# Patient Record
Sex: MALE | Race: OTHER | HISPANIC OR LATINO
[De-identification: names, ages, dates, MRNs, and addresses within clinical notes are randomized per-mention and may not be internally consistent; named-entity substitution may affect disease eponyms.]

---

## 2018-03-27 ENCOUNTER — TRANSCRIPTION ENCOUNTER (OUTPATIENT)
Age: 43
End: 2018-03-27

## 2018-03-28 ENCOUNTER — INPATIENT (INPATIENT)
Facility: HOSPITAL | Age: 43
LOS: 5 days | Discharge: HOME | DRG: 493 | End: 2018-04-03
Attending: ORTHOPAEDIC SURGERY | Admitting: ORTHOPAEDIC SURGERY
Payer: MEDICAID

## 2018-03-28 VITALS
RESPIRATION RATE: 16 BRPM | HEART RATE: 118 BPM | OXYGEN SATURATION: 96 % | DIASTOLIC BLOOD PRESSURE: 79 MMHG | TEMPERATURE: 98 F | SYSTOLIC BLOOD PRESSURE: 113 MMHG

## 2018-03-28 DIAGNOSIS — S82.201B UNSPECIFIED FRACTURE OF SHAFT OF RIGHT TIBIA, INITIAL ENCOUNTER FOR OPEN FRACTURE TYPE I OR II: ICD-10-CM

## 2018-03-28 LAB
ALBUMIN SERPL ELPH-MCNC: 3.8 G/DL — SIGNIFICANT CHANGE UP (ref 3.3–5)
ALP SERPL-CCNC: 83 U/L — SIGNIFICANT CHANGE UP (ref 40–120)
ALT FLD-CCNC: 35 U/L RC — SIGNIFICANT CHANGE UP (ref 10–45)
ANION GAP SERPL CALC-SCNC: 11 MMOL/L — SIGNIFICANT CHANGE UP (ref 5–17)
ANION GAP SERPL CALC-SCNC: 14 MMOL/L — SIGNIFICANT CHANGE UP (ref 5–17)
APPEARANCE UR: CLEAR — SIGNIFICANT CHANGE UP
APTT BLD: 28.1 SEC — SIGNIFICANT CHANGE UP (ref 27.5–37.4)
AST SERPL-CCNC: 29 U/L — SIGNIFICANT CHANGE UP (ref 10–40)
BASOPHILS # BLD AUTO: 0 K/UL — SIGNIFICANT CHANGE UP (ref 0–0.2)
BASOPHILS # BLD AUTO: 0.1 K/UL — SIGNIFICANT CHANGE UP (ref 0–0.2)
BASOPHILS NFR BLD AUTO: 0.1 % — SIGNIFICANT CHANGE UP (ref 0–2)
BASOPHILS NFR BLD AUTO: 0.6 % — SIGNIFICANT CHANGE UP (ref 0–2)
BILIRUB SERPL-MCNC: 0.4 MG/DL — SIGNIFICANT CHANGE UP (ref 0.2–1.2)
BILIRUB UR-MCNC: NEGATIVE — SIGNIFICANT CHANGE UP
BLD GP AB SCN SERPL QL: NEGATIVE — SIGNIFICANT CHANGE UP
BUN SERPL-MCNC: 17 MG/DL — SIGNIFICANT CHANGE UP (ref 7–23)
BUN SERPL-MCNC: 20 MG/DL — SIGNIFICANT CHANGE UP (ref 7–23)
CALCIUM SERPL-MCNC: 7.9 MG/DL — LOW (ref 8.4–10.5)
CALCIUM SERPL-MCNC: 9 MG/DL — SIGNIFICANT CHANGE UP (ref 8.4–10.5)
CHLORIDE SERPL-SCNC: 101 MMOL/L — SIGNIFICANT CHANGE UP (ref 96–108)
CHLORIDE SERPL-SCNC: 102 MMOL/L — SIGNIFICANT CHANGE UP (ref 96–108)
CK MB BLD-MCNC: 1.9 % — SIGNIFICANT CHANGE UP (ref 0–3.5)
CK MB CFR SERPL CALC: 17.2 NG/ML — HIGH (ref 0–6.7)
CK SERPL-CCNC: 907 U/L — HIGH (ref 30–200)
CO2 SERPL-SCNC: 26 MMOL/L — SIGNIFICANT CHANGE UP (ref 22–31)
CO2 SERPL-SCNC: 27 MMOL/L — SIGNIFICANT CHANGE UP (ref 22–31)
COLOR SPEC: YELLOW — SIGNIFICANT CHANGE UP
CREAT SERPL-MCNC: 0.64 MG/DL — SIGNIFICANT CHANGE UP (ref 0.5–1.3)
CREAT SERPL-MCNC: 0.69 MG/DL — SIGNIFICANT CHANGE UP (ref 0.5–1.3)
DIFF PNL FLD: ABNORMAL
EOSINOPHIL # BLD AUTO: 0 K/UL — SIGNIFICANT CHANGE UP (ref 0–0.5)
EOSINOPHIL # BLD AUTO: 0.2 K/UL — SIGNIFICANT CHANGE UP (ref 0–0.5)
EOSINOPHIL NFR BLD AUTO: 0.2 % — SIGNIFICANT CHANGE UP (ref 0–6)
EOSINOPHIL NFR BLD AUTO: 1.9 % — SIGNIFICANT CHANGE UP (ref 0–6)
GAS PNL BLDV: SIGNIFICANT CHANGE UP
GLUCOSE BLDC GLUCOMTR-MCNC: 180 MG/DL — HIGH (ref 70–99)
GLUCOSE BLDC GLUCOMTR-MCNC: 239 MG/DL — HIGH (ref 70–99)
GLUCOSE SERPL-MCNC: 199 MG/DL — HIGH (ref 70–99)
GLUCOSE SERPL-MCNC: 71 MG/DL — SIGNIFICANT CHANGE UP (ref 70–99)
GLUCOSE UR QL: NEGATIVE — SIGNIFICANT CHANGE UP
HCT VFR BLD CALC: 35.8 % — LOW (ref 39–50)
HCT VFR BLD CALC: 41.4 % — SIGNIFICANT CHANGE UP (ref 39–50)
HGB BLD-MCNC: 11.9 G/DL — LOW (ref 13–17)
HGB BLD-MCNC: 13.7 G/DL — SIGNIFICANT CHANGE UP (ref 13–17)
INR BLD: 1.03 RATIO — SIGNIFICANT CHANGE UP (ref 0.88–1.16)
KETONES UR-MCNC: NEGATIVE — SIGNIFICANT CHANGE UP
LEUKOCYTE ESTERASE UR-ACNC: NEGATIVE — SIGNIFICANT CHANGE UP
LYMPHOCYTES # BLD AUTO: 1 K/UL — SIGNIFICANT CHANGE UP (ref 1–3.3)
LYMPHOCYTES # BLD AUTO: 3.5 K/UL — HIGH (ref 1–3.3)
LYMPHOCYTES # BLD AUTO: 34.3 % — SIGNIFICANT CHANGE UP (ref 13–44)
LYMPHOCYTES # BLD AUTO: 7.5 % — LOW (ref 13–44)
MAGNESIUM SERPL-MCNC: 1.9 MG/DL — SIGNIFICANT CHANGE UP (ref 1.6–2.6)
MCHC RBC-ENTMCNC: 30.7 PG — SIGNIFICANT CHANGE UP (ref 27–34)
MCHC RBC-ENTMCNC: 31 PG — SIGNIFICANT CHANGE UP (ref 27–34)
MCHC RBC-ENTMCNC: 33.1 GM/DL — SIGNIFICANT CHANGE UP (ref 32–36)
MCHC RBC-ENTMCNC: 33.1 GM/DL — SIGNIFICANT CHANGE UP (ref 32–36)
MCV RBC AUTO: 92.7 FL — SIGNIFICANT CHANGE UP (ref 80–100)
MCV RBC AUTO: 93.5 FL — SIGNIFICANT CHANGE UP (ref 80–100)
MONOCYTES # BLD AUTO: 0.4 K/UL — SIGNIFICANT CHANGE UP (ref 0–0.9)
MONOCYTES # BLD AUTO: 0.8 K/UL — SIGNIFICANT CHANGE UP (ref 0–0.9)
MONOCYTES NFR BLD AUTO: 3 % — SIGNIFICANT CHANGE UP (ref 2–14)
MONOCYTES NFR BLD AUTO: 7.8 % — SIGNIFICANT CHANGE UP (ref 2–14)
NEUTROPHILS # BLD AUTO: 12.3 K/UL — HIGH (ref 1.8–7.4)
NEUTROPHILS # BLD AUTO: 5.6 K/UL — SIGNIFICANT CHANGE UP (ref 1.8–7.4)
NEUTROPHILS NFR BLD AUTO: 55.4 % — SIGNIFICANT CHANGE UP (ref 43–77)
NEUTROPHILS NFR BLD AUTO: 89.3 % — HIGH (ref 43–77)
NITRITE UR-MCNC: NEGATIVE — SIGNIFICANT CHANGE UP
PH UR: 7 — SIGNIFICANT CHANGE UP (ref 5–8)
PHOSPHATE SERPL-MCNC: 1.8 MG/DL — LOW (ref 2.5–4.5)
PLATELET # BLD AUTO: 262 K/UL — SIGNIFICANT CHANGE UP (ref 150–400)
PLATELET # BLD AUTO: 315 K/UL — SIGNIFICANT CHANGE UP (ref 150–400)
POTASSIUM SERPL-MCNC: 3.7 MMOL/L — SIGNIFICANT CHANGE UP (ref 3.5–5.3)
POTASSIUM SERPL-MCNC: 4.2 MMOL/L — SIGNIFICANT CHANGE UP (ref 3.5–5.3)
POTASSIUM SERPL-SCNC: 3.7 MMOL/L — SIGNIFICANT CHANGE UP (ref 3.5–5.3)
POTASSIUM SERPL-SCNC: 4.2 MMOL/L — SIGNIFICANT CHANGE UP (ref 3.5–5.3)
PROT SERPL-MCNC: 7.4 G/DL — SIGNIFICANT CHANGE UP (ref 6–8.3)
PROT UR-MCNC: 30 MG/DL
PROTHROM AB SERPL-ACNC: 11.2 SEC — SIGNIFICANT CHANGE UP (ref 9.8–12.7)
RBC # BLD: 3.83 M/UL — LOW (ref 4.2–5.8)
RBC # BLD: 4.47 M/UL — SIGNIFICANT CHANGE UP (ref 4.2–5.8)
RBC # FLD: 12.2 % — SIGNIFICANT CHANGE UP (ref 10.3–14.5)
RBC # FLD: 12.3 % — SIGNIFICANT CHANGE UP (ref 10.3–14.5)
RH IG SCN BLD-IMP: POSITIVE — SIGNIFICANT CHANGE UP
SODIUM SERPL-SCNC: 139 MMOL/L — SIGNIFICANT CHANGE UP (ref 135–145)
SODIUM SERPL-SCNC: 142 MMOL/L — SIGNIFICANT CHANGE UP (ref 135–145)
SP GR SPEC: 1.02 — SIGNIFICANT CHANGE UP (ref 1.01–1.02)
TROPONIN T SERPL-MCNC: 0.03 NG/ML — SIGNIFICANT CHANGE UP (ref 0–0.06)
UROBILINOGEN FLD QL: 1 MG/DL
WBC # BLD: 10.2 K/UL — SIGNIFICANT CHANGE UP (ref 3.8–10.5)
WBC # BLD: 13.7 K/UL — HIGH (ref 3.8–10.5)
WBC # FLD AUTO: 10.2 K/UL — SIGNIFICANT CHANGE UP (ref 3.8–10.5)
WBC # FLD AUTO: 13.7 K/UL — HIGH (ref 3.8–10.5)

## 2018-03-28 PROCEDURE — 99285 EMERGENCY DEPT VISIT HI MDM: CPT | Mod: 25

## 2018-03-28 PROCEDURE — 73590 X-RAY EXAM OF LOWER LEG: CPT | Mod: 26,77,59,RT

## 2018-03-28 PROCEDURE — 71045 X-RAY EXAM CHEST 1 VIEW: CPT | Mod: 26

## 2018-03-28 PROCEDURE — 73610 X-RAY EXAM OF ANKLE: CPT | Mod: 26,RT

## 2018-03-28 PROCEDURE — 73590 X-RAY EXAM OF LOWER LEG: CPT | Mod: 26,RT

## 2018-03-28 PROCEDURE — 76377 3D RENDER W/INTRP POSTPROCES: CPT | Mod: 26

## 2018-03-28 PROCEDURE — 93010 ELECTROCARDIOGRAM REPORT: CPT

## 2018-03-28 PROCEDURE — 73700 CT LOWER EXTREMITY W/O DYE: CPT | Mod: 26,RT

## 2018-03-28 PROCEDURE — 73560 X-RAY EXAM OF KNEE 1 OR 2: CPT | Mod: 26,RT

## 2018-03-28 PROCEDURE — 73590 X-RAY EXAM OF LOWER LEG: CPT | Mod: 26,77,RT

## 2018-03-28 RX ORDER — MORPHINE SULFATE 50 MG/1
4 CAPSULE, EXTENDED RELEASE ORAL ONCE
Qty: 0 | Refills: 0 | Status: DISCONTINUED | OUTPATIENT
Start: 2018-03-28 | End: 2018-03-28

## 2018-03-28 RX ORDER — DEXTROSE 50 % IN WATER 50 %
25 SYRINGE (ML) INTRAVENOUS ONCE
Qty: 0 | Refills: 0 | Status: DISCONTINUED | OUTPATIENT
Start: 2018-03-28 | End: 2018-03-28

## 2018-03-28 RX ORDER — HYDROMORPHONE HYDROCHLORIDE 2 MG/ML
0.5 INJECTION INTRAMUSCULAR; INTRAVENOUS; SUBCUTANEOUS
Qty: 0 | Refills: 0 | Status: DISCONTINUED | OUTPATIENT
Start: 2018-03-28 | End: 2018-03-28

## 2018-03-28 RX ORDER — SODIUM CHLORIDE 9 MG/ML
1000 INJECTION, SOLUTION INTRAVENOUS
Qty: 0 | Refills: 0 | Status: DISCONTINUED | OUTPATIENT
Start: 2018-03-28 | End: 2018-04-03

## 2018-03-28 RX ORDER — SODIUM CHLORIDE 9 MG/ML
1000 INJECTION, SOLUTION INTRAVENOUS
Qty: 0 | Refills: 0 | Status: DISCONTINUED | OUTPATIENT
Start: 2018-03-28 | End: 2018-03-28

## 2018-03-28 RX ORDER — SIMVASTATIN 20 MG/1
20 TABLET, FILM COATED ORAL AT BEDTIME
Qty: 0 | Refills: 0 | Status: DISCONTINUED | OUTPATIENT
Start: 2018-03-28 | End: 2018-04-03

## 2018-03-28 RX ORDER — TETANUS TOXOID, REDUCED DIPHTHERIA TOXOID AND ACELLULAR PERTUSSIS VACCINE, ADSORBED 5; 2.5; 8; 8; 2.5 [IU]/.5ML; [IU]/.5ML; UG/.5ML; UG/.5ML; UG/.5ML
0.5 SUSPENSION INTRAMUSCULAR ONCE
Qty: 0 | Refills: 0 | Status: COMPLETED | OUTPATIENT
Start: 2018-03-28 | End: 2018-03-28

## 2018-03-28 RX ORDER — INSULIN LISPRO 100/ML
VIAL (ML) SUBCUTANEOUS AT BEDTIME
Qty: 0 | Refills: 0 | Status: DISCONTINUED | OUTPATIENT
Start: 2018-03-28 | End: 2018-03-28

## 2018-03-28 RX ORDER — CEFAZOLIN SODIUM 1 G
1000 VIAL (EA) INJECTION ONCE
Qty: 0 | Refills: 0 | Status: DISCONTINUED | OUTPATIENT
Start: 2018-03-28 | End: 2018-03-28

## 2018-03-28 RX ORDER — ATENOLOL 25 MG/1
50 TABLET ORAL AT BEDTIME
Qty: 0 | Refills: 0 | Status: DISCONTINUED | OUTPATIENT
Start: 2018-03-28 | End: 2018-04-03

## 2018-03-28 RX ORDER — INSULIN LISPRO 100/ML
VIAL (ML) SUBCUTANEOUS AT BEDTIME
Qty: 0 | Refills: 0 | Status: DISCONTINUED | OUTPATIENT
Start: 2018-03-28 | End: 2018-03-30

## 2018-03-28 RX ORDER — SODIUM CHLORIDE 9 MG/ML
1000 INJECTION INTRAMUSCULAR; INTRAVENOUS; SUBCUTANEOUS
Qty: 0 | Refills: 0 | Status: DISCONTINUED | OUTPATIENT
Start: 2018-03-28 | End: 2018-03-28

## 2018-03-28 RX ORDER — OXYCODONE HYDROCHLORIDE 5 MG/1
10 TABLET ORAL EVERY 4 HOURS
Qty: 0 | Refills: 0 | Status: DISCONTINUED | OUTPATIENT
Start: 2018-03-28 | End: 2018-04-03

## 2018-03-28 RX ORDER — INSULIN LISPRO 100/ML
VIAL (ML) SUBCUTANEOUS
Qty: 0 | Refills: 0 | Status: DISCONTINUED | OUTPATIENT
Start: 2018-03-28 | End: 2018-03-30

## 2018-03-28 RX ORDER — MORPHINE SULFATE 50 MG/1
4 CAPSULE, EXTENDED RELEASE ORAL EVERY 4 HOURS
Qty: 0 | Refills: 0 | Status: DISCONTINUED | OUTPATIENT
Start: 2018-03-28 | End: 2018-04-03

## 2018-03-28 RX ORDER — GLUCAGON INJECTION, SOLUTION 0.5 MG/.1ML
1 INJECTION, SOLUTION SUBCUTANEOUS ONCE
Qty: 0 | Refills: 0 | Status: DISCONTINUED | OUTPATIENT
Start: 2018-03-28 | End: 2018-04-03

## 2018-03-28 RX ORDER — INSULIN LISPRO 100/ML
VIAL (ML) SUBCUTANEOUS
Qty: 0 | Refills: 0 | Status: DISCONTINUED | OUTPATIENT
Start: 2018-03-28 | End: 2018-03-28

## 2018-03-28 RX ORDER — DEXTROSE 50 % IN WATER 50 %
50 SYRINGE (ML) INTRAVENOUS ONCE
Qty: 0 | Refills: 0 | Status: COMPLETED | OUTPATIENT
Start: 2018-03-28 | End: 2018-03-28

## 2018-03-28 RX ORDER — LEVOTHYROXINE SODIUM 125 MCG
150 TABLET ORAL DAILY
Qty: 0 | Refills: 0 | Status: DISCONTINUED | OUTPATIENT
Start: 2018-03-28 | End: 2018-04-03

## 2018-03-28 RX ORDER — ONDANSETRON 8 MG/1
4 TABLET, FILM COATED ORAL EVERY 6 HOURS
Qty: 0 | Refills: 0 | Status: DISCONTINUED | OUTPATIENT
Start: 2018-03-28 | End: 2018-04-03

## 2018-03-28 RX ORDER — SODIUM,POTASSIUM PHOSPHATES 278-250MG
1 POWDER IN PACKET (EA) ORAL ONCE
Qty: 0 | Refills: 0 | Status: DISCONTINUED | OUTPATIENT
Start: 2018-03-28 | End: 2018-03-28

## 2018-03-28 RX ORDER — SODIUM CHLORIDE 9 MG/ML
1000 INJECTION, SOLUTION INTRAVENOUS
Qty: 0 | Refills: 0 | Status: DISCONTINUED | OUTPATIENT
Start: 2018-03-28 | End: 2018-03-29

## 2018-03-28 RX ORDER — DEXTROSE 50 % IN WATER 50 %
1 SYRINGE (ML) INTRAVENOUS ONCE
Qty: 0 | Refills: 0 | Status: DISCONTINUED | OUTPATIENT
Start: 2018-03-28 | End: 2018-03-28

## 2018-03-28 RX ORDER — OXYCODONE AND ACETAMINOPHEN 5; 325 MG/1; MG/1
1 TABLET ORAL EVERY 4 HOURS
Qty: 0 | Refills: 0 | Status: DISCONTINUED | OUTPATIENT
Start: 2018-03-28 | End: 2018-03-28

## 2018-03-28 RX ORDER — DOCUSATE SODIUM 100 MG
100 CAPSULE ORAL THREE TIMES A DAY
Qty: 0 | Refills: 0 | Status: DISCONTINUED | OUTPATIENT
Start: 2018-03-28 | End: 2018-04-03

## 2018-03-28 RX ORDER — CEFAZOLIN SODIUM 1 G
2000 VIAL (EA) INJECTION EVERY 8 HOURS
Qty: 0 | Refills: 0 | Status: COMPLETED | OUTPATIENT
Start: 2018-03-28 | End: 2018-03-29

## 2018-03-28 RX ORDER — DEXTROSE 50 % IN WATER 50 %
12.5 SYRINGE (ML) INTRAVENOUS ONCE
Qty: 0 | Refills: 0 | Status: DISCONTINUED | OUTPATIENT
Start: 2018-03-28 | End: 2018-04-03

## 2018-03-28 RX ORDER — OXYCODONE HYDROCHLORIDE 5 MG/1
5 TABLET ORAL EVERY 4 HOURS
Qty: 0 | Refills: 0 | Status: DISCONTINUED | OUTPATIENT
Start: 2018-03-28 | End: 2018-04-03

## 2018-03-28 RX ORDER — CEFAZOLIN SODIUM 1 G
1000 VIAL (EA) INJECTION ONCE
Qty: 0 | Refills: 0 | Status: COMPLETED | OUTPATIENT
Start: 2018-03-28 | End: 2018-03-28

## 2018-03-28 RX ORDER — DEXTROSE 50 % IN WATER 50 %
25 SYRINGE (ML) INTRAVENOUS ONCE
Qty: 0 | Refills: 0 | Status: DISCONTINUED | OUTPATIENT
Start: 2018-03-28 | End: 2018-04-03

## 2018-03-28 RX ORDER — ONDANSETRON 8 MG/1
4 TABLET, FILM COATED ORAL ONCE
Qty: 0 | Refills: 0 | Status: DISCONTINUED | OUTPATIENT
Start: 2018-03-28 | End: 2018-04-03

## 2018-03-28 RX ORDER — OXYCODONE AND ACETAMINOPHEN 5; 325 MG/1; MG/1
2 TABLET ORAL EVERY 6 HOURS
Qty: 0 | Refills: 0 | Status: DISCONTINUED | OUTPATIENT
Start: 2018-03-28 | End: 2018-03-28

## 2018-03-28 RX ORDER — DEXTROSE 50 % IN WATER 50 %
1 SYRINGE (ML) INTRAVENOUS ONCE
Qty: 0 | Refills: 0 | Status: DISCONTINUED | OUTPATIENT
Start: 2018-03-28 | End: 2018-04-03

## 2018-03-28 RX ORDER — FERROUS SULFATE 325(65) MG
325 TABLET ORAL
Qty: 0 | Refills: 0 | Status: DISCONTINUED | OUTPATIENT
Start: 2018-03-28 | End: 2018-04-03

## 2018-03-28 RX ORDER — GLUCAGON INJECTION, SOLUTION 0.5 MG/.1ML
1 INJECTION, SOLUTION SUBCUTANEOUS ONCE
Qty: 0 | Refills: 0 | Status: DISCONTINUED | OUTPATIENT
Start: 2018-03-28 | End: 2018-03-28

## 2018-03-28 RX ORDER — ASPIRIN/CALCIUM CARB/MAGNESIUM 324 MG
325 TABLET ORAL
Qty: 0 | Refills: 0 | Status: DISCONTINUED | OUTPATIENT
Start: 2018-03-29 | End: 2018-04-03

## 2018-03-28 RX ORDER — ACETAMINOPHEN 500 MG
650 TABLET ORAL EVERY 6 HOURS
Qty: 0 | Refills: 0 | Status: DISCONTINUED | OUTPATIENT
Start: 2018-03-28 | End: 2018-04-03

## 2018-03-28 RX ORDER — DEXTROSE 50 % IN WATER 50 %
12.5 SYRINGE (ML) INTRAVENOUS ONCE
Qty: 0 | Refills: 0 | Status: DISCONTINUED | OUTPATIENT
Start: 2018-03-28 | End: 2018-03-28

## 2018-03-28 RX ADMIN — TETANUS TOXOID, REDUCED DIPHTHERIA TOXOID AND ACELLULAR PERTUSSIS VACCINE, ADSORBED 0.5 MILLILITER(S): 5; 2.5; 8; 8; 2.5 SUSPENSION INTRAMUSCULAR at 11:41

## 2018-03-28 RX ADMIN — MORPHINE SULFATE 4 MILLIGRAM(S): 50 CAPSULE, EXTENDED RELEASE ORAL at 11:09

## 2018-03-28 RX ADMIN — SODIUM CHLORIDE 100 MILLILITER(S): 9 INJECTION, SOLUTION INTRAVENOUS at 11:40

## 2018-03-28 RX ADMIN — SIMVASTATIN 20 MILLIGRAM(S): 20 TABLET, FILM COATED ORAL at 22:55

## 2018-03-28 RX ADMIN — Medication 100 MILLIGRAM(S): at 22:55

## 2018-03-28 RX ADMIN — ATENOLOL 50 MILLIGRAM(S): 25 TABLET ORAL at 22:56

## 2018-03-28 RX ADMIN — Medication 50 MILLILITER(S): at 11:05

## 2018-03-28 RX ADMIN — Medication 100 MILLIGRAM(S): at 11:12

## 2018-03-28 NOTE — CONSULT NOTE ADULT - SUBJECTIVE AND OBJECTIVE BOX
Level __ Trauma Activation    CC: Patient is a 42y old  Male who presents with a chief complaint of       Patient is a 42y year old male with PMHx of   HPI: Fall from standing, atraumatic while walking towards store. No head strike, no LOC. Patient did not experience signs of light headedness prior to fall.  Patient not complaining of CP, SOB, abdominal discomfort. Only pain is in RLE, tissue is protruding through small poke hole in distal posterior leg.       Primary Survey  A - airway intact  B - bilateral breath sounds and good chest rise  C - initially BP: 125/82 palpable pulses in all extremities except PT pulse in RLE. Dopller confirmed PT  D - GCS 15 on arrival  Exposure obtained    Secondary survey  Gen: NAD  HEENT: NC/AT  CV: s1, s2, RRR  Pulm: CTA B/L  Chest: No TTP  Abd: Soft, ND, NT, no rebound, no guarding  Groin: Normal appearing  Ext: Palp radial b/l, palp DP b/l, PT confirmed via doppler RLE  Back: no TTP, no palpable runoff, stepoff, or deformity    PMH: Diabetes Mellitus    PSH: None    MEDS: Insulin    Allergies    No Known Allergies    Intolerances      Social: No alcohol use. No smoking history    A/P:                   Imaging Level 2 Trauma Activation    CC: Patient is a 42y old  Male who presents with a chief complaint of RLE lower leg pain secondary to mechanical fall      Patient is a 42y year old male with PMHx of   HPI: Fall from standing, atraumatic while walking towards store. No head strike, no LOC. Patient did not experience signs of light headedness prior to fall.  Patient not complaining of CP, SOB, abdominal discomfort. Only pain is in RLE, tissue is protruding through small poke hole in distal posterior leg. Last ate at 8 am.       Primary Survey  A - airway intact  B - bilateral breath sounds and good chest rise  C - initially BP: 125/82 palpable pulses in all extremities except PT pulse in RLE. Dopller confirmed PT  D - GCS 15 on arrival  Exposure obtained    Secondary survey  Gen: NAD  HEENT: NC/AT  CV: s1, s2, RRR  Pulm: CTA B/L  Chest: No TTP  Abd: Soft, ND, NT, no rebound, no guarding  Groin: Normal appearing  Ext: Palp radial b/l, palp DP b/l, PT confirmed via doppler RLE; open fracture with protruding tissue in posterior tibia (1 cm poke hole)  Back: no TTP, no palpable runoff, stepoff, or deformity    PMH: Diabetes Mellitus    PSH: None    MEDS: Insulin    Allergies    No Known Allergies    Intolerances      Social: No alcohol use. No smoking history    Imaging:  CXR: normal  RLE: Distal tibia and fibula fracture and proximal fibula fracture    A/P: 42M with no significant pmhx presents with open fracture of distal tibia and fibula fracture s/p mechanical fall    - ancef   - tetanus  - NPO/IVF  - pre-op labs for Orthopedics - ORIF today  - no acute general surgical interventions  - Tertiary exam in AM  - discussed with attending Level 2 Trauma Activation    CC: Patient is a 42y old  Male who presents with a chief complaint of RLE lower leg pain secondary to mechanical fall      Patient is a 42y year old male with PMHx of   HPI: Fall from standing, atraumatic while walking towards store. No head strike, no LOC. Patient did not experience signs of light headedness prior to fall.  Patient not complaining of CP, SOB, abdominal discomfort. Only pain is in RLE, tissue is protruding through small poke hole in distal posterior leg. Last ate at 8 am.       Primary Survey  A - airway intact  B - bilateral breath sounds and good chest rise  C - initially BP: 125/82 palpable pulses in all extremities except PT pulse in RLE. Dopller confirmed PT  D - GCS 15 on arrival  Exposure obtained    Secondary survey  Gen: NAD  HEENT: NC/AT  CV: s1, s2, RRR  Pulm: CTA B/L  Chest: No TTP  Abd: Soft, ND, NT, no rebound, no guarding  Groin: Normal appearing  Ext: Palp radial b/l, palp DP b/l, PT confirmed via doppler RLE; open fracture with protruding tissue in posterior tibia (1 cm poke hole)  Back: no TTP, no palpable runoff, stepoff, or deformity    PMH: Diabetes Mellitus    PSH: None    MEDS: Insulin    Allergies    No Known Allergies    Intolerances      Social: No alcohol use. No smoking history    Imaging:  CXR: normal  RLE: Distal tibia and fibula fracture and proximal fibula fracture    A/P: 42M with no significant pmhx presents with open fracture of distal tibia and fibula fracture s/p mechanical fall    - ancef   - tetanus  - NPO/IVF  - pre-op labs for Orthopedics - ORIF today  - no acute general surgical interventions  - Tertiary exam in AM  - No contraindication to surgery from trauma surgery perspective  - discussed with attending

## 2018-03-28 NOTE — BRIEF OPERATIVE NOTE - PROCEDURE
<<-----Click on this checkbox to enter Procedure Intramedullary nailing for fracture of right tibia  03/28/2018  I&D and IMN of Right Tibia Fracture  Active  [id number]

## 2018-03-28 NOTE — BRIEF OPERATIVE NOTE - POST-OP DX
Type I or II open displaced oblique fracture of shaft of right tibia, initial encounter  03/28/2018    Active  Tyler Bill

## 2018-03-28 NOTE — H&P ADULT - ASSESSMENT
A/P: 42y Male with R tibia and fibula fracture  - tetanus if not up to date, ancef abx for open fx protocol in ED  - Admit to ORtho  -pain control  -keep splint dry intact  -rest ice elevate affected leg  -NWB on affected leg  -Post reduction XR reveal adequate reduction  - Patient for OR today with Dr Mascorro, ORIF  - NPO, IF fluids while NPO  - FU Trauma clearance for OR  - Hold all chemical DVT ppx.

## 2018-03-28 NOTE — ED ADULT NURSE REASSESSMENT NOTE - NS ED NURSE REASSESS COMMENT FT1
Patient had his fracture reduced and splint was put on by MD. Patient tolerated procedure well. He was taken to CT scan.
pharmacy was called for patients potassium rx
OR transport came to take patient. ortho MD did not notify ED attending or resident that the patient was to go to the OR. OR was called and report was given. Valuables were given by patient to his family. Patient was made aware to removed all clothing underneath gown. Receiving RN herlinda aware that patient has not yet received potassium, and also made aware that patient was hypoglycemic earlier. Patient taken to OR.

## 2018-03-28 NOTE — PATIENT PROFILE ADULT. - VISION (WITH CORRECTIVE LENSES IF THE PATIENT USUALLY WEARS THEM):
reading/driving/Partially impaired: cannot see medication labels or newsprint, but can see obstacles in path, and the surrounding layout; can count fingers at arm's length

## 2018-03-28 NOTE — ED PROVIDER NOTE - ATTENDING CONTRIBUTION TO CARE
42 yom pmhx insulin dependent DM presents after becoming weak and lightheaded while walking - twisted his right leg/ ankle. ems found him to be hypoglycemic to 50s in field, gave amp of d50. states no change to his usual insulin regimen, did eat breakfast. pt did not hit head, no LOC. has mild pain to right ankle, though also has an obvious open fx to right distal tib/fib. no cp or sob. no recent illnessess. 42 yom pmhx insulin dependent DM presents after becoming weak and lightheaded while walking - twisted his right leg/ ankle. ems found him to be hypoglycemic to 50s in field, gave amp of d50. states no change to his usual insulin regimen, did eat breakfast. pt did not hit head, no LOC. has mild pain to right ankle, though also has an obvious open fx to right distal tib/fib. no cp or sob. no recent illnessess.    ROS:   constitutional - no fever, no chills  eyes - no visual changes, no redness  eent - no sore throat, no nasal congestion  cvs - no chest pain, no leg swelling  resp - no shortness of breath, no cough  gi - no abdominal pain, no vomiting, no diarrhea  gu - no dysuria, no hematuria  msk - no acute back pain, no joint swelling, + right ankle pain  skin - no rashes, no jaundice  neuro - no headache, no focal weakness  psych - no acute mental health issue     Physical Exam:   constitutional - well appearing, awake and alert, oriented x3, gcs 15  head - no external evidence of trauma  cvs - rrr, no murmurs, no peripheral edema  resp - breath sounds clear and equal bilat  gi - abdomen soft and nontender, no rigidity, guarding or rebound, bowel sounds present  msk - moving all extremities spontaneously w exception of open fx to tib/fib distal rle, distal dp pulses are intact.   neuro - alert and oriented x3, no focal deficits, CNs 2-12 grossly intact  skin- no jaundice, warm and dry  psych - mood and affect wnl, no apparent risk to self or others     likely twisted ankle due to hypoglycemia - open tib/fib fx - will admit to ortho for further eval likely OR. JADYN Velazquez MD

## 2018-03-28 NOTE — H&P ADULT - NSHPPHYSICALEXAM_GEN_ALL_CORE
Vital Signs Last 24 Hrs  T(C): 36.6 (28 Mar 2018 11:42), Max: 36.6 (28 Mar 2018 11:42)  T(F): 97.9 (28 Mar 2018 11:42), Max: 97.9 (28 Mar 2018 11:42)  HR: 118 (28 Mar 2018 11:42) (118 - 118)  BP: 113/79 (28 Mar 2018 11:42) (113/79 - 113/79)  BP(mean): --  RR: 16 (28 Mar 2018 11:42) (16 - 16)  SpO2: 96% (28 Mar 2018 11:42) (96% - 96%)    Physical Exam  Gen: NAD, AAOx3  RLE: 1cm laceration in skin along posterior distal tibia, no gross bone or debridement visualized, +swelling at fracture site, +TTP over fracture site, no bony TTP at Knee/Foot/Toes, able to SLR, neg logroll, +EHL/FHL/TA/GS, SILT L3-S1, +DP/PT Pulses, compartments soft.  Knee exam: non tender to palpation along joint line, no gross edema or ecchymosis, skin intact  ankle exam: ankle ROM limited 2/2 pain,  edema or erythema extending from distal tibial injury, skin intact, non tender to palpation of medial and lateral malleoulus    Secondary Survey: Full ROM of unaffected extremities, SILT globally, compartments soft, no bony TTP over bony prominences, no calf TTP, able to SLR with contralateral leg, no TTP along axial spine    Procedure:  Reduction performed. Pt placed in long leg splint at 30degrees of knee flexion. NVI post splint.

## 2018-03-28 NOTE — H&P ADULT - HISTORY OF PRESENT ILLNESS
42y Male community ambulator presents c/o R lower extremity pain s/p fall. Pt is a community ambulatory at baseline. Pt is unable to bear weight on extremity. Pt denies numbness tingling paresthesias in affected limb. Pt denies headstrike or LOC and denies any other orthopedic injuries at this time.

## 2018-03-29 DIAGNOSIS — E03.9 HYPOTHYROIDISM, UNSPECIFIED: ICD-10-CM

## 2018-03-29 DIAGNOSIS — S82.201B UNSPECIFIED FRACTURE OF SHAFT OF RIGHT TIBIA, INITIAL ENCOUNTER FOR OPEN FRACTURE TYPE I OR II: ICD-10-CM

## 2018-03-29 DIAGNOSIS — E10.65 TYPE 1 DIABETES MELLITUS WITH HYPERGLYCEMIA: ICD-10-CM

## 2018-03-29 LAB
ANION GAP SERPL CALC-SCNC: 12 MMOL/L — SIGNIFICANT CHANGE UP (ref 5–17)
ANION GAP SERPL CALC-SCNC: 13 MMOL/L — SIGNIFICANT CHANGE UP (ref 5–17)
B-OH-BUTYR SERPL-SCNC: 0 MMOL/L — SIGNIFICANT CHANGE UP
BUN SERPL-MCNC: 18 MG/DL — SIGNIFICANT CHANGE UP (ref 7–23)
BUN SERPL-MCNC: 23 MG/DL — SIGNIFICANT CHANGE UP (ref 7–23)
CALCIUM SERPL-MCNC: 8.3 MG/DL — LOW (ref 8.4–10.5)
CALCIUM SERPL-MCNC: 8.5 MG/DL — SIGNIFICANT CHANGE UP (ref 8.4–10.5)
CHLORIDE SERPL-SCNC: 97 MMOL/L — SIGNIFICANT CHANGE UP (ref 96–108)
CHLORIDE SERPL-SCNC: 98 MMOL/L — SIGNIFICANT CHANGE UP (ref 96–108)
CO2 SERPL-SCNC: 26 MMOL/L — SIGNIFICANT CHANGE UP (ref 22–31)
CO2 SERPL-SCNC: 27 MMOL/L — SIGNIFICANT CHANGE UP (ref 22–31)
CREAT SERPL-MCNC: 0.81 MG/DL — SIGNIFICANT CHANGE UP (ref 0.5–1.3)
CREAT SERPL-MCNC: 1.05 MG/DL — SIGNIFICANT CHANGE UP (ref 0.5–1.3)
GLUCOSE BLDC GLUCOMTR-MCNC: 154 MG/DL — HIGH (ref 70–99)
GLUCOSE BLDC GLUCOMTR-MCNC: 318 MG/DL — HIGH (ref 70–99)
GLUCOSE BLDC GLUCOMTR-MCNC: 368 MG/DL — HIGH (ref 70–99)
GLUCOSE BLDC GLUCOMTR-MCNC: 388 MG/DL — HIGH (ref 70–99)
GLUCOSE BLDC GLUCOMTR-MCNC: 440 MG/DL — HIGH (ref 70–99)
GLUCOSE BLDC GLUCOMTR-MCNC: 459 MG/DL — CRITICAL HIGH (ref 70–99)
GLUCOSE BLDC GLUCOMTR-MCNC: 484 MG/DL — CRITICAL HIGH (ref 70–99)
GLUCOSE BLDC GLUCOMTR-MCNC: 515 MG/DL — CRITICAL HIGH (ref 70–99)
GLUCOSE BLDC GLUCOMTR-MCNC: >600 MG/DL — CRITICAL HIGH (ref 70–99)
GLUCOSE SERPL-MCNC: 371 MG/DL — HIGH (ref 70–99)
GLUCOSE SERPL-MCNC: 383 MG/DL — HIGH (ref 70–99)
HBA1C BLD-MCNC: 8 % — HIGH (ref 4–5.6)
HCT VFR BLD CALC: 32.7 % — LOW (ref 39–50)
HGB BLD-MCNC: 10.8 G/DL — LOW (ref 13–17)
MCHC RBC-ENTMCNC: 30.1 PG — SIGNIFICANT CHANGE UP (ref 27–34)
MCHC RBC-ENTMCNC: 33 GM/DL — SIGNIFICANT CHANGE UP (ref 32–36)
MCV RBC AUTO: 91.1 FL — SIGNIFICANT CHANGE UP (ref 80–100)
NRBC # BLD: 0 /100 WBCS — SIGNIFICANT CHANGE UP (ref 0–0)
PLATELET # BLD AUTO: 255 K/UL — SIGNIFICANT CHANGE UP (ref 150–400)
POTASSIUM SERPL-MCNC: 4.5 MMOL/L — SIGNIFICANT CHANGE UP (ref 3.5–5.3)
POTASSIUM SERPL-MCNC: 4.7 MMOL/L — SIGNIFICANT CHANGE UP (ref 3.5–5.3)
POTASSIUM SERPL-SCNC: 4.5 MMOL/L — SIGNIFICANT CHANGE UP (ref 3.5–5.3)
POTASSIUM SERPL-SCNC: 4.7 MMOL/L — SIGNIFICANT CHANGE UP (ref 3.5–5.3)
RBC # BLD: 3.59 M/UL — LOW (ref 4.2–5.8)
RBC # FLD: 13.9 % — SIGNIFICANT CHANGE UP (ref 10.3–14.5)
SODIUM SERPL-SCNC: 136 MMOL/L — SIGNIFICANT CHANGE UP (ref 135–145)
SODIUM SERPL-SCNC: 137 MMOL/L — SIGNIFICANT CHANGE UP (ref 135–145)
WBC # BLD: 9.02 K/UL — SIGNIFICANT CHANGE UP (ref 3.8–10.5)
WBC # FLD AUTO: 9.02 K/UL — SIGNIFICANT CHANGE UP (ref 3.8–10.5)

## 2018-03-29 PROCEDURE — 99223 1ST HOSP IP/OBS HIGH 75: CPT | Mod: GC

## 2018-03-29 RX ORDER — INSULIN LISPRO 100/ML
4 VIAL (ML) SUBCUTANEOUS ONCE
Qty: 0 | Refills: 0 | Status: COMPLETED | OUTPATIENT
Start: 2018-03-29 | End: 2018-03-29

## 2018-03-29 RX ORDER — INSULIN LISPRO 100/ML
6 VIAL (ML) SUBCUTANEOUS
Qty: 0 | Refills: 0 | Status: DISCONTINUED | OUTPATIENT
Start: 2018-03-29 | End: 2018-03-30

## 2018-03-29 RX ORDER — INSULIN GLARGINE 100 [IU]/ML
15 INJECTION, SOLUTION SUBCUTANEOUS ONCE
Qty: 0 | Refills: 0 | Status: COMPLETED | OUTPATIENT
Start: 2018-03-29 | End: 2018-03-29

## 2018-03-29 RX ORDER — INSULIN GLARGINE 100 [IU]/ML
15 INJECTION, SOLUTION SUBCUTANEOUS
Qty: 0 | Refills: 0 | Status: DISCONTINUED | OUTPATIENT
Start: 2018-03-30 | End: 2018-03-30

## 2018-03-29 RX ADMIN — MORPHINE SULFATE 4 MILLIGRAM(S): 50 CAPSULE, EXTENDED RELEASE ORAL at 13:04

## 2018-03-29 RX ADMIN — ATENOLOL 50 MILLIGRAM(S): 25 TABLET ORAL at 21:41

## 2018-03-29 RX ADMIN — OXYCODONE HYDROCHLORIDE 10 MILLIGRAM(S): 5 TABLET ORAL at 06:15

## 2018-03-29 RX ADMIN — Medication 100 MILLIGRAM(S): at 06:15

## 2018-03-29 RX ADMIN — Medication 325 MILLIGRAM(S): at 12:05

## 2018-03-29 RX ADMIN — Medication 5: at 17:52

## 2018-03-29 RX ADMIN — OXYCODONE HYDROCHLORIDE 10 MILLIGRAM(S): 5 TABLET ORAL at 22:21

## 2018-03-29 RX ADMIN — MORPHINE SULFATE 4 MILLIGRAM(S): 50 CAPSULE, EXTENDED RELEASE ORAL at 13:37

## 2018-03-29 RX ADMIN — MORPHINE SULFATE 4 MILLIGRAM(S): 50 CAPSULE, EXTENDED RELEASE ORAL at 20:34

## 2018-03-29 RX ADMIN — SIMVASTATIN 20 MILLIGRAM(S): 20 TABLET, FILM COATED ORAL at 21:41

## 2018-03-29 RX ADMIN — MORPHINE SULFATE 4 MILLIGRAM(S): 50 CAPSULE, EXTENDED RELEASE ORAL at 20:04

## 2018-03-29 RX ADMIN — OXYCODONE HYDROCHLORIDE 10 MILLIGRAM(S): 5 TABLET ORAL at 01:00

## 2018-03-29 RX ADMIN — Medication 325 MILLIGRAM(S): at 17:53

## 2018-03-29 RX ADMIN — OXYCODONE HYDROCHLORIDE 5 MILLIGRAM(S): 5 TABLET ORAL at 10:07

## 2018-03-29 RX ADMIN — Medication 100 MILLIGRAM(S): at 21:41

## 2018-03-29 RX ADMIN — INSULIN GLARGINE 15 UNIT(S): 100 INJECTION, SOLUTION SUBCUTANEOUS at 14:11

## 2018-03-29 RX ADMIN — Medication 5: at 09:21

## 2018-03-29 RX ADMIN — OXYCODONE HYDROCHLORIDE 10 MILLIGRAM(S): 5 TABLET ORAL at 01:30

## 2018-03-29 RX ADMIN — Medication 325 MILLIGRAM(S): at 06:15

## 2018-03-29 RX ADMIN — Medication 325 MILLIGRAM(S): at 08:42

## 2018-03-29 RX ADMIN — OXYCODONE HYDROCHLORIDE 10 MILLIGRAM(S): 5 TABLET ORAL at 23:00

## 2018-03-29 RX ADMIN — Medication 1 TABLET(S): at 12:08

## 2018-03-29 RX ADMIN — OXYCODONE HYDROCHLORIDE 5 MILLIGRAM(S): 5 TABLET ORAL at 09:37

## 2018-03-29 RX ADMIN — Medication 150 MICROGRAM(S): at 06:14

## 2018-03-29 RX ADMIN — Medication 6 UNIT(S): at 17:52

## 2018-03-29 RX ADMIN — Medication 6: at 12:05

## 2018-03-29 RX ADMIN — Medication 2: at 21:41

## 2018-03-29 RX ADMIN — Medication 4 UNIT(S): at 12:05

## 2018-03-29 NOTE — PROGRESS NOTE ADULT - SUBJECTIVE AND OBJECTIVE BOX
s/p IM nailing tibia/I&D    PLAN: Nonweightbearing, discharge on BID aspirin, move knee/ankle. F/U 10-14 days

## 2018-03-29 NOTE — CONSULT NOTE ADULT - SUBJECTIVE AND OBJECTIVE BOX
HPI:  41 y/o M PMH DM1 with no known complications presents with right tibial fracture after non-traumatic fall, now s/p ORIF.    Endocrine History:  Patient states that he was diagnosed with DM1 at age 8. Has been on insulin since then. He lives in [place] with his parents and sees an endocrinologist at [facility]. He takes anywhere from 3-10 units of Humalog before meals. Checks FS 4 times a day - AM fasting usually in 80's. Pre-lunch FS range from 100-115, and pre-dinner FS are as high as the 200's. Bedtime FS range from 90's to 200's. HbA1c here is 8.0. He states that if his bedtime Fs is in the 200's he will take more lantus, as much as Lantus 35 units (but most of the time only takes 15 units). Saw optho in October 2017 and does not have retinopathy. Denies history of retinopathy and neuropathy. Denies prior history of DKA.     He also has a history of hypothyroidism and has been on levothyroxine for about 2 years. On LT4 150 mcg qd. Denies neck pain, dysphagia, dysphonia. No chest pain or palpitations. No abdominal pain, nausea, vomiting, diarrhea or constipation. No skin or hair changes. Weight has been stable. No heat or cold intolerance.    Regarding fracture history, patient states he was walking when his legs became weak and he fell. Has never had a DEXA in the past.     Patient has not received basal insulin while hospitalized with resulting hyperglycemia to the 500's. Tolerating po with no nausea, vomiting, abdominal pain. Does not have blurry vision, polyuria, polydipsia.       PAST MEDICAL & SURGICAL HISTORY:  Diabetes Mellitus 1  s/p ORIF of right tibia      FAMILY HISTORY:  DM2 in mother    Social History:  No cigarette or alcohol use  Works part time a   Lives with parents    Outpatient Medications:  Levothyroxine 150 mcg qd  Lantus 15 units qhs  Humalog 3-10 units before meals    MEDICATIONS  (STANDING):  aspirin enteric coated 325 milliGRAM(s) Oral two times a day  ATENolol  Tablet 50 milliGRAM(s) Oral at bedtime  dextrose 5%. 1000 milliLiter(s) (50 mL/Hr) IV Continuous <Continuous>  dextrose 50% Injectable 12.5 Gram(s) IV Push once  dextrose 50% Injectable 25 Gram(s) IV Push once  dextrose 50% Injectable 25 Gram(s) IV Push once  ferrous    sulfate 325 milliGRAM(s) Oral three times a day with meals  insulin lispro (HumaLOG) corrective regimen sliding scale   SubCutaneous three times a day before meals  insulin lispro (HumaLOG) corrective regimen sliding scale   SubCutaneous at bedtime  levothyroxine 150 MICROGram(s) Oral daily  multivitamin 1 Tablet(s) Oral daily  simvastatin 20 milliGRAM(s) Oral at bedtime    MEDICATIONS  (PRN):  acetaminophen   Tablet 650 milliGRAM(s) Oral every 6 hours PRN For Temp greater than 38 C (100.4 F)  aluminum hydroxide/magnesium hydroxide/simethicone Suspension 30 milliLiter(s) Oral four times a day PRN Indigestion  dextrose Gel 1 Dose(s) Oral once PRN Blood Glucose LESS THAN 70 milliGRAM(s)/deciliter  docusate sodium 100 milliGRAM(s) Oral three times a day PRN Constipation  glucagon  Injectable 1 milliGRAM(s) IntraMuscular once PRN Glucose LESS THAN 70 milligrams/deciliter  morphine  - Injectable 4 milliGRAM(s) IV Push every 4 hours PRN Severe Pain  ondansetron Injectable 4 milliGRAM(s) IV Push once PRN Nausea and/or Vomiting  ondansetron Injectable 4 milliGRAM(s) IV Push every 6 hours PRN Nausea and/or Vomiting  oxyCODONE    IR 10 milliGRAM(s) Oral every 4 hours PRN Moderate Pain  oxyCODONE    IR 5 milliGRAM(s) Oral every 4 hours PRN Mild Pain      Allergies  No Known Allergies      Review of Systems:  Constitutional: No fever  Eyes: No blurry vision  HEENT: No pain  Cardiovascular: No chest pain, palpitations  Respiratory: No SOB, no cough  GI: No nausea, vomiting, abdominal pain  : No dysuria  Skin: no rash  Endocrine: no polyuria, polydipsia  Osteoporosis: + fracture    ALL OTHER SYSTEMS REVIEWED AND NEGATIVE    PHYSICAL EXAM:  VITALS: T(C): 37.4 (03-29-18 @ 13:52)  T(F): 99.3 (03-29-18 @ 13:52), Max: 99.3 (03-29-18 @ 13:52)  HR: 99 (03-29-18 @ 13:52) (83 - 110)  BP: 103/64 (03-29-18 @ 13:52) (98/61 - 140/65)  RR:  (14 - 18)  SpO2:  (94% - 98%)  Wt(kg): --  GENERAL: NAD, well-developed  EYES: No proptosis, anicteric  HEENT:  Atraumatic, Normocephalic  THYROID: Normal size, no palpable nodules  RESPIRATORY: Clear to auscultation bilaterally; No rales, rhonchi, wheezing  CARDIOVASCULAR: Regular rate and rhythm; No murmurs; no peripheral edema noted on LLE  GI: Soft, nontender, non distended, normal bowel sounds  SKIN: Dry, intact, No rashes or lesions  MUSCULOSKELETAL: right leg wrapped, no lesions noted on left foot  PSYCH: Alert and oriented x 3, reactive affect      POCT Blood Glucose.: 440 mg/dL (03-29-18 @ 13:46) L 15  POCT Blood Glucose.: 515 mg/dL (03-29-18 @ 13:43)  POCT Blood Glucose.: 484 mg/dL (03-29-18 @ 11:57)  H 6, 4  POCT Blood Glucose.: 459 mg/dL (03-29-18 @ 11:55)  POCT Blood Glucose.: 368 mg/dL (03-29-18 @ 08:36) H 5  POCT Blood Glucose.: 239 mg/dL (03-28-18 @ 21:58)  POCT Blood Glucose.: 180 mg/dL (03-28-18 @ 18:01)  POCT Blood Glucose.: 154 mg/dL (03-28-18 @ 15:28)  POCT Blood Glucose.: 153 mg/dL (03-28-18 @ 13:12)  POCT Blood Glucose.: 75 mg/dL (03-28-18 @ 10:59)                          10.8   9.02  )-----------( 255      ( 29 Mar 2018 07:41 )             32.7       03-29    137  |  97  |  23  ----------------------------<  383<H>  4.5   |  27  |  1.05    EGFR if : 102  EGFR if non : 88    Ca    8.5      03-29  Mg     1.9     03-28  Phos  1.8     03-28    TPro  7.4  /  Alb  3.8  /  TBili  0.4  /  DBili  x   /  AST  29  /  ALT  35  /  AlkPhos  83  03-28      Hemoglobin A1C, Whole Blood: 8.0 % <H> [4.0 - 5.6] (03-29-18 @ 07:41)

## 2018-03-29 NOTE — PHYSICAL THERAPY INITIAL EVALUATION ADULT - PERTINENT HX OF CURRENT PROBLEM, REHAB EVAL
41 y/o male presents with c/o R lower extremity pain s/p fall. Pt with resultant R tibia and fibula fracture. Pt now presents s/p above procedure.

## 2018-03-29 NOTE — PROGRESS NOTE ADULT - SUBJECTIVE AND OBJECTIVE BOX
Orthopedics Post-op Check    POD 1 R Tibia Fracture ORIF  Pt seen and examined. Pain is controlled. Feeling well overall.  No issues per nursing.     Vital Signs Last 24 Hrs  T(C): 36.8 (03-29-18 @ 03:44), Max: 36.9 (03-28-18 @ 23:30)  T(F): 98.3 (03-29-18 @ 03:44), Max: 98.5 (03-28-18 @ 23:30)  HR: 88 (03-29-18 @ 03:44) (74 - 118)  BP: 98/61 (03-29-18 @ 03:44) (98/61 - 140/65)  BP(mean): 90 (03-28-18 @ 19:45) (90 - 100)  RR: 18 (03-29-18 @ 03:44) (14 - 18)  SpO2: 94% (03-29-18 @ 03:44) (94% - 98%)                        11.9   13.7  )-----------( 262      ( 28 Mar 2018 18:19 )             35.8     28 Mar 2018 18:19    139    |  101    |  17     ----------------------------<  199    4.2     |  27     |  0.64     Ca    7.9        28 Mar 2018 18:19  Phos  1.8       28 Mar 2018 11:28  Mg     1.9       28 Mar 2018 11:28    TPro  7.4    /  Alb  3.8    /  TBili  0.4    /  DBili  x      /  AST  29     /  ALT  35     /  AlkPhos  83     28 Mar 2018 11:28    PT/INR - ( 28 Mar 2018 11:28 )   PT: 11.2 sec;   INR: 1.03 ratio         PTT - ( 28 Mar 2018 11:28 )  PTT:28.1 sec    Exam:  NAD AAOx3    R/L LE: Proximal Dressing clean and dry; Distal Dressing Saturated, draining from closed 'open' wound in posterior calf.   Compartments Soft  No Calf TTP B/L  +DP Pulse 2+/4  SILT L2-S1  +EHL FHL TA GS      A/P:  42y M/F s/p R Tibia IMN  POD #1  -Pain control  - DVT ppx  - NWB RLE/PT/OOB  - Dressing changed  - FU labs  - Med mgmt  - D/C Planning

## 2018-03-29 NOTE — PHYSICAL THERAPY INITIAL EVALUATION ADULT - ADDITIONAL COMMENTS
Pt states he lives with parents in apartment with 3 steps to enter. Pt states he lives with parents in apartment with 3 steps to enter (+handrail) and a flight of steps to bedroom/bathroom (+handrail). Pt states prior to admission being independent with all functional mobility and ADLs.

## 2018-03-29 NOTE — PHYSICAL THERAPY INITIAL EVALUATION ADULT - MANUAL MUSCLE TESTING RESULTS, REHAB EVAL
BUE grossly at least 4/5, LLE grossly at least 3+/5, R LE not formally assessed 2/2 surgical precautions/grossly assessed due to

## 2018-03-29 NOTE — PHYSICAL THERAPY INITIAL EVALUATION ADULT - GAIT TRAINING, PT EVAL
GOAL: Pt will ambulate 150 feet with axillary crutches in a swing-to pattern independently in 2 weeks

## 2018-03-29 NOTE — CONSULT NOTE ADULT - PROBLEM SELECTOR RECOMMENDATION 3
- patient admitted with non-traumatic fracture of right tibia/fibula which should not occur in an otherwise young, healthy adult  - recommend check vitamin D 25 level  - will need DEXA as outpatient - patient admitted with non-traumatic fracture of right tibia/fibula which should not occur in an otherwise young, healthy adult  - recommend check vitamin D 25 level  - will need DXA as outpatient

## 2018-03-29 NOTE — CONSULT NOTE ADULT - ATTENDING COMMENTS
Agree with assessment and plan as above by Dr. Manley. Reviewed all pertinent labs, glucose values, and imaging studies. Modifications made as indicated above.     Adrian Castillo D.O  [phone]

## 2018-03-29 NOTE — PHYSICAL THERAPY INITIAL EVALUATION ADULT - PLANNED THERAPY INTERVENTIONS, PT EVAL
gait training/transfer training/strengthening/balance training/GOAL: Pt will negotiate 9 steps with unilateral handrail and axillary crutch independently in 2 weeks

## 2018-03-29 NOTE — PROVIDER CONTACT NOTE (OTHER) - SITUATION
Previously hyper at lunch @ 484 and medicated w 10 units. pt nowambulating w PT, states feeling dizzy and requests a fingerstick, hyperglycemic % 515 repeat 440.

## 2018-03-29 NOTE — CONSULT NOTE ADULT - ASSESSMENT
43 y/o M with uncontrolled DM1 with no known microvascular complications, hypothyroidism, presents with non-traumatic right tibial fracture 43 y/o M with uncontrolled DM1 with no known microvascular complications, hypothyroidism, presents with non-traumatic right tibial fracture now with glucose values > 400 (high risk patient with high level decision-making).

## 2018-03-30 ENCOUNTER — TRANSCRIPTION ENCOUNTER (OUTPATIENT)
Age: 43
End: 2018-03-30

## 2018-03-30 DIAGNOSIS — R00.0 TACHYCARDIA, UNSPECIFIED: ICD-10-CM

## 2018-03-30 LAB
24R-OH-CALCIDIOL SERPL-MCNC: 28 NG/ML — LOW (ref 30–80)
ANION GAP SERPL CALC-SCNC: 10 MMOL/L — SIGNIFICANT CHANGE UP (ref 5–17)
BUN SERPL-MCNC: 16 MG/DL — SIGNIFICANT CHANGE UP (ref 7–23)
CALCIUM SERPL-MCNC: 8.2 MG/DL — LOW (ref 8.4–10.5)
CHLORIDE SERPL-SCNC: 98 MMOL/L — SIGNIFICANT CHANGE UP (ref 96–108)
CO2 SERPL-SCNC: 27 MMOL/L — SIGNIFICANT CHANGE UP (ref 22–31)
CREAT SERPL-MCNC: 0.7 MG/DL — SIGNIFICANT CHANGE UP (ref 0.5–1.3)
D DIMER BLD IA.RAPID-MCNC: 606 NG/ML DDU — HIGH
GLUCOSE BLDC GLUCOMTR-MCNC: 211 MG/DL — HIGH (ref 70–99)
GLUCOSE BLDC GLUCOMTR-MCNC: 228 MG/DL — HIGH (ref 70–99)
GLUCOSE BLDC GLUCOMTR-MCNC: 231 MG/DL — HIGH (ref 70–99)
GLUCOSE BLDC GLUCOMTR-MCNC: 235 MG/DL — HIGH (ref 70–99)
GLUCOSE BLDC GLUCOMTR-MCNC: 321 MG/DL — HIGH (ref 70–99)
GLUCOSE BLDC GLUCOMTR-MCNC: 346 MG/DL — HIGH (ref 70–99)
GLUCOSE BLDC GLUCOMTR-MCNC: 411 MG/DL — HIGH (ref 70–99)
GLUCOSE BLDC GLUCOMTR-MCNC: 422 MG/DL — HIGH (ref 70–99)
GLUCOSE SERPL-MCNC: 294 MG/DL — HIGH (ref 70–99)
HCT VFR BLD CALC: 30.1 % — LOW (ref 39–50)
HGB BLD-MCNC: 10 G/DL — LOW (ref 13–17)
MCHC RBC-ENTMCNC: 29.9 PG — SIGNIFICANT CHANGE UP (ref 27–34)
MCHC RBC-ENTMCNC: 33.2 GM/DL — SIGNIFICANT CHANGE UP (ref 32–36)
MCV RBC AUTO: 90.1 FL — SIGNIFICANT CHANGE UP (ref 80–100)
NRBC # BLD: 0 /100 WBCS — SIGNIFICANT CHANGE UP (ref 0–0)
PLATELET # BLD AUTO: 238 K/UL — SIGNIFICANT CHANGE UP (ref 150–400)
POTASSIUM SERPL-MCNC: 4.3 MMOL/L — SIGNIFICANT CHANGE UP (ref 3.5–5.3)
POTASSIUM SERPL-SCNC: 4.3 MMOL/L — SIGNIFICANT CHANGE UP (ref 3.5–5.3)
RBC # BLD: 3.34 M/UL — LOW (ref 4.2–5.8)
RBC # FLD: 14 % — SIGNIFICANT CHANGE UP (ref 10.3–14.5)
SODIUM SERPL-SCNC: 135 MMOL/L — SIGNIFICANT CHANGE UP (ref 135–145)
T3 SERPL-MCNC: 83 NG/DL — SIGNIFICANT CHANGE UP (ref 80–200)
T4 AB SER-ACNC: 11.1 UG/DL — SIGNIFICANT CHANGE UP (ref 4.6–12)
TSH SERPL-MCNC: 0.27 UIU/ML — SIGNIFICANT CHANGE UP (ref 0.27–4.2)
WBC # BLD: 9.76 K/UL — SIGNIFICANT CHANGE UP (ref 3.8–10.5)
WBC # FLD AUTO: 9.76 K/UL — SIGNIFICANT CHANGE UP (ref 3.8–10.5)

## 2018-03-30 PROCEDURE — 93010 ELECTROCARDIOGRAM REPORT: CPT

## 2018-03-30 PROCEDURE — 99232 SBSQ HOSP IP/OBS MODERATE 35: CPT | Mod: GC

## 2018-03-30 PROCEDURE — 71275 CT ANGIOGRAPHY CHEST: CPT | Mod: 26

## 2018-03-30 RX ORDER — INSULIN LISPRO 100/ML
VIAL (ML) SUBCUTANEOUS AT BEDTIME
Qty: 0 | Refills: 0 | Status: DISCONTINUED | OUTPATIENT
Start: 2018-03-30 | End: 2018-04-02

## 2018-03-30 RX ORDER — INSULIN GLARGINE 100 [IU]/ML
30 INJECTION, SOLUTION SUBCUTANEOUS
Qty: 0 | Refills: 0 | Status: DISCONTINUED | OUTPATIENT
Start: 2018-03-31 | End: 2018-03-31

## 2018-03-30 RX ORDER — SODIUM CHLORIDE 9 MG/ML
1000 INJECTION INTRAMUSCULAR; INTRAVENOUS; SUBCUTANEOUS
Qty: 0 | Refills: 0 | Status: DISCONTINUED | OUTPATIENT
Start: 2018-03-30 | End: 2018-04-03

## 2018-03-30 RX ORDER — INSULIN LISPRO 100/ML
VIAL (ML) SUBCUTANEOUS
Qty: 0 | Refills: 0 | Status: DISCONTINUED | OUTPATIENT
Start: 2018-03-30 | End: 2018-04-02

## 2018-03-30 RX ORDER — INSULIN LISPRO 100/ML
10 VIAL (ML) SUBCUTANEOUS
Qty: 0 | Refills: 0 | Status: DISCONTINUED | OUTPATIENT
Start: 2018-03-30 | End: 2018-03-31

## 2018-03-30 RX ORDER — INSULIN GLARGINE 100 [IU]/ML
15 INJECTION, SOLUTION SUBCUTANEOUS ONCE
Qty: 0 | Refills: 0 | Status: COMPLETED | OUTPATIENT
Start: 2018-03-30 | End: 2018-03-30

## 2018-03-30 RX ADMIN — Medication 10 UNIT(S): at 18:30

## 2018-03-30 RX ADMIN — Medication 6 UNIT(S): at 09:12

## 2018-03-30 RX ADMIN — Medication 325 MILLIGRAM(S): at 18:32

## 2018-03-30 RX ADMIN — Medication 1 TABLET(S): at 12:31

## 2018-03-30 RX ADMIN — Medication 6: at 12:30

## 2018-03-30 RX ADMIN — ATENOLOL 50 MILLIGRAM(S): 25 TABLET ORAL at 21:42

## 2018-03-30 RX ADMIN — SODIUM CHLORIDE 100 MILLILITER(S): 9 INJECTION INTRAMUSCULAR; INTRAVENOUS; SUBCUTANEOUS at 15:42

## 2018-03-30 RX ADMIN — OXYCODONE HYDROCHLORIDE 10 MILLIGRAM(S): 5 TABLET ORAL at 21:41

## 2018-03-30 RX ADMIN — INSULIN GLARGINE 15 UNIT(S): 100 INJECTION, SOLUTION SUBCUTANEOUS at 14:18

## 2018-03-30 RX ADMIN — Medication 100 MILLIGRAM(S): at 21:42

## 2018-03-30 RX ADMIN — Medication 325 MILLIGRAM(S): at 12:31

## 2018-03-30 RX ADMIN — Medication 325 MILLIGRAM(S): at 05:37

## 2018-03-30 RX ADMIN — OXYCODONE HYDROCHLORIDE 5 MILLIGRAM(S): 5 TABLET ORAL at 14:51

## 2018-03-30 RX ADMIN — SIMVASTATIN 20 MILLIGRAM(S): 20 TABLET, FILM COATED ORAL at 21:42

## 2018-03-30 RX ADMIN — INSULIN GLARGINE 15 UNIT(S): 100 INJECTION, SOLUTION SUBCUTANEOUS at 18:45

## 2018-03-30 RX ADMIN — OXYCODONE HYDROCHLORIDE 5 MILLIGRAM(S): 5 TABLET ORAL at 14:21

## 2018-03-30 RX ADMIN — Medication 4: at 09:11

## 2018-03-30 RX ADMIN — OXYCODONE HYDROCHLORIDE 10 MILLIGRAM(S): 5 TABLET ORAL at 22:15

## 2018-03-30 RX ADMIN — OXYCODONE HYDROCHLORIDE 10 MILLIGRAM(S): 5 TABLET ORAL at 06:08

## 2018-03-30 RX ADMIN — Medication 150 MICROGRAM(S): at 05:37

## 2018-03-30 RX ADMIN — Medication 100 MILLIGRAM(S): at 05:37

## 2018-03-30 RX ADMIN — Medication 325 MILLIGRAM(S): at 09:12

## 2018-03-30 RX ADMIN — Medication 6 UNIT(S): at 12:30

## 2018-03-30 RX ADMIN — OXYCODONE HYDROCHLORIDE 10 MILLIGRAM(S): 5 TABLET ORAL at 05:38

## 2018-03-30 NOTE — DISCHARGE NOTE ADULT - MEDICATION SUMMARY - MEDICATIONS TO CHANGE
I will SWITCH the dose or number of times a day I take the medications listed below when I get home from the hospital:    insulin glargine  -- 31 unit(s) subcutaneous once a day  AS PER ENDOCRINE    HumaLOG 100 units/mL subcutaneous solution  -- As Per ENDOCRINE:  Humalog SCALE WITH MEALS as follows:  Glucose levels: :    6 Units  Glucose Levels: 151-250:  8 Units  Glucose Levels: > 251:      10 Units

## 2018-03-30 NOTE — CONSULT NOTE ADULT - SUBJECTIVE AND OBJECTIVE BOX
Patient is a 42y old  Male who presents with a chief complaint of Right Tib Fib Fracture (30 Mar 2018 12:51)      HPI:  42y Male community ambulator presents c/o R lower extremity pain s/p fall. Pt is a community ambulatory at baseline. Pt is unable to bear weight on extremity. Pt denies numbness tingling paresthesias in affected limb. Pt denies headstrike or LOC and denies any other orthopedic injuries at this time. (28 Mar 2018 12:42)      MEDICATIONS  (STANDING):  aspirin enteric coated 325 milliGRAM(s) Oral two times a day  ATENolol  Tablet 50 milliGRAM(s) Oral at bedtime  dextrose 5%. 1000 milliLiter(s) (50 mL/Hr) IV Continuous <Continuous>  dextrose 50% Injectable 12.5 Gram(s) IV Push once  dextrose 50% Injectable 25 Gram(s) IV Push once  dextrose 50% Injectable 25 Gram(s) IV Push once  ferrous    sulfate 325 milliGRAM(s) Oral three times a day with meals  insulin lispro (HumaLOG) corrective regimen sliding scale   SubCutaneous three times a day before meals  insulin lispro (HumaLOG) corrective regimen sliding scale   SubCutaneous at bedtime  insulin lispro Injectable (HumaLOG) 10 Unit(s) SubCutaneous three times a day with meals  levothyroxine 150 MICROGram(s) Oral daily  multivitamin 1 Tablet(s) Oral daily  simvastatin 20 milliGRAM(s) Oral at bedtime  sodium chloride 0.9%. 1000 milliLiter(s) (100 mL/Hr) IV Continuous <Continuous>    MEDICATIONS  (PRN):  acetaminophen   Tablet 650 milliGRAM(s) Oral every 6 hours PRN For Temp greater than 38 C (100.4 F)  aluminum hydroxide/magnesium hydroxide/simethicone Suspension 30 milliLiter(s) Oral four times a day PRN Indigestion  dextrose Gel 1 Dose(s) Oral once PRN Blood Glucose LESS THAN 70 milliGRAM(s)/deciliter  docusate sodium 100 milliGRAM(s) Oral three times a day PRN Constipation  glucagon  Injectable 1 milliGRAM(s) IntraMuscular once PRN Glucose LESS THAN 70 milligrams/deciliter  morphine  - Injectable 4 milliGRAM(s) IV Push every 4 hours PRN Severe Pain  ondansetron Injectable 4 milliGRAM(s) IV Push once PRN Nausea and/or Vomiting  ondansetron Injectable 4 milliGRAM(s) IV Push every 6 hours PRN Nausea and/or Vomiting  oxyCODONE    IR 10 milliGRAM(s) Oral every 4 hours PRN Moderate Pain  oxyCODONE    IR 5 milliGRAM(s) Oral every 4 hours PRN Mild Pain      Allergies    No Known Allergies    Intolerances      PAST MEDICAL HISTORY:  Diabetes    PAST SURGICAL HISTORY:  No significant past surgical history      Diet:  (   ) Regular   (   ) Low Sodium   (   ) Low Cholesterol   (   ) Diabetic   (   ) Other    FAMILY HISTORY:  No pertinent family history in first degree relatives      SOCIAL HISTORY:  Marital Status:  (   )    (   ) Single    (   )    (   )   Occupation:   Lives with: (   ) alone  (   ) children   (   ) spouse   (   ) parents  (   ) other  Substance Use: (  ) never used  (  ) other:  Tobacco Usage:  (   ) never smoked   (   ) former smoker   (   ) current smoker  (   ) pack years  (   ) last cigarette date  Alcohol Usage:  Advanced Directives: (   ) None    (   ) DNR    (   ) DNI    (   ) Health Care Proxy:     REVIEW OF SYSTEM:  CONSTITUTIONAL: No fever, No change in weight, No fatigue  HEAD: No headache, No dizziness, No recent trauma  EYES: No eye pain, No visual disturbances, No discharge  ENT:  No difficulty hearing, No tinnitus, No vertigo, No sinus pain, No throat pain  NECK: No pain, No stiffness  BREASTS: No pain, No masses, No nipple discharge  RESPIRATORY: No cough, No wheezing, No chills, No hemoptysis, No shortness of breath at rest or exertional shortness of breath  CARDIOVASCULAR: No chest pain, No palpitations, No dizziness, No CHF, No arrhythmia, No cardiomegaly, No leg swelling  GASTROINTESTINAL: No abdominal, No epigastric pain. No nausea, No vomiting, No hematemesis, No diarrhea, No constipation. No melena, No hematochezia. No GERD  GENITOURINARY: No dysuria, No frequency, No hematuria, No incontinence, No nocturia, No hesitancy,  SKIN: No itching, No burning, No rashes, No lesions   LYMPH NODES: No history of enlarged glands  ENDOCRINE: No heat or cold intolerance, No hair loss. No osteoporosis, No thyroid disease  MUSCULOSKELETAL: No joint pain or swelling, No muscle, back, or extremity pain  PSYCHIATRIC: No depression, No anxiety, No mood swings, No difficulty sleeping  HEME/LYMPH: No easy bruising, No anticoagulants, No bleeding disorder, No bleeding gums  ALLERGY AND IMMUNOLOGIC: No hives, No eczema  NEUROLOGICAL: No memory loss, No loss of strength, No numbness, No tremors    VITALS:  Vital Signs Last 24 Hrs  T(C): 37.2 (30 Mar 2018 17:30), Max: 37.4 (30 Mar 2018 00:42)  T(F): 98.9 (30 Mar 2018 17:30), Max: 99.4 (30 Mar 2018 00:42)  HR: 109 (30 Mar 2018 17:30) (106 - 123)  BP: 146/77 (30 Mar 2018 17:30) (109/70 - 146/77)  BP(mean): --  RR: 16 (30 Mar 2018 17:30) (16 - 16)  SpO2: 97% (30 Mar 2018 17:30) (92% - 97%)  I&O's Summary    29 Mar 2018 07:01  -  30 Mar 2018 07:00  --------------------------------------------------------  IN: 1580 mL / OUT: 1500 mL / NET: 80 mL    30 Mar 2018 07:01  -  30 Mar 2018 19:05  --------------------------------------------------------  IN: 920 mL / OUT: 1000 mL / NET: -80 mL        PHYSICAL EXAM:  GENERAL: NAD, well nourished and conversant  HEAD:  Atraumatic  EYES: EOM, PERRLA, conjunctiva pink and sclera white  ENT: No tonsillar erythema, exudates, or enlargement, moist mucous membranes, good dentition, no lesions  NECK: Supple, No JVD, normal thyroid, carotids with normal upstrokes and no bruits  CHEST/LUNG: Clear to auscultation bilaterally, No rales, rhonchi, wheezing, or rubs  HEART: Regular rate and rhythm, No murmurs, rubs, or gallops  ABDOMEN: Soft, nondistended, no masses, guarding, tenderness or rebound, bowel sounds present  EXTREMITIES:  2+ Peripheral Pulses, No clubbing, cyanosis, or edema. No arthritis of shoulders, elbows, hands, hips, knees, ankles, or feet. No DJD C spine, T spine, or L/S spine  LYMPH: No lymphadenopathy noted  SKIN: No rashes or lesions  NERVOUS SYSTEM:  Alert & Oriented X3, normal cognitive function. Motor Strength 5/5 right upper and right lower.  5/5 left upper and left lower extremities, DTRs 2+ intact and symmetric    LABS:        CBC Full  -  ( 30 Mar 2018 07:44 )  WBC Count : 9.76 K/uL  Hemoglobin : 10.0 g/dL  Hematocrit : 30.1 %  Platelet Count - Automated : 238 K/uL  Mean Cell Volume : 90.1 fl  Mean Cell Hemoglobin : 29.9 pg  Mean Cell Hemoglobin Concentration : 33.2 gm/dL  Auto Neutrophil # : x  Auto Lymphocyte # : x  Auto Monocyte # : x  Auto Eosinophil # : x  Auto Basophil # : x  Auto Neutrophil % : x  Auto Lymphocyte % : x  Auto Monocyte % : x  Auto Eosinophil % : x  Auto Basophil % : x    03-30    135  |  98  |  16  ----------------------------<  294<H>  4.3   |  27  |  0.70    Ca    8.2<L>      30 Mar 2018 06:47            CAPILLARY BLOOD GLUCOSE      POCT Blood Glucose.: 411 mg/dL (30 Mar 2018 18:43)  POCT Blood Glucose.: 211 mg/dL (30 Mar 2018 17:36)  POCT Blood Glucose.: 235 mg/dL (30 Mar 2018 16:28)  POCT Blood Glucose.: 231 mg/dL (30 Mar 2018 14:16)  POCT Blood Glucose.: 422 mg/dL (30 Mar 2018 12:00)  POCT Blood Glucose.: 346 mg/dL (30 Mar 2018 09:09)  POCT Blood Glucose.: 321 mg/dL (30 Mar 2018 07:51)  POCT Blood Glucose.: 318 mg/dL (29 Mar 2018 21:21)      RADIOLOGY & ADDITIONAL TESTS:    Consultant(s):    Care Discussed with Consultants/Other Providers [ ] YES  [ ] NO Patient is a 42y old  Male who presents with a chief complaint of Right Tib Fib Fracture (30 Mar 2018 12:51)      HPI:  42y Male community ambulator presents c/o R lower extremity pain s/p fall. Pt is a community ambulatory at baseline. Pt is unable to bear weight on extremity. Pt denies numbness tingling paresthesias in affected limb. Pt denies head strike or LOC and denies any other orthopedic injuries at this time. (28 Mar 2018 12:42) He is s/p right  tibial IM nail.  Patient with unexplained tachycardia - no evidence of pulmonary problems. no evidence of significant bleed and no evidenced of dvt or pulmonary  congestion.  Labs showed markedly elevated D-Dimer .  No evidence of DVT.  Unexplained tachycardia needs to have a w/u to r/o PE.  Procedure:    Diagnosis:    Pre-Op Diagnosis:  Type I or II open displaced oblique fracture of shaft of right tibia, initial encounter  03/28/2018    Active  Tyler Bill.     Post-Op Dx:  Type I or II open displaced oblique fracture of shaft of right tibia, initial encounter  03/28/2018    Active  Tyler Bill.    Procedure:    Procedure:  Intramedullary nailing for fracture of right tibia  03/28/2018  I&D and IMN of Right Tibia Fracture  Active  [id number].      MEDICATIONS  (STANDING):  aspirin enteric coated 325 milliGRAM(s) Oral two times a day  ATENolol  Tablet 50 milliGRAM(s) Oral at bedtime  dextrose 5%. 1000 milliLiter(s) (50 mL/Hr) IV Continuous <Continuous>  dextrose 50% Injectable 12.5 Gram(s) IV Push once  dextrose 50% Injectable 25 Gram(s) IV Push once  dextrose 50% Injectable 25 Gram(s) IV Push once  ferrous    sulfate 325 milliGRAM(s) Oral three times a day with meals  insulin lispro (HumaLOG) corrective regimen sliding scale   SubCutaneous three times a day before meals  insulin lispro (HumaLOG) corrective regimen sliding scale   SubCutaneous at bedtime  insulin lispro Injectable (HumaLOG) 10 Unit(s) SubCutaneous three times a day with meals  levothyroxine 150 MICROGram(s) Oral daily  multivitamin 1 Tablet(s) Oral daily  simvastatin 20 milliGRAM(s) Oral at bedtime  sodium chloride 0.9%. 1000 milliLiter(s) (100 mL/Hr) IV Continuous <Continuous>    MEDICATIONS  (PRN):  acetaminophen   Tablet 650 milliGRAM(s) Oral every 6 hours PRN For Temp greater than 38 C (100.4 F)  aluminum hydroxide/magnesium hydroxide/simethicone Suspension 30 milliLiter(s) Oral four times a day PRN Indigestion  dextrose Gel 1 Dose(s) Oral once PRN Blood Glucose LESS THAN 70 milliGRAM(s)/deciliter  docusate sodium 100 milliGRAM(s) Oral three times a day PRN Constipation  glucagon  Injectable 1 milliGRAM(s) IntraMuscular once PRN Glucose LESS THAN 70 milligrams/deciliter  morphine  - Injectable 4 milliGRAM(s) IV Push every 4 hours PRN Severe Pain  ondansetron Injectable 4 milliGRAM(s) IV Push once PRN Nausea and/or Vomiting  ondansetron Injectable 4 milliGRAM(s) IV Push every 6 hours PRN Nausea and/or Vomiting  oxyCODONE    IR 10 milliGRAM(s) Oral every 4 hours PRN Moderate Pain  oxyCODONE    IR 5 milliGRAM(s) Oral every 4 hours PRN Mild Pain      Allergies    No Known Allergies    Intolerances      PAST MEDICAL HISTORY:  Poorly controled diabetes Type 1    PAST SURGICAL HISTORY:  No significant past surgical history      Diet:  (   ) Regular   (   ) Low Sodium   (   ) Low Cholesterol   ( x  ) Diabetic   (   ) Other    FAMILY HISTORY:  Diabetes      SOCIAL HISTORY:  Marital Status:  (   )    (   ) Single    (x  )    (   )   Occupation:   Lives with: (   ) alone  (   ) children   (   ) spouse   ( x  ) parents  (   ) other  Substance Use: ( x ) never used  (  ) other:  Tobacco Usage:  ( x  ) never smoked   (   ) former smoker   (   ) current smoker  (   ) pack years  (   ) last cigarette date  Alcohol Usage: never drinks  Advanced Directives: (   ) None    (   ) DNR    (   ) DNI    (   ) Health Care Proxy:     REVIEW OF SYSTEM:  CONSTITUTIONAL: No fever, No change in weight, No fatigue  HEAD: No headache, No dizziness, No recent trauma  EYES: No eye pain, No visual disturbances, No discharge  ENT:  No difficulty hearing, No tinnitus, No vertigo, No sinus pain, No throat pain  NECK: No pain, No stiffness  BREASTS: No pain, No masses, No nipple discharge  RESPIRATORY: No cough, No wheezing, No chills, No hemoptysis, No shortness of breath at rest or exertional shortness of breath  CARDIOVASCULAR: No chest pain, No palpitations, No dizziness, No CHF, No arrhythmia, No cardiomegaly, No leg swelling (+) tachycardia  GASTROINTESTINAL: No abdominal, No epigastric pain. No nausea, No vomiting, No hematemesis, No diarrhea, No constipation. No melena, No hematochezia. No GERD  GENITOURINARY: No dysuria, No frequency, No hematuria, No incontinence, No nocturia, No hesitancy,  SKIN: No itching, No burning, No rashes, No lesions   LYMPH NODES: No history of enlarged glands  ENDOCRINE: No heat or cold intolerance, No hair loss. No osteoporosis, No thyroid disease  MUSCULOSKELETAL: Tibial fracture IM nail  PSYCHIATRIC: No depression, No anxiety, No mood swings, No difficulty sleeping  HEME/LYMPH: No easy bruising, No anticoagulants, No bleeding disorder, No bleeding gums  ALLERGY AND IMMUNOLOGIC: No hives, No eczema  NEUROLOGICAL: No memory loss, No loss of strength, No numbness, No tremors    VITALS:  Vital Signs Last 24 Hrs  T(C): 37.2 (30 Mar 2018 17:30), Max: 37.4 (30 Mar 2018 00:42)  T(F): 98.9 (30 Mar 2018 17:30), Max: 99.4 (30 Mar 2018 00:42)  HR: 109 (30 Mar 2018 17:30) (106 - 123)  BP: 146/77 (30 Mar 2018 17:30) (109/70 - 146/77)  BP(mean): --  RR: 16 (30 Mar 2018 17:30) (16 - 16)  SpO2: 97% (30 Mar 2018 17:30) (92% - 97%)  I&O's Summary    29 Mar 2018 07:01  -  30 Mar 2018 07:00  --------------------------------------------------------  IN: 1580 mL / OUT: 1500 mL / NET: 80 mL    30 Mar 2018 07:01  -  30 Mar 2018 19:05  --------------------------------------------------------  IN: 920 mL / OUT: 1000 mL / NET: -80 mL        PHYSICAL EXAM:  GENERAL: NAD, well nourished and conversant  HEAD:  Atraumatic  EYES: EOM, PERRLA, conjunctiva pink and sclera white  ENT: No tonsillar erythema, exudates, or enlargement, moist mucous membranes, good dentition, no lesions  NECK: Supple, No JVD, normal thyroid, carotids with normal upstrokes and no bruits  CHEST/LUNG: Clear to auscultation bilaterally, No rales, rhonchi, wheezing, or rubs  HEART: Regular rate and rhythm, No murmurs, rubs, or gallops  ABDOMEN: Soft, nondistended, no masses, guarding, tenderness or rebound, bowel sounds present  EXTREMITIES:  2+ Peripheral Pulses, No clubbing, cyanosis, or edema. (+) IMN tight  tibia  LYMPH: No lymphadenopathy noted  SKIN: No rashes or lesions  NERVOUS SYSTEM:  Alert & Oriented X3, normal cognitive function. Motor Strength 5/5 right upper and right lower.  5/5 left upper and left lower extremities, DTRs 2+ intact and symmetric    LABS:        CBC Full  -  ( 30 Mar 2018 07:44 )  WBC Count : 9.76 K/uL  Hemoglobin : 10.0 g/dL  Hematocrit : 30.1 %  Platelet Count - Automated : 238 K/uL  Mean Cell Volume : 90.1 fl  Mean Cell Hemoglobin : 29.9 pg  Mean Cell Hemoglobin Concentration : 33.2 gm/dL  Auto Neutrophil # : x  Auto Lymphocyte # : x  Auto Monocyte # : x  Auto Eosinophil # : x  Auto Basophil # : x  Auto Neutrophil % : x  Auto Lymphocyte % : x  Auto Monocyte % : x  Auto Eosinophil % : x  Auto Basophil % : x    03-30    135  |  98  |  16  ----------------------------<  294<H>  4.3   |  27  |  0.70    Ca    8.2<L>      30 Mar 2018 06:47            CAPILLARY BLOOD GLUCOSE      POCT Blood Glucose.: 411 mg/dL (30 Mar 2018 18:43)  POCT Blood Glucose.: 211 mg/dL (30 Mar 2018 17:36)  POCT Blood Glucose.: 235 mg/dL (30 Mar 2018 16:28)  POCT Blood Glucose.: 231 mg/dL (30 Mar 2018 14:16)  POCT Blood Glucose.: 422 mg/dL (30 Mar 2018 12:00)  POCT Blood Glucose.: 346 mg/dL (30 Mar 2018 09:09)  POCT Blood Glucose.: 321 mg/dL (30 Mar 2018 07:51)  POCT Blood Glucose.: 318 mg/dL (29 Mar 2018 21:21)      RADIOLOGY & ADDITIONAL TESTS:    Consultant(s):    Care Discussed with Consultants/Other Providers [ ] YES  [ ] NO

## 2018-03-30 NOTE — DISCHARGE NOTE ADULT - CARE PROVIDER_API CALL
Mati Mascorro), Orthopaedic Surgery  [address] [place] [id number]  Phone: [phone]  Fax: [phone]

## 2018-03-30 NOTE — PROGRESS NOTE ADULT - ASSESSMENT
43 y/o M with uncontrolled DM1 with no known microvascular complications, hypothyroidism, presents with non-traumatic right tibial fracture now with glucose values > 400 (high risk patient with high level decision-making).

## 2018-03-30 NOTE — PROGRESS NOTE ADULT - PROBLEM SELECTOR PLAN 1
- patient with persistent hyperglycemia  - recommend additional dose of Lantus 15 units now (to be discussed with attending). Start Lantus 30 units for tomorrow at 2 pm  - increase Humalog to 10 units before meals  - c/w low correction scale  - consistent carb diet  - will follow - patient with persistent hyperglycemia  - recommend additional dose of Lantus 15 units now. Start Lantus 30 units for tomorrow at 2 pm  - increase Humalog to 10 units before meals  - change scale to moderate correction before meals and at bedtime   - consistent carb diet  - will follow

## 2018-03-30 NOTE — CONSULT NOTE ADULT - ASSESSMENT
42y Male community ambulator presents c/o R lower extremity pain s/p fall. Pt is a community ambulatory at baseline. Pt is unable to bear weight on extremity. Pt denies numbness tingling paresthesias in affected limb. Pt denies head strike or LOC and denies any other orthopedic injuries at this time. (28 Mar 2018 12:42) He is s/p right  tibial IM nail.  Patient with unexplained tachycardia - no evidence of pulmonary problems. no evidence of significant bleed and no evidenced of dvt or pulmonary  congestion.  Labs showed markedly elevated D-Dimer .  No evidence of DVT.  Unexplained tachycardia needs to have a w/u to r/o PE.  Procedure:    Diagnosis:    Pre-Op Diagnosis:  Type I or II open displaced oblique fracture of shaft of right tibia, initial encounter  03/28/2018    Active  Tyler Bill.     Post-Op Dx:  Type I or II open displaced oblique fracture of shaft of right tibia, initial encounter  03/28/2018    Active  Tyler Bill.    Procedure:    Procedure:  Intramedullary nailing for fracture of right tibia  03/28/2018  I&D and IMN of Right Tibia Fracture  Active  [id number].

## 2018-03-30 NOTE — DISCHARGE NOTE ADULT - REASON FOR ADMISSION
Right Tib Fib Fracture Right Tib Fib Fracture  Open Reduction Internal Fixation / Surgical Repair R Tibia-fibula

## 2018-03-30 NOTE — PROGRESS NOTE ADULT - PROBLEM SELECTOR PLAN 3
- will need outpatient DEXA as patient experienced a non-traumatic fracture  - f/u Vitamin D 25 level - will need outpatient DEXA as patient experienced a non-traumatic fracture  - f/u Vitamin D 25 level  - obtain PTH level (to r/o secondary causes of osteoporosis) - will need outpatient DEXA as patient experienced a non-traumatic fracture  -Noted to have a low calcium level  - f/u Vitamin D 25 level  - obtain PTH level (to r/o secondary causes of osteoporosis)

## 2018-03-30 NOTE — DISCHARGE NOTE ADULT - PATIENT PORTAL LINK FT
You can access the [facility] Patient Portal, offered by [facility], by registering with the following website: [email or web address]

## 2018-03-30 NOTE — DISCHARGE NOTE ADULT - PLAN OF CARE
Painless ambulation. Follow up with Dr. Mascorro in 10-14 days from surgery--call office to schedule appointment.   Activity: Nonweight Bearing on Right Lower Extremity  DVT prophylaxis: Take Aspirin 325mg Orally Twice Daily for SIX WEEKS.  Bathing: Keep dressing clean and dry. No direct water to dressing. Follow up with Dr. Mascorro in 10-14 days from surgery--call office to schedule appointment.   Activity: Nonweight Bearing on Right Lower Extremity. Follow up with Endocrinologist Dr Paul on 5/18/18 @0900 at [address] 4th floor  DVT prophylaxis: Take Aspirin 325mg Orally Twice Daily for SIX WEEKS.  Bathing: Keep dressing clean and dry. No direct water to dressing.

## 2018-03-30 NOTE — PROGRESS NOTE ADULT - SUBJECTIVE AND OBJECTIVE BOX
Orthopedics Post-op Check    POD 2 R Tibia Fracture ORIF  Pt seen and examined. Pain is controlled. Feeling well overall.  Had some issues with ambulating with crutches, did better with walker    Vital Signs Last 24 Hrs  T(C): 37.4 (30 Mar 2018 05:15), Max: 37.4 (29 Mar 2018 13:52)  T(F): 99.4 (30 Mar 2018 05:15), Max: 99.4 (30 Mar 2018 00:42)  HR: 106 (30 Mar 2018 05:15) (96 - 110)  BP: 134/78 (30 Mar 2018 05:15) (103/64 - 134/78)  BP(mean): --  RR: 16 (30 Mar 2018 05:15) (16 - 16)  SpO2: 92% (30 Mar 2018 05:15) (92% - 96%)               Exam:  NAD AAOx3    R/L LE: Dressing clean, dry and intact;   No Calf TTP B/L  +DP Pulse 2+/4  SILT L2-S1  +EHL FHL TA GS      A/P:  42y M/F s/p R Tibia IMN  POD #2  -Pain control  - DVT ppx  - NWB RLE/PT/OOB  - FU labs  - Med mgmt  - D/C Planning Home

## 2018-03-30 NOTE — CHART NOTE - NSCHARTNOTEFT_GEN_A_CORE
Patient resting without complaints.  Denies chest pain, SOB, N/V.    T(C): 36 (03-28-18 @ 17:35)  T(F): 96.8 (03-28-18 @ 17:35)  HR: 91 (03-28-18 @ 18:45)  BP: 131/79 (03-28-18 @ 18:45)  RR: 14 (03-28-18 @ 18:45)  SpO2: 96% (03-28-18 @ 18:45)      Exam:  Alert and Oriented, No Acute Distress    R lower Extremity:  Dsg CDI  Calf Soft, Non-tender  +PF/DF  Sensation grossly intact                            11.9   13.7  )-----------( 262      ( 28 Mar 2018 18:19 )             35.8        139  |  101  |  17  ----------------------------<  199<H>  4.2   |  27  |  0.64      A/P: 42y Male S/P I&D and IMN of Right Tibia; Stable    -Pain Control  -DVT PPx; IS  -Am labs  -PT Eval-NWB RLE  -Continue Current Tx.    Celena Villela PA-C  Orthopedic Surgery  Pagers [phone]
Ortho PA Note      Notified by RN-Tony-pts  s/p pain medication.  Pt denies pain currently, palpitations, cp, sob. States he feels "o.k."    VS:  T 99.4F   129/88   123   16   94%RA  I/O=920/800cc this shift  VT=020pe this shift      PE: Gen: A&OX3, NAD  Pulm: S1S2 Tachy  Ext: LLE: No Calve tenderness, IPC put back on        RLE: No calve tenderness, new ACE dressing applied, distal medial incision with sutures in place with mild serosanguinous drainage      EKG: 3/30/18 @14:51: Sinus Tachy @109bpm    A/P: Medicine consult called.  D-Dimer, TSH, T3,T4 check ordered per medicine---will F/U      EMMA Zacarias  Orthopedic Surgery  [phone]

## 2018-03-30 NOTE — DISCHARGE NOTE ADULT - CARE PLAN
Principal Discharge DX:	Tibia and fibula open fracture, right  Goal:	Painless ambulation.  Assessment and plan of treatment:	Follow up with Dr. Mascorro in 10-14 days from surgery--call office to schedule appointment.   Activity: Nonweight Bearing on Right Lower Extremity  DVT prophylaxis: Take Aspirin 325mg Orally Twice Daily for SIX WEEKS.  Bathing: Keep dressing clean and dry. No direct water to dressing. Principal Discharge DX:	Tibia and fibula open fracture, right  Goal:	Painless ambulation.  Assessment and plan of treatment:	Follow up with Dr. Mascorro in 10-14 days from surgery--call office to schedule appointment.   Activity: Nonweight Bearing on Right Lower Extremity. Follow up with Endocrinologist Dr Paul on 5/18/18 @0900 at [address] 4th floor  DVT prophylaxis: Take Aspirin 325mg Orally Twice Daily for SIX WEEKS.  Bathing: Keep dressing clean and dry. No direct water to dressing.

## 2018-03-30 NOTE — DISCHARGE NOTE ADULT - MEDICATION SUMMARY - MEDICATIONS TO TAKE
I will START or STAY ON the medications listed below when I get home from the hospital:    physical therapy  -- s/p Right tibia open reduction internal fixation    non-weight bearing right leg    evaluate and treat  -- Indication: For Therapy    acetaminophen 325 mg oral tablet  -- 2 tab(s) by mouth every 6 hours, As needed, For Temp greater than 38 C (100.4 F)  -- Indication: For Fever    oxyCODONE 5 mg oral tablet  -- 1-2 tab(s) by mouth every 4-6 hours, As needed, for Pain MDD:8  -- Indication: For Pain    aspirin 325 mg oral delayed release tablet  -- 1 tab(s) by mouth 2 times a day x 6 WEEKS Total (blood thinner) then STOP MDD:2  -- Indication: For Anticoagulation    insulin glargine  -- 31 unit(s) subcutaneous once a day  AS PER ENDOCRINE  -- Indication: For Diabetes    HumaLOG 100 units/mL subcutaneous solution  -- As Per ENDOCRINE:  Humalog SCALE WITH MEALS as follows:  Glucose levels: :    6 Units  Glucose Levels: 151-250:  8 Units  Glucose Levels: > 251:      10 Units  -- Indication: For Diabetes    atenolol 50 mg oral tablet  -- 1 tab(s) by mouth once a day (at bedtime)  -- Indication: For Blood pressure    docusate sodium 100 mg oral capsule  -- 1 cap(s) by mouth 3 times a day  -- Indication: For stool softener    levothyroxine 150 mcg (0.15 mg) oral tablet  -- 1 tab(s) by mouth once a day  -- Indication: For Hypothyroidism     Multiple Vitamins oral tablet  -- 1 tab(s) by mouth once a day  -- Indication: For supplement    cholecalciferol oral tablet  -- 1000 unit(s) by mouth once a day  -- Indication: For supplement

## 2018-03-30 NOTE — DISCHARGE NOTE ADULT - HOSPITAL COURSE
Admit: 3/28/18  Dx: Right Open Tibia Fibula Fracture  Procedure: Incision and Drainage/Intramedullary nailing for fracture of right tibia    Surgeon: Dr. Mascorro     History of Present Illness:  Reason for Admission: Right Tib Fib Fracture	  History of Present Illness: 	  42y Male community ambulator presents c/o R lower extremity pain s/p fall. Pt is a community ambulatory at baseline. Pt is unable to bear weight on extremity. Pt denies numbness tingling paresthesias in affected limb. Pt denies headstrike or LOC and denies any other orthopedic injuries at this time.           Review of Systems:  Other Review of Systems: All other review of systems negative, except as noted in HPI	      Allergies and Intolerances:        Allergies:  	No Known Allergies:     Home Medications:   * Outpatient Medication Status not yet specified  .    Patient History:    Past Medical History:  Diabetes.     Past Surgical History:  No significant past surgical history.    Hospital Course:  3/28/18: Patient presented to [facility] Emergency Room S/P Mechanical Fall. In Emergency Room found to have Open Right Tibia/Fibula Fracture. Patient underwent Incision and Drainage/Intramedullary  nailing for fracture of right tibia. Patient tolerated the procedure, no complications.   3/29/18: Patient had Endocrinology consultation whom recommended check vitamin D 25 level/DEXA and Thyroid Function Tests as outpatient.   Patient was evaluated and treated by Physical Therapy whom recommended Home for discharge disposition. Admit: 3/28/18  Dx: Right Open Tibia Fibula Fracture  Procedure: Incision and Drainage/Intramedullary nailing for fracture of right tibia    Surgeon: Dr. Mascorro     History of Present Illness:  Reason for Admission: Right Tib Fib Fracture	  History of Present Illness: 	  42y Male community ambulator presents c/o R lower extremity pain s/p fall. Pt is a community ambulatory at baseline. Pt is unable to bear weight on extremity. Pt denies numbness tingling paresthesias in affected limb. Pt denies headstrike or LOC and denies any other orthopedic injuries at this time.           Review of Systems:  Other Review of Systems: All other review of systems negative, except as noted in HPI	      Allergies and Intolerances:        Allergies:  	No Known Allergies:     Home Medications:   * Outpatient Medication Status not yet specified  .    Patient History:    Past Medical History:  Diabetes.     Past Surgical History:  No significant past surgical history.    Hospital Course:  3/28/18: Patient presented to [facility] Emergency Room S/P Mechanical Fall. In Emergency Room found to have Open Right Tibia/Fibula Fracture. Patient underwent Incision and Drainage/Intramedullary  nailing for fracture of right tibia. Patient tolerated the procedure, no complications.   3/29/18: Patient had Endocrinology consultation whom recommended check vitamin D 25 level/DEXA and Thyroid Function Tests as outpatient. Follow up with Endocrinologist Dr Paul on 5/18/18 @0900 at [address] 4th floor  Patient was evaluated and treated by Physical Therapy whom recommended Home for discharge disposition. Admit: 3/28/18  Dx: Right Open Tibia Fibula Fracture  Procedure: Incision and Drainage/Intramedullary nailing for fracture of right tibia    Surgeon: Dr. Mascorro     History of Present Illness:  Reason for Admission: Right Tib Fib Fracture	  History of Present Illness: 	  42y Male community ambulator presents c/o R lower extremity pain s/p fall. Pt is a community ambulatory at baseline. Pt is unable to bear weight on extremity. Pt denies numbness tingling paresthesias in affected limb. Pt denies headstrike or LOC and denies any other orthopedic injuries at this time.           Review of Systems:  Other Review of Systems: All other review of systems negative, except as noted in HPI	      Allergies and Intolerances:        Allergies:  	No Known Allergies:     Home Medications:   * Outpatient Medication Status not yet specified  .    Patient History:    Past Medical History:  Diabetes.     Past Surgical History:  No significant past surgical history.    Hospital Course:  3/28/18: Patient presented to [facility] Emergency Room S/P Mechanical Fall. In Emergency Room found to have Open Right Tibia/Fibula Fracture. Patient underwent Incision and Drainage/Intramedullary  nailing for fracture of right tibia. Patient tolerated the procedure, no complications.   3/29/18: Patient had Endocrinology consultation whom recommended check vitamin D 25 level/DEXA and Thyroid Function Tests as outpatient. Follow up with Endocrinologist Dr Paul on 5/18/18 @0900 at [address] 4th floor  Patient was evaluated and treated by Physical Therapy whom recommended Home for discharge disposition.   Patient cleared by PT on 4/3/18 for discharge home.

## 2018-03-30 NOTE — PROGRESS NOTE ADULT - SUBJECTIVE AND OBJECTIVE BOX
Chief Complaint: f/u DM1    History:  Patient continues to have severe hyperglycemia. Eating well. Had cheerios for breakfast and then had fish for lunch (which his mother brought from home). Has not had a BM in 2 days. No abdominal pain, nausea, vomiting, diarrhea.     MEDICATIONS  (STANDING):  aspirin enteric coated 325 milliGRAM(s) Oral two times a day  ATENolol  Tablet 50 milliGRAM(s) Oral at bedtime  dextrose 5%. 1000 milliLiter(s) (50 mL/Hr) IV Continuous <Continuous>  dextrose 50% Injectable 12.5 Gram(s) IV Push once  dextrose 50% Injectable 25 Gram(s) IV Push once  dextrose 50% Injectable 25 Gram(s) IV Push once  ferrous    sulfate 325 milliGRAM(s) Oral three times a day with meals  insulin glargine Injectable (LANTUS) 15 Unit(s) SubCutaneous <User Schedule>  insulin lispro (HumaLOG) corrective regimen sliding scale   SubCutaneous three times a day before meals  insulin lispro (HumaLOG) corrective regimen sliding scale   SubCutaneous at bedtime  insulin lispro Injectable (HumaLOG) 6 Unit(s) SubCutaneous three times a day with meals  levothyroxine 150 MICROGram(s) Oral daily  multivitamin 1 Tablet(s) Oral daily  simvastatin 20 milliGRAM(s) Oral at bedtime  sodium chloride 0.9%. 1000 milliLiter(s) (100 mL/Hr) IV Continuous <Continuous>    MEDICATIONS  (PRN):  acetaminophen   Tablet 650 milliGRAM(s) Oral every 6 hours PRN For Temp greater than 38 C (100.4 F)  aluminum hydroxide/magnesium hydroxide/simethicone Suspension 30 milliLiter(s) Oral four times a day PRN Indigestion  dextrose Gel 1 Dose(s) Oral once PRN Blood Glucose LESS THAN 70 milliGRAM(s)/deciliter  docusate sodium 100 milliGRAM(s) Oral three times a day PRN Constipation  glucagon  Injectable 1 milliGRAM(s) IntraMuscular once PRN Glucose LESS THAN 70 milligrams/deciliter  morphine  - Injectable 4 milliGRAM(s) IV Push every 4 hours PRN Severe Pain  ondansetron Injectable 4 milliGRAM(s) IV Push once PRN Nausea and/or Vomiting  ondansetron Injectable 4 milliGRAM(s) IV Push every 6 hours PRN Nausea and/or Vomiting  oxyCODONE    IR 10 milliGRAM(s) Oral every 4 hours PRN Moderate Pain  oxyCODONE    IR 5 milliGRAM(s) Oral every 4 hours PRN Mild Pain      Allergies  No Known Allergies      Review of Systems:  Constitutional: No fever  Cardiovascular: No chest pain, palpitations  Respiratory: No SOB, no cough  GI: No nausea, vomiting, abdominal pain  Endocrine: no polyuria, polydipsia    ALL OTHER SYSTEMS REVIEWED AND NEGATIVE      PHYSICAL EXAM:  VITALS: T(C): 37.4 (03-30-18 @ 13:54)  T(F): 99.4 (03-30-18 @ 13:54), Max: 99.4 (03-30-18 @ 00:42)  HR: 123 (03-30-18 @ 13:54) (100 - 123)  BP: 129/88 (03-30-18 @ 13:54) (109/70 - 134/78)  RR:  (16 - 16)  SpO2:  (92% - 94%)  Wt(kg): --  GENERAL: NAD, well-developed  RESPIRATORY: Clear to auscultation bilaterally; No rales, rhonchi, wheezing, or rubs  CARDIOVASCULAR: Regular rate and rhythm; No murmurs  GI: Soft, nontender, non distended  PSYCH: Alert and oriented x 3, reactive affect      POCT Blood Glucose.: 231 mg/dL (03-30-18 @ 14:16) L 15  POCT Blood Glucose.: 422 mg/dL (03-30-18 @ 12:00) H 6, 6  POCT Blood Glucose.: 346 mg/dL (03-30-18 @ 09:09) H 6, 4  POCT Blood Glucose.: 321 mg/dL (03-30-18 @ 07:51)  POCT Blood Glucose.: 318 mg/dL (03-29-18 @ 21:21) H 2  POCT Blood Glucose.: 388 mg/dL (03-29-18 @ 17:49) H 6, 5  POCT Blood Glucose.: >600 mg/dL (03-29-18 @ 17:47)  POCT Blood Glucose.: 440 mg/dL (03-29-18 @ 13:46) L 15  POCT Blood Glucose.: 515 mg/dL (03-29-18 @ 13:43)  POCT Blood Glucose.: 484 mg/dL (03-29-18 @ 11:57)  POCT Blood Glucose.: 459 mg/dL (03-29-18 @ 11:55)  POCT Blood Glucose.: 368 mg/dL (03-29-18 @ 08:36)  POCT Blood Glucose.: 239 mg/dL (03-28-18 @ 21:58)  POCT Blood Glucose.: 180 mg/dL (03-28-18 @ 18:01)  POCT Blood Glucose.: 154 mg/dL (03-28-18 @ 15:28)  POCT Blood Glucose.: 153 mg/dL (03-28-18 @ 13:12)  POCT Blood Glucose.: 75 mg/dL (03-28-18 @ 10:59)      03-30    135  |  98  |  16  ----------------------------<  294<H>  4.3   |  27  |  0.70    EGFR if : 136  EGFR if non : 117    Ca    8.2<L>      03-30  Mg     1.9     03-28  Phos  1.8     03-28    TPro  7.4  /  Alb  3.8  /  TBili  0.4  /  DBili  x   /  AST  29  /  ALT  35  /  AlkPhos  83  03-28          Thyroid Function Tests:      Hemoglobin A1C, Whole Blood: 8.0 % <H> [4.0 - 5.6] (03-29-18 @ 07:41)

## 2018-03-30 NOTE — CONSULT NOTE ADULT - PROBLEM SELECTOR RECOMMENDATION 9
- patient with severe hyperglycemia after not receiving basal insulin last night  - Lantus 15 units given at 2 pm; stat blood work reveals no evidence of DKA  - start Humalog 6 units with meals (will resume diet)  - will order Lantus 15 units to be given everyday at 2 pm  - c/w low correction scale qac and qhs  - will follow   - patient would like to follow up in our clinic on discharge. He can call [phone] to make an appt
Stable post op   Incentive spirometry

## 2018-03-31 LAB
ANION GAP SERPL CALC-SCNC: 11 MMOL/L — SIGNIFICANT CHANGE UP (ref 5–17)
BUN SERPL-MCNC: 12 MG/DL — SIGNIFICANT CHANGE UP (ref 7–23)
CALCIUM SERPL-MCNC: 7.5 MG/DL — LOW (ref 8.4–10.5)
CALCIUM SERPL-MCNC: 7.8 MG/DL — LOW (ref 8.4–10.5)
CHLORIDE SERPL-SCNC: 98 MMOL/L — SIGNIFICANT CHANGE UP (ref 96–108)
CO2 SERPL-SCNC: 28 MMOL/L — SIGNIFICANT CHANGE UP (ref 22–31)
CREAT SERPL-MCNC: 0.62 MG/DL — SIGNIFICANT CHANGE UP (ref 0.5–1.3)
GLUCOSE BLDC GLUCOMTR-MCNC: 147 MG/DL — HIGH (ref 70–99)
GLUCOSE BLDC GLUCOMTR-MCNC: 164 MG/DL — HIGH (ref 70–99)
GLUCOSE BLDC GLUCOMTR-MCNC: 293 MG/DL — HIGH (ref 70–99)
GLUCOSE BLDC GLUCOMTR-MCNC: 296 MG/DL — HIGH (ref 70–99)
GLUCOSE BLDC GLUCOMTR-MCNC: 402 MG/DL — HIGH (ref 70–99)
GLUCOSE BLDC GLUCOMTR-MCNC: 51 MG/DL — LOW (ref 70–99)
GLUCOSE BLDC GLUCOMTR-MCNC: 74 MG/DL — SIGNIFICANT CHANGE UP (ref 70–99)
GLUCOSE BLDC GLUCOMTR-MCNC: 84 MG/DL — SIGNIFICANT CHANGE UP (ref 70–99)
GLUCOSE SERPL-MCNC: 230 MG/DL — HIGH (ref 70–99)
POTASSIUM SERPL-MCNC: 3.7 MMOL/L — SIGNIFICANT CHANGE UP (ref 3.5–5.3)
POTASSIUM SERPL-SCNC: 3.7 MMOL/L — SIGNIFICANT CHANGE UP (ref 3.5–5.3)
PTH-INTACT FLD-MCNC: 42 PG/ML — SIGNIFICANT CHANGE UP (ref 15–65)
SODIUM SERPL-SCNC: 137 MMOL/L — SIGNIFICANT CHANGE UP (ref 135–145)
TSH SERPL-MCNC: 0.42 UIU/ML — SIGNIFICANT CHANGE UP (ref 0.27–4.2)

## 2018-03-31 RX ORDER — INSULIN LISPRO 100/ML
14 VIAL (ML) SUBCUTANEOUS
Qty: 0 | Refills: 0 | Status: DISCONTINUED | OUTPATIENT
Start: 2018-03-31 | End: 2018-03-31

## 2018-03-31 RX ORDER — INSULIN GLARGINE 100 [IU]/ML
40 INJECTION, SOLUTION SUBCUTANEOUS
Qty: 0 | Refills: 0 | Status: DISCONTINUED | OUTPATIENT
Start: 2018-03-31 | End: 2018-04-02

## 2018-03-31 RX ORDER — INSULIN LISPRO 100/ML
12 VIAL (ML) SUBCUTANEOUS
Qty: 0 | Refills: 0 | Status: DISCONTINUED | OUTPATIENT
Start: 2018-04-01 | End: 2018-04-02

## 2018-03-31 RX ORDER — CHOLECALCIFEROL (VITAMIN D3) 125 MCG
1000 CAPSULE ORAL DAILY
Qty: 0 | Refills: 0 | Status: DISCONTINUED | OUTPATIENT
Start: 2018-03-31 | End: 2018-04-03

## 2018-03-31 RX ADMIN — Medication 10 UNIT(S): at 12:49

## 2018-03-31 RX ADMIN — Medication 1000 UNIT(S): at 14:00

## 2018-03-31 RX ADMIN — Medication 325 MILLIGRAM(S): at 17:32

## 2018-03-31 RX ADMIN — Medication 6: at 12:49

## 2018-03-31 RX ADMIN — Medication 100 MILLIGRAM(S): at 22:06

## 2018-03-31 RX ADMIN — INSULIN GLARGINE 40 UNIT(S): 100 INJECTION, SOLUTION SUBCUTANEOUS at 14:00

## 2018-03-31 RX ADMIN — SIMVASTATIN 20 MILLIGRAM(S): 20 TABLET, FILM COATED ORAL at 22:06

## 2018-03-31 RX ADMIN — Medication 2: at 17:31

## 2018-03-31 RX ADMIN — Medication 10 UNIT(S): at 07:58

## 2018-03-31 RX ADMIN — ATENOLOL 50 MILLIGRAM(S): 25 TABLET ORAL at 22:06

## 2018-03-31 RX ADMIN — Medication 325 MILLIGRAM(S): at 07:59

## 2018-03-31 RX ADMIN — OXYCODONE HYDROCHLORIDE 10 MILLIGRAM(S): 5 TABLET ORAL at 14:00

## 2018-03-31 RX ADMIN — Medication 325 MILLIGRAM(S): at 12:51

## 2018-03-31 RX ADMIN — Medication 150 MICROGRAM(S): at 05:43

## 2018-03-31 RX ADMIN — Medication 100 MILLIGRAM(S): at 05:43

## 2018-03-31 RX ADMIN — Medication 14 UNIT(S): at 17:31

## 2018-03-31 RX ADMIN — Medication 325 MILLIGRAM(S): at 05:43

## 2018-03-31 RX ADMIN — Medication 6: at 07:58

## 2018-03-31 RX ADMIN — Medication 325 MILLIGRAM(S): at 17:33

## 2018-03-31 RX ADMIN — OXYCODONE HYDROCHLORIDE 10 MILLIGRAM(S): 5 TABLET ORAL at 14:30

## 2018-03-31 RX ADMIN — Medication 1 TABLET(S): at 12:51

## 2018-03-31 NOTE — PROGRESS NOTE ADULT - PROBLEM SELECTOR PLAN 1
- patient with persistent hyperglycemia  - increase Lantus to 40 units daily  - increase Humalog to 14 units before meals  - c/w moderate correction before meals and at bedtime   - consistent carb diet  - will follow - patient with persistent hyperglycemia, FS still in 200-300 range  - increase Lantus to 40 units daily  - increase Humalog to 14 units before meals  - c/w moderate correction before meals and at bedtime   - consistent carb diet  - will follow  Addendum: Patient noted to have hypoglycemia after receiving 14 units of Humalog for dinner. Will decrease to 12 units for AM.

## 2018-03-31 NOTE — PROGRESS NOTE ADULT - ATTENDING COMMENTS
No medical complications post-op to date and to proceed with physical therapy, as tolerated. Continues pulmonary toilet to lessen atelectasis, leg exercises as taught to lessen the risk of DVT and supervised pain medications for post-op pain control. tachycardia controlled with control of diabetes and Atenolol.

## 2018-03-31 NOTE — PROGRESS NOTE ADULT - PROBLEM SELECTOR PLAN 3
- will need outpatient DEXA as patient experienced a non-traumatic fracture  - Noted to have a low calcium level  - start vitamin D 1000 units qd  - obtain PTH level (to r/o secondary causes of osteoporosis)

## 2018-03-31 NOTE — PROGRESS NOTE ADULT - SUBJECTIVE AND OBJECTIVE BOX
Chief Complaint: f/u DM1    History:  Patient with persistent hyperglycemia. He states that he is only eating at meal times. Does not snack in between. FS are checked prior to eating (never has Fs after he starts eating). Eating most of his meals. Does not have abdominal pain, nausea, vomiting. He reports continued pain in the RLE.     MEDICATIONS  (STANDING):  aspirin enteric coated 325 milliGRAM(s) Oral two times a day  ATENolol  Tablet 50 milliGRAM(s) Oral at bedtime  dextrose 5%. 1000 milliLiter(s) (50 mL/Hr) IV Continuous <Continuous>  dextrose 50% Injectable 12.5 Gram(s) IV Push once  dextrose 50% Injectable 25 Gram(s) IV Push once  dextrose 50% Injectable 25 Gram(s) IV Push once  ferrous    sulfate 325 milliGRAM(s) Oral three times a day with meals  insulin glargine Injectable (LANTUS) 30 Unit(s) SubCutaneous <User Schedule>  insulin lispro (HumaLOG) corrective regimen sliding scale   SubCutaneous three times a day before meals  insulin lispro (HumaLOG) corrective regimen sliding scale   SubCutaneous at bedtime  insulin lispro Injectable (HumaLOG) 10 Unit(s) SubCutaneous three times a day with meals  levothyroxine 150 MICROGram(s) Oral daily  multivitamin 1 Tablet(s) Oral daily  simvastatin 20 milliGRAM(s) Oral at bedtime  sodium chloride 0.9%. 1000 milliLiter(s) (100 mL/Hr) IV Continuous <Continuous>    MEDICATIONS  (PRN):  acetaminophen   Tablet 650 milliGRAM(s) Oral every 6 hours PRN For Temp greater than 38 C (100.4 F)  aluminum hydroxide/magnesium hydroxide/simethicone Suspension 30 milliLiter(s) Oral four times a day PRN Indigestion  dextrose Gel 1 Dose(s) Oral once PRN Blood Glucose LESS THAN 70 milliGRAM(s)/deciliter  docusate sodium 100 milliGRAM(s) Oral three times a day PRN Constipation  glucagon  Injectable 1 milliGRAM(s) IntraMuscular once PRN Glucose LESS THAN 70 milligrams/deciliter  morphine  - Injectable 4 milliGRAM(s) IV Push every 4 hours PRN Severe Pain  ondansetron Injectable 4 milliGRAM(s) IV Push once PRN Nausea and/or Vomiting  ondansetron Injectable 4 milliGRAM(s) IV Push every 6 hours PRN Nausea and/or Vomiting  oxyCODONE    IR 10 milliGRAM(s) Oral every 4 hours PRN Moderate Pain  oxyCODONE    IR 5 milliGRAM(s) Oral every 4 hours PRN Mild Pain      Allergies  No Known Allergies      Review of Systems:  Constitutional: No fever  Cardiovascular: No chest pain, palpitations  Respiratory: No SOB, no cough  GI: No nausea, vomiting, abdominal pain    ALL OTHER SYSTEMS REVIEWED AND NEGATIVE      PHYSICAL EXAM:  VITALS: T(C): 36.8 (03-31-18 @ 08:00)  T(F): 98.2 (03-31-18 @ 08:00), Max: 99.8 (03-31-18 @ 00:49)  HR: 80 (03-31-18 @ 08:15) (80 - 123)  BP: 96/72 (03-31-18 @ 08:00) (96/72 - 146/77)  RR:  (16 - 16)  SpO2:  (94% - 97%)  Wt(kg): --  GENERAL: NAD,  well-developed  RESPIRATORY: Clear to auscultation bilaterally; No rales, rhonchi, wheezing, or rubs  CARDIOVASCULAR: Regular rate and rhythm; No murmurs  GI: Soft, nontender, normal bowel sounds  PSYCH: Alert and oriented x 3, reactive affect    POCT Blood Glucose.: 296 mg/dL (03-31-18 @ 12:29)  POCT Blood Glucose.: 293 mg/dL (03-31-18 @ 07:50)  POCT Blood Glucose.: 402 mg/dL (03-31-18 @ 07:48)  POCT Blood Glucose.: 228 mg/dL (03-30-18 @ 21:28)  POCT Blood Glucose.: 411 mg/dL (03-30-18 @ 18:43)  POCT Blood Glucose.: 211 mg/dL (03-30-18 @ 17:36)  POCT Blood Glucose.: 235 mg/dL (03-30-18 @ 16:28)  POCT Blood Glucose.: 231 mg/dL (03-30-18 @ 14:16)  POCT Blood Glucose.: 422 mg/dL (03-30-18 @ 12:00)  POCT Blood Glucose.: 346 mg/dL (03-30-18 @ 09:09)  POCT Blood Glucose.: 321 mg/dL (03-30-18 @ 07:51)  POCT Blood Glucose.: 318 mg/dL (03-29-18 @ 21:21)  POCT Blood Glucose.: 388 mg/dL (03-29-18 @ 17:49)  POCT Blood Glucose.: >600 mg/dL (03-29-18 @ 17:47)  POCT Blood Glucose.: 440 mg/dL (03-29-18 @ 13:46)  POCT Blood Glucose.: 515 mg/dL (03-29-18 @ 13:43)  POCT Blood Glucose.: 484 mg/dL (03-29-18 @ 11:57)  POCT Blood Glucose.: 459 mg/dL (03-29-18 @ 11:55)  POCT Blood Glucose.: 368 mg/dL (03-29-18 @ 08:36)  POCT Blood Glucose.: 239 mg/dL (03-28-18 @ 21:58)  POCT Blood Glucose.: 180 mg/dL (03-28-18 @ 18:01)  POCT Blood Glucose.: 154 mg/dL (03-28-18 @ 15:28)  POCT Blood Glucose.: 153 mg/dL (03-28-18 @ 13:12)      03-31    137  |  98  |  12  ----------------------------<  230<H>  3.7   |  28  |  0.62    EGFR if : 143  EGFR if non : 123    Ca    7.8<L>      03-31            Thyroid Function Tests:  03-30 @ 19:10 TSH 0.27 FreeT4 -- T3 83 Anti TPO -- Anti Thyroglobulin Ab -- TSI --      Hemoglobin A1C, Whole Blood: 8.0 % <H> [4.0 - 5.6] (03-29-18 @ 07:41) Chief Complaint: f/u DM1    History:  Patient with persistent hyperglycemia. He states that he is only eating at meal times. Does not snack in between. FS are checked prior to eating (never has Fs after he starts eating). Eating most of his meals. Does not have abdominal pain, nausea, vomiting. He reports continued pain in the RLE.     MEDICATIONS  (STANDING):  aspirin enteric coated 325 milliGRAM(s) Oral two times a day  ATENolol  Tablet 50 milliGRAM(s) Oral at bedtime  dextrose 5%. 1000 milliLiter(s) (50 mL/Hr) IV Continuous <Continuous>  dextrose 50% Injectable 12.5 Gram(s) IV Push once  dextrose 50% Injectable 25 Gram(s) IV Push once  dextrose 50% Injectable 25 Gram(s) IV Push once  ferrous    sulfate 325 milliGRAM(s) Oral three times a day with meals  insulin glargine Injectable (LANTUS) 30 Unit(s) SubCutaneous <User Schedule>  insulin lispro (HumaLOG) corrective regimen sliding scale   SubCutaneous three times a day before meals  insulin lispro (HumaLOG) corrective regimen sliding scale   SubCutaneous at bedtime  insulin lispro Injectable (HumaLOG) 10 Unit(s) SubCutaneous three times a day with meals  levothyroxine 150 MICROGram(s) Oral daily  multivitamin 1 Tablet(s) Oral daily  simvastatin 20 milliGRAM(s) Oral at bedtime  sodium chloride 0.9%. 1000 milliLiter(s) (100 mL/Hr) IV Continuous <Continuous>    MEDICATIONS  (PRN):  acetaminophen   Tablet 650 milliGRAM(s) Oral every 6 hours PRN For Temp greater than 38 C (100.4 F)  aluminum hydroxide/magnesium hydroxide/simethicone Suspension 30 milliLiter(s) Oral four times a day PRN Indigestion  dextrose Gel 1 Dose(s) Oral once PRN Blood Glucose LESS THAN 70 milliGRAM(s)/deciliter  docusate sodium 100 milliGRAM(s) Oral three times a day PRN Constipation  glucagon  Injectable 1 milliGRAM(s) IntraMuscular once PRN Glucose LESS THAN 70 milligrams/deciliter  morphine  - Injectable 4 milliGRAM(s) IV Push every 4 hours PRN Severe Pain  ondansetron Injectable 4 milliGRAM(s) IV Push once PRN Nausea and/or Vomiting  ondansetron Injectable 4 milliGRAM(s) IV Push every 6 hours PRN Nausea and/or Vomiting  oxyCODONE    IR 10 milliGRAM(s) Oral every 4 hours PRN Moderate Pain  oxyCODONE    IR 5 milliGRAM(s) Oral every 4 hours PRN Mild Pain      Allergies  No Known Allergies      Review of Systems:  Constitutional: No fever  Cardiovascular: No chest pain, palpitations  Respiratory: No SOB, no cough  GI: No nausea, vomiting, abdominal pain    ALL OTHER SYSTEMS REVIEWED AND NEGATIVE      PHYSICAL EXAM:  VITALS: T(C): 36.8 (03-31-18 @ 08:00)  T(F): 98.2 (03-31-18 @ 08:00), Max: 99.8 (03-31-18 @ 00:49)  HR: 80 (03-31-18 @ 08:15) (80 - 123)  BP: 96/72 (03-31-18 @ 08:00) (96/72 - 146/77)  RR:  (16 - 16)  SpO2:  (94% - 97%)  Wt(kg): --  GENERAL: NAD,  well-developed  RESPIRATORY: Clear to auscultation bilaterally; No rales, rhonchi, wheezing, or rubs  CARDIOVASCULAR: Regular rate and rhythm; No murmurs  GI: Soft, nontender, normal bowel sounds  PSYCH: Alert and oriented x 3, reactive affect    POCT Blood Glucose.: 296 mg/dL (03-31-18 @ 12:29) H 10, 6  POCT Blood Glucose.: 293 mg/dL (03-31-18 @ 07:50) H 10, 6  POCT Blood Glucose.: 402 mg/dL (03-31-18 @ 07:48)  POCT Blood Glucose.: 228 mg/dL (03-30-18 @ 21:28)  POCT Blood Glucose.: 411 mg/dL (03-30-18 @ 18:43) L 15  POCT Blood Glucose.: 211 mg/dL (03-30-18 @ 17:36) H 10  POCT Blood Glucose.: 235 mg/dL (03-30-18 @ 16:28)  POCT Blood Glucose.: 231 mg/dL (03-30-18 @ 14:16) L 15  POCT Blood Glucose.: 422 mg/dL (03-30-18 @ 12:00) H6, 6  POCT Blood Glucose.: 346 mg/dL (03-30-18 @ 09:09)  POCT Blood Glucose.: 321 mg/dL (03-30-18 @ 07:51)  POCT Blood Glucose.: 318 mg/dL (03-29-18 @ 21:21)  POCT Blood Glucose.: 388 mg/dL (03-29-18 @ 17:49)  POCT Blood Glucose.: >600 mg/dL (03-29-18 @ 17:47)  POCT Blood Glucose.: 440 mg/dL (03-29-18 @ 13:46)  POCT Blood Glucose.: 515 mg/dL (03-29-18 @ 13:43)  POCT Blood Glucose.: 484 mg/dL (03-29-18 @ 11:57)  POCT Blood Glucose.: 459 mg/dL (03-29-18 @ 11:55)  POCT Blood Glucose.: 368 mg/dL (03-29-18 @ 08:36)  POCT Blood Glucose.: 239 mg/dL (03-28-18 @ 21:58)  POCT Blood Glucose.: 180 mg/dL (03-28-18 @ 18:01)  POCT Blood Glucose.: 154 mg/dL (03-28-18 @ 15:28)  POCT Blood Glucose.: 153 mg/dL (03-28-18 @ 13:12)      03-31    137  |  98  |  12  ----------------------------<  230<H>  3.7   |  28  |  0.62    EGFR if : 143  EGFR if non : 123    Ca    7.8<L>      03-31            Thyroid Function Tests:  03-30 @ 19:10 TSH 0.27 FreeT4 -- T3 83 Anti TPO -- Anti Thyroglobulin Ab -- TSI --      Hemoglobin A1C, Whole Blood: 8.0 % <H> [4.0 - 5.6] (03-29-18 @ 07:41)

## 2018-03-31 NOTE — PROGRESS NOTE ADULT - ASSESSMENT
Patient is a 42y old  Male who presents with a chief complaint of Right Tib Fib Fracture (30 Mar 2018 12:51)    Post-op with tachycardia. CTA chest with no pulmonary embolism and RLL atelectasis. Diabetes better controlled by endocrinology.  Heart rate decreased on Atenolol with no respiratory distress.

## 2018-03-31 NOTE — PROGRESS NOTE ADULT - ASSESSMENT
41 y/o M with uncontrolled DM1 with no known microvascular complications, hypothyroidism, presents with non-traumatic right tibial fracture now with glucose values > 400 (high risk patient with high level decision-making).

## 2018-03-31 NOTE — PROGRESS NOTE ADULT - SUBJECTIVE AND OBJECTIVE BOX
ORTHO  Patient is a 42y old  Male who presents with a chief complaint of Right Tib Fib Fracture (30 Mar 2018 12:51)    Pt. resting without complaint    VS-  T(C): 37.7 (03-31-18 @ 05:15), Max: 37.7 (03-31-18 @ 00:49)  HR: 91 (03-31-18 @ 05:15) (90 - 123)  BP: 125/75 (03-31-18 @ 05:15) (102/60 - 146/77)  RR: 16 (03-31-18 @ 05:15) (16 - 16)  SpO2: 94% (03-31-18 @ 05:15) (94% - 97%)  Wt(kg): --    M.S.  A&O  Extremity-Right LE- dressings C/D/I, prox d/c' d wounds C/D/I                                                      distal- changed wound C/D/I  Neuro-              Motor- (+)ankle DF/PF limited 2nd to tenderness              Sensation- grossly to light touch              Calves- soft, nontender- PAS                               10.0   9.76  )-----------( 238      ( 30 Mar 2018 07:44 )             30.1     03-30    135  |  98  |  16  ----------------------------<  294<H>  4.3   |  27  |  0.70    Ca    8.2<L>      30 Mar 2018 06:47

## 2018-03-31 NOTE — PROGRESS NOTE ADULT - ASSESSMENT
Impression: Stable       Plan:   Continue present treatment                 Out of bed, ambulate, non-weight bearing                 Physical therapy, follow up                 Continue to monitor    Hang Amin PA-C  Orthopaedic Surgery  Team pager [phone]  [phone]

## 2018-03-31 NOTE — PROGRESS NOTE ADULT - SUBJECTIVE AND OBJECTIVE BOX
Patient is a 42y old  Male who presents with a chief complaint of Right Tib Fib Fracture (30 Mar 2018 12:51)    Post-op with tachycardia. CTA chest with no pulmonary embolism and RLL atelectasis. Diabetes better controlled by endocrinology.  Heart rate decreased on Atenolol with no respiratory distress.        MEDICATIONS  (STANDING):  aspirin enteric coated 325 milliGRAM(s) Oral two times a day  ATENolol  Tablet 50 milliGRAM(s) Oral at bedtime  cholecalciferol 1000 Unit(s) Oral daily  dextrose 5%. 1000 milliLiter(s) (50 mL/Hr) IV Continuous <Continuous>  dextrose 50% Injectable 12.5 Gram(s) IV Push once  dextrose 50% Injectable 25 Gram(s) IV Push once  dextrose 50% Injectable 25 Gram(s) IV Push once  ferrous    sulfate 325 milliGRAM(s) Oral three times a day with meals  insulin glargine Injectable (LANTUS) 40 Unit(s) SubCutaneous <User Schedule>  insulin lispro (HumaLOG) corrective regimen sliding scale   SubCutaneous three times a day before meals  insulin lispro (HumaLOG) corrective regimen sliding scale   SubCutaneous at bedtime  insulin lispro Injectable (HumaLOG) 14 Unit(s) SubCutaneous three times a day with meals  levothyroxine 150 MICROGram(s) Oral daily  multivitamin 1 Tablet(s) Oral daily  simvastatin 20 milliGRAM(s) Oral at bedtime  sodium chloride 0.9%. 1000 milliLiter(s) (100 mL/Hr) IV Continuous <Continuous>    MEDICATIONS  (PRN):  acetaminophen   Tablet 650 milliGRAM(s) Oral every 6 hours PRN For Temp greater than 38 C (100.4 F)  aluminum hydroxide/magnesium hydroxide/simethicone Suspension 30 milliLiter(s) Oral four times a day PRN Indigestion  dextrose Gel 1 Dose(s) Oral once PRN Blood Glucose LESS THAN 70 milliGRAM(s)/deciliter  docusate sodium 100 milliGRAM(s) Oral three times a day PRN Constipation  glucagon  Injectable 1 milliGRAM(s) IntraMuscular once PRN Glucose LESS THAN 70 milligrams/deciliter  morphine  - Injectable 4 milliGRAM(s) IV Push every 4 hours PRN Severe Pain  ondansetron Injectable 4 milliGRAM(s) IV Push once PRN Nausea and/or Vomiting  ondansetron Injectable 4 milliGRAM(s) IV Push every 6 hours PRN Nausea and/or Vomiting  oxyCODONE    IR 10 milliGRAM(s) Oral every 4 hours PRN Moderate Pain  oxyCODONE    IR 5 milliGRAM(s) Oral every 4 hours PRN Mild Pain      Allergies    No Known Allergies    Vital Signs Last 24 Hrs  T(C): 36.8 (31 Mar 2018 16:16), Max: 37.7 (31 Mar 2018 00:49)  T(F): 98.2 (31 Mar 2018 16:16), Max: 99.8 (31 Mar 2018 00:49)  HR: 95 (31 Mar 2018 16:16) (80 - 109)  BP: 132/75 (31 Mar 2018 16:16) (96/72 - 146/77)  BP(mean): --  RR: 17 (31 Mar 2018 16:16) (16 - 17)  SpO2: 95% (31 Mar 2018 16:16) (94% - 97%)    PHYSICAL EXAM:  GENERAL: NAD, well nourished and conversant  HEAD:  Atraumatic  EYES: EOM, PERRLA, conjunctiva pink and sclera white  ENT: No tonsillar erythema, exudates, or enlargement, moist mucous membranes, good dentition, no lesions  NECK: Supple, No JVD, normal thyroid, carotids with normal upstrokes and no bruits  CHEST/LUNG: Clear to auscultation bilaterally, No rales, rhonchi, wheezing, or rubs  HEART: Regular rate and rhythm, No murmurs, rubs, or gallops  ABDOMEN: Soft, nondistended, no masses, guarding, tenderness or rebound, bowel sounds present  EXTREMITIES:  Right lower extremity with 4+ peripheral edema.  LYMPH: No lymphadenopathy noted  SKIN: No rashes or lesions  NERVOUS SYSTEM:  Alert & Oriented X3, normal cognitive function. Motor Strength 5/5 right upper and right lower.  5/5 left upper and left lower extremities, DTRs 2+ intact and symmetric    LABS:        CBC Full  -  ( 30 Mar 2018 07:44 )  WBC Count : 9.76 K/uL  Hemoglobin : 10.0 g/dL  Hematocrit : 30.1 %  Platelet Count - Automated : 238 K/uL  Mean Cell Volume : 90.1 fl  Mean Cell Hemoglobin : 29.9 pg  Mean Cell Hemoglobin Concentration : 33.2 gm/dL  Auto Neutrophil # : x  Auto Lymphocyte # : x  Auto Monocyte # : x  Auto Eosinophil # : x  Auto Basophil # : x  Auto Neutrophil % : x  Auto Lymphocyte % : x  Auto Monocyte % : x  Auto Eosinophil % : x  Auto Basophil % : x    03-31    137  |  98  |  12  ----------------------------<  230<H>  3.7   |  28  |  0.62    Ca    7.8<L>      31 Mar 2018 07:40            CAPILLARY BLOOD GLUCOSE      POCT Blood Glucose.: 296 mg/dL (31 Mar 2018 12:29)  POCT Blood Glucose.: 293 mg/dL (31 Mar 2018 07:50)  POCT Blood Glucose.: 402 mg/dL (31 Mar 2018 07:48)  POCT Blood Glucose.: 228 mg/dL (30 Mar 2018 21:28)  POCT Blood Glucose.: 411 mg/dL (30 Mar 2018 18:43)  POCT Blood Glucose.: 211 mg/dL (30 Mar 2018 17:36)      RADIOLOGY & ADDITIONAL TESTS:      Consultant(s):    Care Discussed with Consultants/Other Providers [ ] YES  [ ] NO

## 2018-04-01 DIAGNOSIS — S82.201B UNSPECIFIED FRACTURE OF SHAFT OF RIGHT TIBIA, INITIAL ENCOUNTER FOR OPEN FRACTURE TYPE I OR II: ICD-10-CM

## 2018-04-01 LAB
GLUCOSE BLDC GLUCOMTR-MCNC: 196 MG/DL — HIGH (ref 70–99)
GLUCOSE BLDC GLUCOMTR-MCNC: 204 MG/DL — HIGH (ref 70–99)
GLUCOSE BLDC GLUCOMTR-MCNC: 205 MG/DL — HIGH (ref 70–99)
GLUCOSE BLDC GLUCOMTR-MCNC: 207 MG/DL — HIGH (ref 70–99)
GLUCOSE BLDC GLUCOMTR-MCNC: 216 MG/DL — HIGH (ref 70–99)
GLUCOSE BLDC GLUCOMTR-MCNC: 229 MG/DL — HIGH (ref 70–99)
GLUCOSE BLDC GLUCOMTR-MCNC: 289 MG/DL — HIGH (ref 70–99)
HCT VFR BLD CALC: 31.1 % — LOW (ref 39–50)
HGB BLD-MCNC: 10.5 G/DL — LOW (ref 13–17)
MCHC RBC-ENTMCNC: 31.1 PG — SIGNIFICANT CHANGE UP (ref 27–34)
MCHC RBC-ENTMCNC: 33.9 GM/DL — SIGNIFICANT CHANGE UP (ref 32–36)
MCV RBC AUTO: 91.8 FL — SIGNIFICANT CHANGE UP (ref 80–100)
PLATELET # BLD AUTO: 287 K/UL — SIGNIFICANT CHANGE UP (ref 150–400)
RBC # BLD: 3.38 M/UL — LOW (ref 4.2–5.8)
RBC # FLD: 11.9 % — SIGNIFICANT CHANGE UP (ref 10.3–14.5)
WBC # BLD: 9.2 K/UL — SIGNIFICANT CHANGE UP (ref 3.8–10.5)
WBC # FLD AUTO: 9.2 K/UL — SIGNIFICANT CHANGE UP (ref 3.8–10.5)

## 2018-04-01 RX ADMIN — INSULIN GLARGINE 40 UNIT(S): 100 INJECTION, SOLUTION SUBCUTANEOUS at 14:15

## 2018-04-01 RX ADMIN — Medication 1000 UNIT(S): at 12:25

## 2018-04-01 RX ADMIN — Medication 100 MILLIGRAM(S): at 22:24

## 2018-04-01 RX ADMIN — ATENOLOL 50 MILLIGRAM(S): 25 TABLET ORAL at 22:24

## 2018-04-01 RX ADMIN — Medication 12 UNIT(S): at 08:10

## 2018-04-01 RX ADMIN — Medication 150 MICROGRAM(S): at 05:37

## 2018-04-01 RX ADMIN — Medication 1 TABLET(S): at 12:25

## 2018-04-01 RX ADMIN — Medication 325 MILLIGRAM(S): at 17:41

## 2018-04-01 RX ADMIN — Medication 12 UNIT(S): at 12:23

## 2018-04-01 RX ADMIN — OXYCODONE HYDROCHLORIDE 10 MILLIGRAM(S): 5 TABLET ORAL at 06:08

## 2018-04-01 RX ADMIN — Medication 100 MILLIGRAM(S): at 05:37

## 2018-04-01 RX ADMIN — OXYCODONE HYDROCHLORIDE 5 MILLIGRAM(S): 5 TABLET ORAL at 23:10

## 2018-04-01 RX ADMIN — OXYCODONE HYDROCHLORIDE 10 MILLIGRAM(S): 5 TABLET ORAL at 05:38

## 2018-04-01 RX ADMIN — OXYCODONE HYDROCHLORIDE 5 MILLIGRAM(S): 5 TABLET ORAL at 22:40

## 2018-04-01 RX ADMIN — Medication 12 UNIT(S): at 17:40

## 2018-04-01 RX ADMIN — Medication 325 MILLIGRAM(S): at 08:11

## 2018-04-01 RX ADMIN — Medication 4: at 08:10

## 2018-04-01 RX ADMIN — Medication 325 MILLIGRAM(S): at 05:37

## 2018-04-01 RX ADMIN — SIMVASTATIN 20 MILLIGRAM(S): 20 TABLET, FILM COATED ORAL at 22:24

## 2018-04-01 RX ADMIN — Medication 325 MILLIGRAM(S): at 12:25

## 2018-04-01 RX ADMIN — Medication 6: at 12:24

## 2018-04-01 RX ADMIN — Medication 4: at 17:40

## 2018-04-01 NOTE — PROGRESS NOTE ADULT - ASSESSMENT
Patient is a 42y old  Male who presents with a chief complaint of Right Tib Fib Fracture (30 Mar 2018 12:51)    Post-op with tachycardia. CTA chest with no pulmonary embolism and RLL atelectasis. Diabetes better controlled by endocrinology.  Heart rate decreased on Atenolol with no respiratory distress. Otherwise doing well post-op with non weight bearing ambulation.

## 2018-04-01 NOTE — PROGRESS NOTE ADULT - PROBLEM SELECTOR PLAN 1
- Pain control  - DVT ppx  - NWB RLE/OOB  - Venodynelías, IS  - Continue current tx     Yosvany Noriega PA-C  Orthopedic Surgery  Pager: [phone]  Spectra: [phone]

## 2018-04-01 NOTE — PROGRESS NOTE ADULT - PROBLEM SELECTOR PLAN 3
- will need outpatient DEXA as patient experienced a non-traumatic fracture  - check albumin level as patient noted to have low calcium level  - start vitamin D 1000 units qd

## 2018-04-01 NOTE — PROGRESS NOTE ADULT - PROBLEM SELECTOR PLAN 1
- patient hypoglycemia at bedtime last night after eating few carbs at dinner yesterday  - at this time, minimal pattern to blood glucose levels  - will monitor on Lantus 40 units daily and Humalog to 14 units before meals  - c/w moderate correction before meals and at bedtime   - consistent carb diet  - will follow  - plan to discharge on basal bolus insulin, doses to be determined.   - patient to follow up in endocrine clinic on discharge - [phone]

## 2018-04-01 NOTE — PROGRESS NOTE ADULT - SUBJECTIVE AND OBJECTIVE BOX
Patient is a 42y old  Male who presents with a chief complaint of Right Tib Fib Fracture (30 Mar 2018 12:51)    Pt. resting without complaint    Vital Signs Last 24 Hrs  T(C): 36.7 (01 Apr 2018 05:22), Max: 37 (01 Apr 2018 00:14)  T(F): 98 (01 Apr 2018 05:22), Max: 98.6 (01 Apr 2018 00:14)  HR: 82 (01 Apr 2018 05:22) (80 - 95)  BP: 127/82 (01 Apr 2018 05:22) (115/75 - 132/75)  BP(mean): --  RR: 18 (01 Apr 2018 05:22) (17 - 18)  SpO2: 93% (01 Apr 2018 05:22) (93% - 98%)    M.S.  A&O  Extremity-Right LE- dressings C/D/I, prox d/c'd wounds C/D/I                                                      distal- dsg C/D/I  Neuro-              Motor- (+)ankle DF/PF              Sensation- grossly to light touch              Calves- soft, nontender- PAS                               10.0   9.76  )-----------( 238      ( 30 Mar 2018 07:44 )             30.1     03-30    135  |  98  |  16  ----------------------------<  294<H>  4.3   |  27  |  0.70    Ca    8.2<L>      30 Mar 2018 06:47

## 2018-04-01 NOTE — PROGRESS NOTE ADULT - SUBJECTIVE AND OBJECTIVE BOX
Chief Complaint: f/u DM1    History:  Patient with hypoglycemia at bedtime last night. At dinner yesterday, he had a fish sandwich, salad, and asparagus. States he ate few carbs.   No abdominal pain, nausea, vomiting, diarrhea.    MEDICATIONS  (STANDING):  aspirin enteric coated 325 milliGRAM(s) Oral two times a day  ATENolol  Tablet 50 milliGRAM(s) Oral at bedtime  cholecalciferol 1000 Unit(s) Oral daily  dextrose 5%. 1000 milliLiter(s) (50 mL/Hr) IV Continuous <Continuous>  dextrose 50% Injectable 12.5 Gram(s) IV Push once  dextrose 50% Injectable 25 Gram(s) IV Push once  dextrose 50% Injectable 25 Gram(s) IV Push once  ferrous    sulfate 325 milliGRAM(s) Oral three times a day with meals  insulin glargine Injectable (LANTUS) 40 Unit(s) SubCutaneous <User Schedule>  insulin lispro (HumaLOG) corrective regimen sliding scale   SubCutaneous three times a day before meals  insulin lispro (HumaLOG) corrective regimen sliding scale   SubCutaneous at bedtime  insulin lispro Injectable (HumaLOG) 12 Unit(s) SubCutaneous three times a day with meals  levothyroxine 150 MICROGram(s) Oral daily  multivitamin 1 Tablet(s) Oral daily  simvastatin 20 milliGRAM(s) Oral at bedtime  sodium chloride 0.9%. 1000 milliLiter(s) (100 mL/Hr) IV Continuous <Continuous>    MEDICATIONS  (PRN):  acetaminophen   Tablet 650 milliGRAM(s) Oral every 6 hours PRN For Temp greater than 38 C (100.4 F)  aluminum hydroxide/magnesium hydroxide/simethicone Suspension 30 milliLiter(s) Oral four times a day PRN Indigestion  dextrose Gel 1 Dose(s) Oral once PRN Blood Glucose LESS THAN 70 milliGRAM(s)/deciliter  docusate sodium 100 milliGRAM(s) Oral three times a day PRN Constipation  glucagon  Injectable 1 milliGRAM(s) IntraMuscular once PRN Glucose LESS THAN 70 milligrams/deciliter  morphine  - Injectable 4 milliGRAM(s) IV Push every 4 hours PRN Severe Pain  ondansetron Injectable 4 milliGRAM(s) IV Push once PRN Nausea and/or Vomiting  ondansetron Injectable 4 milliGRAM(s) IV Push every 6 hours PRN Nausea and/or Vomiting  oxyCODONE    IR 10 milliGRAM(s) Oral every 4 hours PRN Moderate Pain  oxyCODONE    IR 5 milliGRAM(s) Oral every 4 hours PRN Mild Pain      Allergies  No Known Allergies    Review of Systems:  Constitutional: No fever  Cardiovascular: No chest pain, palpitations  Respiratory: No SOB, no cough  GI: No nausea, vomiting, abdominal pain    ALL OTHER SYSTEMS REVIEWED AND NEGATIVE    PHYSICAL EXAM:  VITALS: T(C): 36.8 (04-01-18 @ 09:26)  T(F): 98.2 (04-01-18 @ 09:26), Max: 98.6 (04-01-18 @ 00:14)  HR: 92 (04-01-18 @ 13:00) (82 - 95)  BP: 147/84 (04-01-18 @ 13:00) (112/72 - 147/84)  RR:  (17 - 18)  SpO2:  (93% - 98%)  Wt(kg): --  GENERAL: NAD, well-developed  RESPIRATORY: Clear to auscultation bilaterally; No rales, rhonchi, wheezing, or rubs  CARDIOVASCULAR: Regular rate and rhythm; No murmurs  GI: Soft, nontender, non distended  PSYCH: Alert and oriented x 3, reactive affect    POCT Blood Glucose.: 229 mg/dL (04-01-18 @ 12:56)  POCT Blood Glucose.: 289 mg/dL (04-01-18 @ 12:12)  POCT Blood Glucose.: 204 mg/dL (04-01-18 @ 08:01)  POCT Blood Glucose.: 196 mg/dL (04-01-18 @ 02:04)  POCT Blood Glucose.: 147 mg/dL (03-31-18 @ 22:46)  POCT Blood Glucose.: 84 mg/dL (03-31-18 @ 22:14)  POCT Blood Glucose.: 74 mg/dL (03-31-18 @ 21:52)  POCT Blood Glucose.: 51 mg/dL (03-31-18 @ 21:28)  POCT Blood Glucose.: 164 mg/dL (03-31-18 @ 17:23)  POCT Blood Glucose.: 296 mg/dL (03-31-18 @ 12:29)  POCT Blood Glucose.: 293 mg/dL (03-31-18 @ 07:50)  POCT Blood Glucose.: 402 mg/dL (03-31-18 @ 07:48)  POCT Blood Glucose.: 228 mg/dL (03-30-18 @ 21:28)  POCT Blood Glucose.: 411 mg/dL (03-30-18 @ 18:43)  POCT Blood Glucose.: 211 mg/dL (03-30-18 @ 17:36)  POCT Blood Glucose.: 235 mg/dL (03-30-18 @ 16:28)  POCT Blood Glucose.: 231 mg/dL (03-30-18 @ 14:16)  POCT Blood Glucose.: 422 mg/dL (03-30-18 @ 12:00)  POCT Blood Glucose.: 346 mg/dL (03-30-18 @ 09:09)  POCT Blood Glucose.: 321 mg/dL (03-30-18 @ 07:51)  POCT Blood Glucose.: 318 mg/dL (03-29-18 @ 21:21)  POCT Blood Glucose.: 388 mg/dL (03-29-18 @ 17:49)  POCT Blood Glucose.: >600 mg/dL (03-29-18 @ 17:47)      03-31    137  |  98  |  12  ----------------------------<  230<H>  3.7   |  28  |  0.62    EGFR if : 143  EGFR if non : 123    Ca    7.8<L>      03-31            Thyroid Function Tests:  03-31 @ 10:38 TSH 0.42 FreeT4 -- T3 -- Anti TPO -- Anti Thyroglobulin Ab -- TSI --  03-30 @ 19:10 TSH 0.27 FreeT4 -- T3 83 Anti TPO -- Anti Thyroglobulin Ab -- TSI --      Hemoglobin A1C, Whole Blood: 8.0 % <H> [4.0 - 5.6] (03-29-18 @ 07:41)

## 2018-04-01 NOTE — PROGRESS NOTE ADULT - ATTENDING COMMENTS
No medical complications post-op to date and to proceed with physical therapy, as tolerated. Continues pulmonary toilet to lessen atelectasis, leg exercises as taught to lessen the risk of DVT and supervised pain medications for post-op pain control. tachycardia controlled with control of diabetes and Atenolol.  To proceed with discharge planning when cleared by orthopedics.

## 2018-04-01 NOTE — PROGRESS NOTE ADULT - ASSESSMENT
Patient is a 42y old Male who presents with a chief complaint of Right Tib Fib Fracture (30 Mar 2018 12:51) s/p Right tibia/fibula Incision and drainage and intramedullary nailing.  Pt stable.

## 2018-04-01 NOTE — PROGRESS NOTE ADULT - SUBJECTIVE AND OBJECTIVE BOX
Patient is a 42y old  Male who presents with a chief complaint of Right Tib Fib Fracture (30 Mar 2018 12:51)    Post-op with tachycardia. CTA chest with no pulmonary embolism and RLL atelectasis. Diabetes better controlled by endocrinology.  Heart rate decreased on Atenolol with no respiratory distress. The  patient complains of right leg pain post-op and is otherwise  feeling well    MEDICATIONS  (STANDING):  aspirin enteric coated 325 milliGRAM(s) Oral two times a day  ATENolol  Tablet 50 milliGRAM(s) Oral at bedtime  cholecalciferol 1000 Unit(s) Oral daily  dextrose 5%. 1000 milliLiter(s) (50 mL/Hr) IV Continuous <Continuous>  dextrose 50% Injectable 12.5 Gram(s) IV Push once  dextrose 50% Injectable 25 Gram(s) IV Push once  dextrose 50% Injectable 25 Gram(s) IV Push once  ferrous    sulfate 325 milliGRAM(s) Oral three times a day with meals  insulin glargine Injectable (LANTUS) 40 Unit(s) SubCutaneous <User Schedule>  insulin lispro (HumaLOG) corrective regimen sliding scale   SubCutaneous three times a day before meals  insulin lispro (HumaLOG) corrective regimen sliding scale   SubCutaneous at bedtime  insulin lispro Injectable (HumaLOG) 12 Unit(s) SubCutaneous three times a day with meals  levothyroxine 150 MICROGram(s) Oral daily  multivitamin 1 Tablet(s) Oral daily  simvastatin 20 milliGRAM(s) Oral at bedtime  sodium chloride 0.9%. 1000 milliLiter(s) (100 mL/Hr) IV Continuous <Continuous>    MEDICATIONS  (PRN):  acetaminophen   Tablet 650 milliGRAM(s) Oral every 6 hours PRN For Temp greater than 38 C (100.4 F)  aluminum hydroxide/magnesium hydroxide/simethicone Suspension 30 milliLiter(s) Oral four times a day PRN Indigestion  dextrose Gel 1 Dose(s) Oral once PRN Blood Glucose LESS THAN 70 milliGRAM(s)/deciliter  docusate sodium 100 milliGRAM(s) Oral three times a day PRN Constipation  glucagon  Injectable 1 milliGRAM(s) IntraMuscular once PRN Glucose LESS THAN 70 milligrams/deciliter  morphine  - Injectable 4 milliGRAM(s) IV Push every 4 hours PRN Severe Pain  ondansetron Injectable 4 milliGRAM(s) IV Push once PRN Nausea and/or Vomiting  ondansetron Injectable 4 milliGRAM(s) IV Push every 6 hours PRN Nausea and/or Vomiting  oxyCODONE    IR 10 milliGRAM(s) Oral every 4 hours PRN Moderate Pain  oxyCODONE    IR 5 milliGRAM(s) Oral every 4 hours PRN Mild Pain            Allergies    No Known Allergies    Vital Signs Last 24 Hrs  T(C): 36.8 (01 Apr 2018 09:26), Max: 37 (01 Apr 2018 00:14)  T(F): 98.2 (01 Apr 2018 09:26), Max: 98.6 (01 Apr 2018 00:14)  HR: 92 (01 Apr 2018 13:00) (82 - 95)  BP: 147/84 (01 Apr 2018 13:00) (112/72 - 147/84)  BP(mean): --  RR: 18 (01 Apr 2018 09:26) (17 - 18)  SpO2: 95% (01 Apr 2018 09:26) (93% - 98%)    PHYSICAL EXAM:  GENERAL: NAD, well nourished and conversant  HEAD:  Atraumatic  EYES: EOM, PERRLA, conjunctiva pink and sclera white  ENT: No tonsillar erythema, exudates, or enlargement, moist mucous membranes, good dentition, no lesions  NECK: Supple, No JVD, normal thyroid, carotids with normal upstrokes and no bruits  CHEST/LUNG: Clear to auscultation bilaterally, No rales, rhonchi, wheezing, or rubs  HEART: Regular rate and rhythm, No murmurs, rubs, or gallops  ABDOMEN: Soft, nondistended, no masses, guarding, tenderness or rebound, bowel sounds present  EXTREMITIES:  Right lower extremity with 4+ peripheral edema.  LYMPH: No lymphadenopathy noted  SKIN: No rashes or lesions  NERVOUS SYSTEM:  Alert & Oriented X3, normal cognitive function. Motor Strength 5/5 right upper and right lower.  5/5 left upper and left lower extremities, DTRs 2+ intact and symmetric    LABS:                03-31    137  |  98  |  12  ----------------------------<  230<H>  3.7   |  28  |  0.62    Ca    7.8<L>      31 Mar 2018 07:40                POCT Blood Glucose.: 296 mg/dL (31 Mar 2018 12:29)  POCT Blood Glucose.: 293 mg/dL (31 Mar 2018 07:50)  POCT Blood Glucose.: 402 mg/dL (31 Mar 2018 07:48)  POCT Blood Glucose.: 228 mg/dL (30 Mar 2018 21:28)  POCT Blood Glucose.: 411 mg/dL (30 Mar 2018 18:43)  POCT Blood Glucose.: 211 mg/dL (30 Mar 2018 17:36)      RADIOLOGY & ADDITIONAL TESTS:      Consultant(s):    Care Discussed with Consultants/Other Providers [ ] YES  [ X] NO

## 2018-04-02 LAB
ANION GAP SERPL CALC-SCNC: 11 MMOL/L — SIGNIFICANT CHANGE UP (ref 5–17)
BUN SERPL-MCNC: 17 MG/DL — SIGNIFICANT CHANGE UP (ref 7–23)
CALCIUM SERPL-MCNC: 8.6 MG/DL — SIGNIFICANT CHANGE UP (ref 8.4–10.5)
CHLORIDE SERPL-SCNC: 100 MMOL/L — SIGNIFICANT CHANGE UP (ref 96–108)
CO2 SERPL-SCNC: 28 MMOL/L — SIGNIFICANT CHANGE UP (ref 22–31)
CREAT SERPL-MCNC: 0.73 MG/DL — SIGNIFICANT CHANGE UP (ref 0.5–1.3)
GLUCOSE BLDC GLUCOMTR-MCNC: 168 MG/DL — HIGH (ref 70–99)
GLUCOSE BLDC GLUCOMTR-MCNC: 206 MG/DL — HIGH (ref 70–99)
GLUCOSE BLDC GLUCOMTR-MCNC: 226 MG/DL — HIGH (ref 70–99)
GLUCOSE BLDC GLUCOMTR-MCNC: 89 MG/DL — SIGNIFICANT CHANGE UP (ref 70–99)
GLUCOSE BLDC GLUCOMTR-MCNC: 97 MG/DL — SIGNIFICANT CHANGE UP (ref 70–99)
GLUCOSE SERPL-MCNC: 200 MG/DL — HIGH (ref 70–99)
HCT VFR BLD CALC: 28.9 % — LOW (ref 39–50)
HGB BLD-MCNC: 9.7 G/DL — LOW (ref 13–17)
MCHC RBC-ENTMCNC: 29.9 PG — SIGNIFICANT CHANGE UP (ref 27–34)
MCHC RBC-ENTMCNC: 33.6 GM/DL — SIGNIFICANT CHANGE UP (ref 32–36)
MCV RBC AUTO: 89.2 FL — SIGNIFICANT CHANGE UP (ref 80–100)
NRBC # BLD: 0 /100 WBCS — SIGNIFICANT CHANGE UP (ref 0–0)
PLATELET # BLD AUTO: 289 K/UL — SIGNIFICANT CHANGE UP (ref 150–400)
POTASSIUM SERPL-MCNC: 4.3 MMOL/L — SIGNIFICANT CHANGE UP (ref 3.5–5.3)
POTASSIUM SERPL-SCNC: 4.3 MMOL/L — SIGNIFICANT CHANGE UP (ref 3.5–5.3)
RBC # BLD: 3.24 M/UL — LOW (ref 4.2–5.8)
RBC # FLD: 13.7 % — SIGNIFICANT CHANGE UP (ref 10.3–14.5)
SODIUM SERPL-SCNC: 139 MMOL/L — SIGNIFICANT CHANGE UP (ref 135–145)
WBC # BLD: 7.87 K/UL — SIGNIFICANT CHANGE UP (ref 3.8–10.5)
WBC # FLD AUTO: 7.87 K/UL — SIGNIFICANT CHANGE UP (ref 3.8–10.5)

## 2018-04-02 PROCEDURE — 99232 SBSQ HOSP IP/OBS MODERATE 35: CPT

## 2018-04-02 RX ORDER — INSULIN GLARGINE 100 [IU]/ML
44 INJECTION, SOLUTION SUBCUTANEOUS
Qty: 0 | Refills: 0 | Status: DISCONTINUED | OUTPATIENT
Start: 2018-04-02 | End: 2018-04-03

## 2018-04-02 RX ORDER — INSULIN LISPRO 100/ML
VIAL (ML) SUBCUTANEOUS AT BEDTIME
Qty: 0 | Refills: 0 | Status: DISCONTINUED | OUTPATIENT
Start: 2018-04-02 | End: 2018-04-03

## 2018-04-02 RX ORDER — INSULIN LISPRO 100/ML
VIAL (ML) SUBCUTANEOUS
Qty: 0 | Refills: 0 | Status: DISCONTINUED | OUTPATIENT
Start: 2018-04-02 | End: 2018-04-03

## 2018-04-02 RX ORDER — INSULIN LISPRO 100/ML
13 VIAL (ML) SUBCUTANEOUS
Qty: 0 | Refills: 0 | Status: DISCONTINUED | OUTPATIENT
Start: 2018-04-02 | End: 2018-04-03

## 2018-04-02 RX ADMIN — ATENOLOL 50 MILLIGRAM(S): 25 TABLET ORAL at 21:42

## 2018-04-02 RX ADMIN — Medication 4: at 08:29

## 2018-04-02 RX ADMIN — INSULIN GLARGINE 44 UNIT(S): 100 INJECTION, SOLUTION SUBCUTANEOUS at 14:27

## 2018-04-02 RX ADMIN — Medication 1000 UNIT(S): at 12:23

## 2018-04-02 RX ADMIN — Medication 325 MILLIGRAM(S): at 06:13

## 2018-04-02 RX ADMIN — Medication 325 MILLIGRAM(S): at 08:44

## 2018-04-02 RX ADMIN — OXYCODONE HYDROCHLORIDE 5 MILLIGRAM(S): 5 TABLET ORAL at 06:13

## 2018-04-02 RX ADMIN — SIMVASTATIN 20 MILLIGRAM(S): 20 TABLET, FILM COATED ORAL at 21:42

## 2018-04-02 RX ADMIN — Medication 1 TABLET(S): at 12:26

## 2018-04-02 RX ADMIN — Medication 1: at 18:07

## 2018-04-02 RX ADMIN — Medication 325 MILLIGRAM(S): at 18:08

## 2018-04-02 RX ADMIN — OXYCODONE HYDROCHLORIDE 5 MILLIGRAM(S): 5 TABLET ORAL at 16:53

## 2018-04-02 RX ADMIN — Medication 100 MILLIGRAM(S): at 06:13

## 2018-04-02 RX ADMIN — Medication 4: at 12:26

## 2018-04-02 RX ADMIN — Medication 150 MICROGRAM(S): at 06:13

## 2018-04-02 RX ADMIN — OXYCODONE HYDROCHLORIDE 5 MILLIGRAM(S): 5 TABLET ORAL at 16:34

## 2018-04-02 RX ADMIN — OXYCODONE HYDROCHLORIDE 5 MILLIGRAM(S): 5 TABLET ORAL at 06:43

## 2018-04-02 RX ADMIN — Medication 12 UNIT(S): at 08:29

## 2018-04-02 RX ADMIN — Medication 325 MILLIGRAM(S): at 12:23

## 2018-04-02 RX ADMIN — Medication 13 UNIT(S): at 18:07

## 2018-04-02 RX ADMIN — Medication 13 UNIT(S): at 12:20

## 2018-04-02 NOTE — PROGRESS NOTE ADULT - PROBLEM SELECTOR PLAN 3
will need outpatient DEXA as patient experienced a non-traumatic fracture  - check albumin level as patient noted to have low calcium level  - start vitamin D 1000 units qd.

## 2018-04-02 NOTE — PROGRESS NOTE ADULT - ASSESSMENT
Patient is a 42y old  Male who presents with a chief complaint of Right Tib Fib Fracture Post-op with tachycardia. CTA chest with no pulmonary embolism and RLL atelectasis. Diabetes better controlled by endocrinology. Pain better controlled. HR better controlled

## 2018-04-02 NOTE — PROGRESS NOTE ADULT - SUBJECTIVE AND OBJECTIVE BOX
Patient is a 42y old  Male who presents with a chief complaint of Right Tib Fib Fracture Post-op with tachycardia. CTA chest with no pulmonary embolism and RLL atelectasis. Diabetes better controlled by endocrinology. Heart rate decreased on Atenolol with no respiratory distress. The  patient complains of right leg pain post-op and is otherwise  feeling well    MEDICATIONS  (STANDING):  aspirin enteric coated 325 milliGRAM(s) Oral two times a day  ATENolol  Tablet 50 milliGRAM(s) Oral at bedtime  cholecalciferol 1000 Unit(s) Oral daily  dextrose 5%. 1000 milliLiter(s) (50 mL/Hr) IV Continuous <Continuous>  dextrose 50% Injectable 12.5 Gram(s) IV Push once  dextrose 50% Injectable 25 Gram(s) IV Push once  dextrose 50% Injectable 25 Gram(s) IV Push once  ferrous    sulfate 325 milliGRAM(s) Oral three times a day with meals  insulin glargine Injectable (LANTUS) 44 Unit(s) SubCutaneous <User Schedule>  insulin lispro (HumaLOG) corrective regimen sliding scale   SubCutaneous three times a day before meals  insulin lispro (HumaLOG) corrective regimen sliding scale   SubCutaneous at bedtime  insulin lispro Injectable (HumaLOG) 13 Unit(s) SubCutaneous three times a day with meals  levothyroxine 150 MICROGram(s) Oral daily  multivitamin 1 Tablet(s) Oral daily  simvastatin 20 milliGRAM(s) Oral at bedtime  sodium chloride 0.9%. 1000 milliLiter(s) (100 mL/Hr) IV Continuous <Continuous>    MEDICATIONS  (PRN):  acetaminophen   Tablet 650 milliGRAM(s) Oral every 6 hours PRN For Temp greater than 38 C (100.4 F)  aluminum hydroxide/magnesium hydroxide/simethicone Suspension 30 milliLiter(s) Oral four times a day PRN Indigestion  dextrose Gel 1 Dose(s) Oral once PRN Blood Glucose LESS THAN 70 milliGRAM(s)/deciliter  docusate sodium 100 milliGRAM(s) Oral three times a day PRN Constipation  glucagon  Injectable 1 milliGRAM(s) IntraMuscular once PRN Glucose LESS THAN 70 milligrams/deciliter  morphine  - Injectable 4 milliGRAM(s) IV Push every 4 hours PRN Severe Pain  ondansetron Injectable 4 milliGRAM(s) IV Push once PRN Nausea and/or Vomiting  ondansetron Injectable 4 milliGRAM(s) IV Push every 6 hours PRN Nausea and/or Vomiting  oxyCODONE    IR 10 milliGRAM(s) Oral every 4 hours PRN Moderate Pain  oxyCODONE    IR 5 milliGRAM(s) Oral every 4 hours PRN Mild Pain          VITALS:   T(C): 36.6 (04-02-18 @ 20:33), Max: 36.9 (04-02-18 @ 00:52)  HR: 95 (04-02-18 @ 20:33) (78 - 100)  BP: 128/65 (04-02-18 @ 20:33) (111/67 - 131/75)  RR: 18 (04-02-18 @ 20:33) (18 - 18)  SpO2: 93% (04-02-18 @ 20:33) (92% - 95%)  Wt(kg): --    PHYSICAL EXAM:  GENERAL: NAD, well nourished and conversant  HEAD:  Atraumatic  EYES: EOM, PERRLA, conjunctiva pink and sclera white  ENT: No tonsillar erythema, exudates, or enlargement, moist mucous membranes, good dentition, no lesions  NECK: Supple, No JVD, normal thyroid, carotids with normal upstrokes and no bruits  CHEST/LUNG: Clear to auscultation bilaterally, No rales, rhonchi, wheezing, or rubs  HEART: Regular rate and rhythm, No murmurs, rubs, or gallops  ABDOMEN: Soft, nondistended, no masses, guarding, tenderness or rebound, bowel sounds present  EXTREMITIES:  Right lower extremity with 4+ peripheral edema.  LYMPH: No lymphadenopathy noted  SKIN: No rashes or lesions  NERVOUS SYSTEM:  Alert & Oriented X3, normal cognitive function. Motor Strength 5/5 right upper and right lower.  5/5 left upper and left lower extremities, DTRs 2+ intact and symmetric    LABS:        CBC Full  -  ( 02 Apr 2018 07:52 )  WBC Count : 7.87 K/uL  Hemoglobin : 9.7 g/dL  Hematocrit : 28.9 %  Platelet Count - Automated : 289 K/uL  Mean Cell Volume : 89.2 fl  Mean Cell Hemoglobin : 29.9 pg  Mean Cell Hemoglobin Concentration : 33.6 gm/dL  Auto Neutrophil # : x  Auto Lymphocyte # : x  Auto Monocyte # : x  Auto Eosinophil # : x  Auto Basophil # : x  Auto Neutrophil % : x  Auto Lymphocyte % : x  Auto Monocyte % : x  Auto Eosinophil % : x  Auto Basophil % : x    04-02    139  |  100  |  17  ----------------------------<  200<H>  4.3   |  28  |  0.73    Ca    8.6      02 Apr 2018 06:21            CAPILLARY BLOOD GLUCOSE      POCT Blood Glucose.: 89 mg/dL (02 Apr 2018 21:56)  POCT Blood Glucose.: 168 mg/dL (02 Apr 2018 17:56)  POCT Blood Glucose.: 97 mg/dL (02 Apr 2018 15:29)  POCT Blood Glucose.: 206 mg/dL (02 Apr 2018 12:16)  POCT Blood Glucose.: 226 mg/dL (02 Apr 2018 07:51)      RADIOLOGY & ADDITIONAL TESTS:

## 2018-04-02 NOTE — PROGRESS NOTE ADULT - ASSESSMENT
42 M R grade 1 open tib fib s/p IMN and I&D #5    - NWB RLE  - FU clinic outpatient and DEXA scan as outpatient  - DVT prophylaxis  - Monitor glucose, appreciate med recs  - Dispo planning        John Bruce PGY4

## 2018-04-02 NOTE — PROGRESS NOTE ADULT - PROBLEM SELECTOR PLAN 1
-test BG AC/HS/2AM daily  -Increase Lantus 44 units Daily (give at 2pm daily)  -Increase humalog 13 units w/meals  -c/w Humalog moderate correction scale AC/HS  -discharge on basal/bolus-will determine doses at time of discharge  - patient to follow up in endocrine clinic on discharge - [phone].  -discussed w/pt and team  pager: [phone]

## 2018-04-02 NOTE — PROGRESS NOTE ADULT - SUBJECTIVE AND OBJECTIVE BOX
Pt. resting without complaint. Much happier this morning and able to move his leg  Denies headache, chest pain or shortness of breath.    Vital Signs Last 24 Hrs  T(C): 36.6 (02 Apr 2018 05:42), Max: 37.2 (01 Apr 2018 20:45)  T(F): 97.9 (02 Apr 2018 05:42), Max: 99 (01 Apr 2018 20:45)  HR: 78 (02 Apr 2018 05:42) (78 - 102)  BP: 111/67 (02 Apr 2018 05:42) (111/67 - 147/84)  BP(mean): --  RR: 18 (02 Apr 2018 05:42) (18 - 18)  SpO2: 92% (02 Apr 2018 05:42) (92% - 95%)                          10.5   9.2   )-----------( 287      ( 01 Apr 2018 14:04 )             31.1       AOx3  RLE Incision clean dry intact  Compartment of lower extremity soft    Neuro-              Motor- (+)ankle DF/PF              Sensation- grossly to light touch              Calves- soft, nontender- PAS  Pulses 2+ DP

## 2018-04-02 NOTE — PROGRESS NOTE ADULT - ASSESSMENT
43 y/o M with uncontrolled DM1 with no known microvascular complications, hypothyroidism, presents with non-traumatic right tibial fracture s/p ORIF w/persistent post-op hyperglycemia. BG goal (100-180mg/dl). BG values persistently elevated on present insulin regimen. Suspect immobility and surgical stress response contributing to persistent hyperglycemia. Will increase basal/bolus to improve glycemic control.

## 2018-04-02 NOTE — PROGRESS NOTE ADULT - SUBJECTIVE AND OBJECTIVE BOX
Diabetes Follow up note:  Interval Hx:  42 year old male w/uncontrolled T1DM w/out complications here w/R tibia/fibial Fx s/p ORIF. Pt requiring higher doses of insulin from baseline since surgery. Pt seen at bedside. Endorses good appetite-eating food provided by hospital. In good spirits and reports feeling less pain and having more mobility.     Review of Systems:  General: No complaints.   GI: Tolerating POs without any N/V/D/ABD PAIN.  CV: No CP/SOB  ENDO: No S&Sx of hypoglycemia  MEDS:    insulin glargine Injectable (LANTUS) 40 Unit(s) SubCutaneous <User Schedule>  insulin lispro (HumaLOG) corrective regimen sliding scale   SubCutaneous three times a day before meals  insulin lispro (HumaLOG) corrective regimen sliding scale   SubCutaneous at bedtime  insulin lispro Injectable (HumaLOG) 12 Unit(s) SubCutaneous three times a day with meals  levothyroxine 150 MICROGram(s) Oral daily  simvastatin 20 milliGRAM(s) Oral at bedtime      Allergies    No Known Allergies        PE:  General: Male lying in bed. NAD.   Vital Signs Last 24 Hrs  T(C): 36.6 (02 Apr 2018 05:42), Max: 37.2 (01 Apr 2018 20:45)  T(F): 97.9 (02 Apr 2018 05:42), Max: 99 (01 Apr 2018 20:45)  HR: 78 (02 Apr 2018 05:42) (78 - 102)  BP: 111/67 (02 Apr 2018 05:42) (111/67 - 147/84)  BP(mean): --  RR: 18 (02 Apr 2018 05:42) (18 - 18)  SpO2: 92% (02 Apr 2018 05:42) (92% - 95%)  CV: S1, S2. No murmurs.   Abd: Soft, NT,ND,   Resp: CTA b/L. No accessory muscle use.   Neuro: A&O X3    LABS:    POCT Blood Glucose.: 226 mg/dL (04-02-18 @ 07:51)  POCT Blood Glucose.: 216 mg/dL (04-01-18 @ 21:21)  POCT Blood Glucose.: 207 mg/dL (04-01-18 @ 17:25)  POCT Blood Glucose.: 205 mg/dL (04-01-18 @ 14:14)  POCT Blood Glucose.: 229 mg/dL (04-01-18 @ 12:56)  POCT Blood Glucose.: 289 mg/dL (04-01-18 @ 12:12)  POCT Blood Glucose.: 204 mg/dL (04-01-18 @ 08:01)  POCT Blood Glucose.: 196 mg/dL (04-01-18 @ 02:04)  POCT Blood Glucose.: 147 mg/dL (03-31-18 @ 22:46)  POCT Blood Glucose.: 84 mg/dL (03-31-18 @ 22:14)  POCT Blood Glucose.: 74 mg/dL (03-31-18 @ 21:52)  POCT Blood Glucose.: 51 mg/dL (03-31-18 @ 21:28)  POCT Blood Glucose.: 164 mg/dL (03-31-18 @ 17:23)  POCT Blood Glucose.: 296 mg/dL (03-31-18 @ 12:29)  POCT Blood Glucose.: 293 mg/dL (03-31-18 @ 07:50)  POCT Blood Glucose.: 402 mg/dL (03-31-18 @ 07:48)  POCT Blood Glucose.: 228 mg/dL (03-30-18 @ 21:28)  POCT Blood Glucose.: 411 mg/dL (03-30-18 @ 18:43)  POCT Blood Glucose.: 211 mg/dL (03-30-18 @ 17:36)  POCT Blood Glucose.: 235 mg/dL (03-30-18 @ 16:28)  POCT Blood Glucose.: 231 mg/dL (03-30-18 @ 14:16)  POCT Blood Glucose.: 422 mg/dL (03-30-18 @ 12:00)                            9.7    7.87  )-----------( 289      ( 02 Apr 2018 07:52 )             28.9       04-02    139  |  100  |  17  ----------------------------<  200<H>  4.3   |  28  |  0.73    Ca    8.6      02 Apr 2018 06:21        Thyroid Function Tests:  03-31 @ 10:38 TSH 0.42 FreeT4 -- T3 -- Anti TPO -- Anti Thyroglobulin Ab -- TSI --  03-30 @ 19:10 TSH 0.27 FreeT4 -- T3 83 Anti TPO -- Anti Thyroglobulin Ab -- TSI --      Hemoglobin A1C, Whole Blood: 8.0 % <H> [4.0 - 5.6] (03-29-18 @ 07:41)            Contact number: abdon [phone] or [phone]

## 2018-04-02 NOTE — PROGRESS NOTE ADULT - PROBLEM SELECTOR PLAN 1
tolerated procedure well  continue pain meds as needed  DVT and GI prophylaxis  PO as tolerated  DC planning

## 2018-04-03 VITALS — SYSTOLIC BLOOD PRESSURE: 128 MMHG | DIASTOLIC BLOOD PRESSURE: 76 MMHG | HEART RATE: 98 BPM

## 2018-04-03 LAB
GLUCOSE BLDC GLUCOMTR-MCNC: 214 MG/DL — HIGH (ref 70–99)
GLUCOSE BLDC GLUCOMTR-MCNC: 217 MG/DL — HIGH (ref 70–99)
GLUCOSE BLDC GLUCOMTR-MCNC: 234 MG/DL — HIGH (ref 70–99)

## 2018-04-03 PROCEDURE — 99223 1ST HOSP IP/OBS HIGH 75: CPT

## 2018-04-03 RX ORDER — DOCUSATE SODIUM 100 MG
100 CAPSULE ORAL THREE TIMES A DAY
Qty: 0 | Refills: 0 | Status: DISCONTINUED | OUTPATIENT
Start: 2018-04-03 | End: 2018-04-03

## 2018-04-03 RX ORDER — INSULIN GLARGINE 100 [IU]/ML
31 INJECTION, SOLUTION SUBCUTANEOUS
Qty: 0 | Refills: 0 | DISCHARGE
Start: 2018-04-03

## 2018-04-03 RX ORDER — POLYETHYLENE GLYCOL 3350 17 G/17G
17 POWDER, FOR SOLUTION ORAL DAILY
Qty: 0 | Refills: 0 | Status: DISCONTINUED | OUTPATIENT
Start: 2018-04-03 | End: 2018-04-03

## 2018-04-03 RX ORDER — ATENOLOL 25 MG/1
1 TABLET ORAL
Qty: 0 | Refills: 0 | DISCHARGE
Start: 2018-04-03

## 2018-04-03 RX ORDER — OXYCODONE HYDROCHLORIDE 5 MG/1
1 TABLET ORAL
Qty: 60 | Refills: 0
Start: 2018-04-03

## 2018-04-03 RX ORDER — CHOLECALCIFEROL (VITAMIN D3) 125 MCG
1000 CAPSULE ORAL
Qty: 0 | Refills: 0 | DISCHARGE
Start: 2018-04-03

## 2018-04-03 RX ORDER — ASPIRIN/CALCIUM CARB/MAGNESIUM 324 MG
1 TABLET ORAL
Qty: 0 | Refills: 0 | COMMUNITY
Start: 2018-04-03

## 2018-04-03 RX ORDER — DOCUSATE SODIUM 100 MG
1 CAPSULE ORAL
Qty: 0 | Refills: 0 | DISCHARGE
Start: 2018-04-03

## 2018-04-03 RX ORDER — INSULIN GLARGINE 100 [IU]/ML
40 INJECTION, SOLUTION SUBCUTANEOUS AT BEDTIME
Qty: 0 | Refills: 0 | Status: DISCONTINUED | OUTPATIENT
Start: 2018-04-03 | End: 2018-04-03

## 2018-04-03 RX ORDER — LEVOTHYROXINE SODIUM 125 MCG
1 TABLET ORAL
Qty: 0 | Refills: 0 | DISCHARGE
Start: 2018-04-03

## 2018-04-03 RX ORDER — ASPIRIN/CALCIUM CARB/MAGNESIUM 324 MG
1 TABLET ORAL
Qty: 84 | Refills: 0
Start: 2018-04-03 | End: 2018-05-14

## 2018-04-03 RX ORDER — HUMAN INSULIN 100 [IU]/ML
20 INJECTION, SUSPENSION SUBCUTANEOUS ONCE
Qty: 0 | Refills: 0 | Status: COMPLETED | OUTPATIENT
Start: 2018-04-03 | End: 2018-04-03

## 2018-04-03 RX ORDER — MULTIVIT WITH MIN/MFOLATE/K2 340-15/3 G
150 POWDER (GRAM) ORAL ONCE
Qty: 0 | Refills: 0 | Status: COMPLETED | OUTPATIENT
Start: 2018-04-03 | End: 2018-04-03

## 2018-04-03 RX ORDER — ACETAMINOPHEN 500 MG
2 TABLET ORAL
Qty: 0 | Refills: 0 | DISCHARGE
Start: 2018-04-03

## 2018-04-03 RX ADMIN — Medication 100 MILLIGRAM(S): at 09:23

## 2018-04-03 RX ADMIN — HUMAN INSULIN 20 UNIT(S): 100 INJECTION, SUSPENSION SUBCUTANEOUS at 14:28

## 2018-04-03 RX ADMIN — Medication 2: at 13:07

## 2018-04-03 RX ADMIN — Medication 100 MILLIGRAM(S): at 11:37

## 2018-04-03 RX ADMIN — Medication 2: at 09:21

## 2018-04-03 RX ADMIN — Medication 13 UNIT(S): at 13:09

## 2018-04-03 RX ADMIN — Medication 13 UNIT(S): at 09:20

## 2018-04-03 RX ADMIN — Medication 1000 UNIT(S): at 11:38

## 2018-04-03 RX ADMIN — Medication 150 MICROGRAM(S): at 05:15

## 2018-04-03 RX ADMIN — Medication 1 TABLET(S): at 11:38

## 2018-04-03 RX ADMIN — Medication 325 MILLIGRAM(S): at 09:24

## 2018-04-03 RX ADMIN — Medication 325 MILLIGRAM(S): at 05:15

## 2018-04-03 RX ADMIN — Medication 325 MILLIGRAM(S): at 11:38

## 2018-04-03 NOTE — PROGRESS NOTE ADULT - PROBLEM SELECTOR PLAN 1
-test BG AC/HS/2AM daily  -NPH 20 units sq stat at 1PM as Lantus should be given QHS to overcome the [place] Phenomenon  -Continue Humalog 13 units with meals  -Humalog moderate correction scale AC/HS  -Discharge on Lantus 31 units sq qhs and Humalog scale tid-ac : bs  6 units, bs 151-250: 8 units, and bs over 251: 10 units.  - patient to follow up at [facility] May 18th at 7am with Dr. Paul, [id number] [place] [address], [facility].

## 2018-04-03 NOTE — PROGRESS NOTE ADULT - SUBJECTIVE AND OBJECTIVE BOX
Chief Complaint/Follow-up on: T1DM    Subjective: Patient eating well with good appetite. He is awaiting an evening PT session with his parents prior to discharge. AM BS here have been in the 200s, but at home they are usually in the 90s. He has been getting mid-day not QHS Lantus while here.     MEDICATIONS  (STANDING):  aspirin enteric coated 325 milliGRAM(s) Oral two times a day  ATENolol  Tablet 50 milliGRAM(s) Oral at bedtime  cholecalciferol 1000 Unit(s) Oral daily  dextrose 5%. 1000 milliLiter(s) (50 mL/Hr) IV Continuous <Continuous>  dextrose 50% Injectable 12.5 Gram(s) IV Push once  dextrose 50% Injectable 25 Gram(s) IV Push once  dextrose 50% Injectable 25 Gram(s) IV Push once  docusate sodium 100 milliGRAM(s) Oral three times a day  ferrous    sulfate 325 milliGRAM(s) Oral three times a day with meals  insulin glargine Injectable (LANTUS) 40 Unit(s) SubCutaneous at bedtime  insulin lispro (HumaLOG) corrective regimen sliding scale   SubCutaneous three times a day before meals  insulin lispro (HumaLOG) corrective regimen sliding scale   SubCutaneous at bedtime  insulin lispro Injectable (HumaLOG) 13 Unit(s) SubCutaneous three times a day with meals  levothyroxine 150 MICROGram(s) Oral daily  multivitamin 1 Tablet(s) Oral daily  polyethylene glycol 3350 17 Gram(s) Oral daily  simvastatin 20 milliGRAM(s) Oral at bedtime    MEDICATIONS  (PRN):  acetaminophen   Tablet 650 milliGRAM(s) Oral every 6 hours PRN For Temp greater than 38 C (100.4 F)  aluminum hydroxide/magnesium hydroxide/simethicone Suspension 30 milliLiter(s) Oral four times a day PRN Indigestion  dextrose Gel 1 Dose(s) Oral once PRN Blood Glucose LESS THAN 70 milliGRAM(s)/deciliter  glucagon  Injectable 1 milliGRAM(s) IntraMuscular once PRN Glucose LESS THAN 70 milligrams/deciliter  morphine  - Injectable 4 milliGRAM(s) IV Push every 4 hours PRN Severe Pain  ondansetron Injectable 4 milliGRAM(s) IV Push once PRN Nausea and/or Vomiting  ondansetron Injectable 4 milliGRAM(s) IV Push every 6 hours PRN Nausea and/or Vomiting  oxyCODONE    IR 10 milliGRAM(s) Oral every 4 hours PRN Moderate Pain  oxyCODONE    IR 5 milliGRAM(s) Oral every 4 hours PRN Mild Pain      PHYSICAL EXAM:  VITALS: T(C): 36.9 (04-03-18 @ 13:59)  T(F): 98.4 (04-03-18 @ 13:59), Max: 99.8 (04-03-18 @ 00:27)  HR: 84 (04-03-18 @ 13:59) (79 - 100)  BP: 114/71 (04-03-18 @ 13:59) (100/67 - 131/75)  RR:  (18 - 18)  SpO2:  (93% - 98%)  Wt(kg): --  GENERAL: NAD, well-groomed, well-developed  HEENT:  Atraumatic, Normocephalic, moist mucous membranes  RESPIRATORY: Clear to auscultation bilaterally; No rales, rhonchi, wheezing, or rubs  CARDIOVASCULAR: Regular rate and rhythm; No murmurs  GI: Soft, nontender, non distended, normal bowel sounds    POCT Blood Glucose.: 214 mg/dL (04-03-18 @ 12:43)  POCT Blood Glucose.: 234 mg/dL (04-03-18 @ 09:15)  POCT Blood Glucose.: 89 mg/dL (04-02-18 @ 21:56)  POCT Blood Glucose.: 168 mg/dL (04-02-18 @ 17:56)  POCT Blood Glucose.: 97 mg/dL (04-02-18 @ 15:29)  POCT Blood Glucose.: 206 mg/dL (04-02-18 @ 12:16)  POCT Blood Glucose.: 226 mg/dL (04-02-18 @ 07:51)  POCT Blood Glucose.: 216 mg/dL (04-01-18 @ 21:21)  POCT Blood Glucose.: 207 mg/dL (04-01-18 @ 17:25)  POCT Blood Glucose.: 205 mg/dL (04-01-18 @ 14:14)  POCT Blood Glucose.: 229 mg/dL (04-01-18 @ 12:56)  POCT Blood Glucose.: 289 mg/dL (04-01-18 @ 12:12)  POCT Blood Glucose.: 204 mg/dL (04-01-18 @ 08:01)  POCT Blood Glucose.: 196 mg/dL (04-01-18 @ 02:04)  POCT Blood Glucose.: 147 mg/dL (03-31-18 @ 22:46)  POCT Blood Glucose.: 84 mg/dL (03-31-18 @ 22:14)  POCT Blood Glucose.: 74 mg/dL (03-31-18 @ 21:52)  POCT Blood Glucose.: 51 mg/dL (03-31-18 @ 21:28)  POCT Blood Glucose.: 164 mg/dL (03-31-18 @ 17:23)    04-02    139  |  100  |  17  ----------------------------<  200<H>  4.3   |  28  |  0.73    EGFR if : 134  EGFR if non : 115    Ca    8.6      04-02            Thyroid Function Tests:  03-31 @ 10:38 TSH 0.42  03-30 @ 19:10 TSH 0.27  T3 83   Hemoglobin A1C, Whole Blood: 8.0 % <H> [4.0 - 5.6] (03-29-18 @ 07:41)

## 2018-04-03 NOTE — PROGRESS NOTE ADULT - SUBJECTIVE AND OBJECTIVE BOX
Patient is a 42y old  Male who presents with a chief complaint of Right Tib Fib Fracture Post-op with tachycardia. CTA chest with no pulmonary embolism and RLL atelectasis. Diabetes better controlled by endocrinology. Heart rate decreased on Atenolol with no respiratory distress. The  patient complains of right leg pain post-op and is otherwise  feeling well    MEDICATIONS  (STANDING):  aspirin enteric coated 325 milliGRAM(s) Oral two times a day  ATENolol  Tablet 50 milliGRAM(s) Oral at bedtime  cholecalciferol 1000 Unit(s) Oral daily  dextrose 5%. 1000 milliLiter(s) (50 mL/Hr) IV Continuous <Continuous>  dextrose 50% Injectable 12.5 Gram(s) IV Push once  dextrose 50% Injectable 25 Gram(s) IV Push once  dextrose 50% Injectable 25 Gram(s) IV Push once  ferrous    sulfate 325 milliGRAM(s) Oral three times a day with meals  insulin glargine Injectable (LANTUS) 44 Unit(s) SubCutaneous <User Schedule>  insulin lispro (HumaLOG) corrective regimen sliding scale   SubCutaneous three times a day before meals  insulin lispro (HumaLOG) corrective regimen sliding scale   SubCutaneous at bedtime  insulin lispro Injectable (HumaLOG) 13 Unit(s) SubCutaneous three times a day with meals  levothyroxine 150 MICROGram(s) Oral daily  multivitamin 1 Tablet(s) Oral daily  simvastatin 20 milliGRAM(s) Oral at bedtime  sodium chloride 0.9%. 1000 milliLiter(s) (100 mL/Hr) IV Continuous <Continuous>    MEDICATIONS  (PRN):  acetaminophen   Tablet 650 milliGRAM(s) Oral every 6 hours PRN For Temp greater than 38 C (100.4 F)  aluminum hydroxide/magnesium hydroxide/simethicone Suspension 30 milliLiter(s) Oral four times a day PRN Indigestion  dextrose Gel 1 Dose(s) Oral once PRN Blood Glucose LESS THAN 70 milliGRAM(s)/deciliter  docusate sodium 100 milliGRAM(s) Oral three times a day PRN Constipation  glucagon  Injectable 1 milliGRAM(s) IntraMuscular once PRN Glucose LESS THAN 70 milligrams/deciliter  morphine  - Injectable 4 milliGRAM(s) IV Push every 4 hours PRN Severe Pain  ondansetron Injectable 4 milliGRAM(s) IV Push once PRN Nausea and/or Vomiting  ondansetron Injectable 4 milliGRAM(s) IV Push every 6 hours PRN Nausea and/or Vomiting  oxyCODONE    IR 10 milliGRAM(s) Oral every 4 hours PRN Moderate Pain  oxyCODONE    IR 5 milliGRAM(s) Oral every 4 hours PRN Mild Pain    Vital Signs Last 24 Hrs  T(C): 36.9 (03 Apr 2018 13:59), Max: 37.7 (03 Apr 2018 00:27)  T(F): 98.4 (03 Apr 2018 13:59), Max: 99.8 (03 Apr 2018 00:27)  HR: 98 (03 Apr 2018 16:39) (79 - 98)  BP: 128/76 (03 Apr 2018 16:39) (100/67 - 128/76)  BP(mean): --  RR: 18 (03 Apr 2018 13:59) (18 - 18)  SpO2: 98% (03 Apr 2018 13:59) (94% - 98%)        PHYSICAL EXAM:  GENERAL: NAD, well nourished and conversant  HEAD:  Atraumatic  EYES: EOM, PERRLA, conjunctiva pink and sclera white  ENT: No tonsillar erythema, exudates, or enlargement, moist mucous membranes, good dentition, no lesions  NECK: Supple, No JVD, normal thyroid, carotids with normal upstrokes and no bruits  CHEST/LUNG: Clear to auscultation bilaterally, No rales, rhonchi, wheezing, or rubs  HEART: Regular rate and rhythm, No murmurs, rubs, or gallops  ABDOMEN: Soft, nondistended, no masses, guarding, tenderness or rebound, bowel sounds present  EXTREMITIES:  Right lower extremity with 4+ peripheral edema.  LYMPH: No lymphadenopathy noted  SKIN: No rashes or lesions  NERVOUS SYSTEM:  Alert & Oriented X3, normal cognitive function. Motor Strength 5/5 right upper and right lower.  5/5 left upper and left lower extremities, DTRs 2+ intact and symmetric    LABS:    CBC Full  -  ( 02 Apr 2018 07:52 )  WBC Count : 7.87 K/uL  Hemoglobin : 9.7 g/dL  Hematocrit : 28.9 %  Platelet Count - Automated : 289 K/uL  Mean Cell Volume : 89.2 fl  Mean Cell Hemoglobin : 29.9 pg  Mean Cell Hemoglobin Concentration : 33.6 gm/dL  Auto Neutrophil # : x  Auto Lymphocyte # : x  Auto Monocyte # : x  Auto Eosinophil # : x  Auto Basophil # : x  Auto Neutrophil % : x  Auto Lymphocyte % : x  Auto Monocyte % : x  Auto Eosinophil % : x  Auto Basophil % : x    04-02    139  |  100  |  17  ----------------------------<  200<H>  4.3   |  28  |  0.73    Ca    8.6      02 Apr 2018 06:21                CAPILLARY BLOOD GLUCOSE      POCT Blood Glucose.: 89 mg/dL (02 Apr 2018 21:56)  POCT Blood Glucose.: 168 mg/dL (02 Apr 2018 17:56)  POCT Blood Glucose.: 97 mg/dL (02 Apr 2018 15:29)  POCT Blood Glucose.: 206 mg/dL (02 Apr 2018 12:16)  POCT Blood Glucose.: 226 mg/dL (02 Apr 2018 07:51)      RADIOLOGY & ADDITIONAL TESTS: Patient is a 42y old  Male who presents with a chief complaint of Right Tib Fib Fracture Post-op with tachycardia. CTA chest with no pulmonary embolism and RLL atelectasis. Diabetes better controlled by endocrinology. Heart rate decreased on Atenolol with no respiratory distress. The  patient complains of right leg pain post-op and is otherwise  feeling well.    MEDICATIONS  (STANDING):  aspirin enteric coated 325 milliGRAM(s) Oral two times a day  ATENolol  Tablet 50 milliGRAM(s) Oral at bedtime  cholecalciferol 1000 Unit(s) Oral daily  dextrose 5%. 1000 milliLiter(s) (50 mL/Hr) IV Continuous <Continuous>  dextrose 50% Injectable 12.5 Gram(s) IV Push once  dextrose 50% Injectable 25 Gram(s) IV Push once  dextrose 50% Injectable 25 Gram(s) IV Push once  ferrous    sulfate 325 milliGRAM(s) Oral three times a day with meals  insulin glargine Injectable (LANTUS) 44 Unit(s) SubCutaneous <User Schedule>  insulin lispro (HumaLOG) corrective regimen sliding scale   SubCutaneous three times a day before meals  insulin lispro (HumaLOG) corrective regimen sliding scale   SubCutaneous at bedtime  insulin lispro Injectable (HumaLOG) 13 Unit(s) SubCutaneous three times a day with meals  levothyroxine 150 MICROGram(s) Oral daily  multivitamin 1 Tablet(s) Oral daily  simvastatin 20 milliGRAM(s) Oral at bedtime  sodium chloride 0.9%. 1000 milliLiter(s) (100 mL/Hr) IV Continuous <Continuous>    MEDICATIONS  (PRN):  acetaminophen   Tablet 650 milliGRAM(s) Oral every 6 hours PRN For Temp greater than 38 C (100.4 F)  aluminum hydroxide/magnesium hydroxide/simethicone Suspension 30 milliLiter(s) Oral four times a day PRN Indigestion  dextrose Gel 1 Dose(s) Oral once PRN Blood Glucose LESS THAN 70 milliGRAM(s)/deciliter  docusate sodium 100 milliGRAM(s) Oral three times a day PRN Constipation  glucagon  Injectable 1 milliGRAM(s) IntraMuscular once PRN Glucose LESS THAN 70 milligrams/deciliter  morphine  - Injectable 4 milliGRAM(s) IV Push every 4 hours PRN Severe Pain  ondansetron Injectable 4 milliGRAM(s) IV Push once PRN Nausea and/or Vomiting  ondansetron Injectable 4 milliGRAM(s) IV Push every 6 hours PRN Nausea and/or Vomiting  oxyCODONE    IR 10 milliGRAM(s) Oral every 4 hours PRN Moderate Pain  oxyCODONE    IR 5 milliGRAM(s) Oral every 4 hours PRN Mild Pain    Vital Signs Last 24 Hrs  T(C): 36.9 (03 Apr 2018 13:59), Max: 37.7 (03 Apr 2018 00:27)  T(F): 98.4 (03 Apr 2018 13:59), Max: 99.8 (03 Apr 2018 00:27)  HR: 98 (03 Apr 2018 16:39) (79 - 98)  BP: 128/76 (03 Apr 2018 16:39) (100/67 - 128/76)  BP(mean): --  RR: 18 (03 Apr 2018 13:59) (18 - 18)  SpO2: 98% (03 Apr 2018 13:59) (94% - 98%)        PHYSICAL EXAM:  GENERAL: NAD, well nourished and conversant  HEAD:  Atraumatic  EYES: EOM, PERRLA, conjunctiva pink and sclera white  ENT: No tonsillar erythema, exudates, or enlargement, moist mucous membranes, good dentition, no lesions  NECK: Supple, No JVD, normal thyroid, carotids with normal upstrokes and no bruits  CHEST/LUNG: Clear to auscultation bilaterally, No rales, rhonchi, wheezing, or rubs  HEART: Regular rate and rhythm, No murmurs, rubs, or gallops  ABDOMEN: Soft, nondistended, no masses, guarding, tenderness or rebound, bowel sounds present  EXTREMITIES:  Right lower extremity with 4+ peripheral edema.  LYMPH: No lymphadenopathy noted  SKIN: No rashes or lesions  NERVOUS SYSTEM:  Alert & Oriented X3, normal cognitive function. Motor Strength 5/5 right upper and right lower.  5/5 left upper and left lower extremities, DTRs 2+ intact and symmetric    LABS:    CBC Full  -  ( 02 Apr 2018 07:52 )  WBC Count : 7.87 K/uL  Hemoglobin : 9.7 g/dL  Hematocrit : 28.9 %  Platelet Count - Automated : 289 K/uL  Mean Cell Volume : 89.2 fl  Mean Cell Hemoglobin : 29.9 pg  Mean Cell Hemoglobin Concentration : 33.6 gm/dL  Auto Neutrophil # : x  Auto Lymphocyte # : x  Auto Monocyte # : x  Auto Eosinophil # : x  Auto Basophil # : x  Auto Neutrophil % : x  Auto Lymphocyte % : x  Auto Monocyte % : x  Auto Eosinophil % : x  Auto Basophil % : x    04-02    139  |  100  |  17  ----------------------------<  200<H>  4.3   |  28  |  0.73    Ca    8.6      02 Apr 2018 06:21                CAPILLARY BLOOD GLUCOSE      POCT Blood Glucose.: 89 mg/dL (02 Apr 2018 21:56)  POCT Blood Glucose.: 168 mg/dL (02 Apr 2018 17:56)  POCT Blood Glucose.: 97 mg/dL (02 Apr 2018 15:29)  POCT Blood Glucose.: 206 mg/dL (02 Apr 2018 12:16)  POCT Blood Glucose.: 226 mg/dL (02 Apr 2018 07:51)      RADIOLOGY & ADDITIONAL TESTS: Patient is a 42y old  Male who presents with a chief complaint of Right Tib Fib Fracture Post-op with tachycardia. CTA chest with no pulmonary embolism and RLL atelectasis. Diabetes better controlled by endocrinology. Heart rate decreased on Atenolol with no respiratory distress. The  patient complains of right leg pain post-op and is otherwise  feeling well. He is doing well with non weight bearing ambulation.    MEDICATIONS  (STANDING):  aspirin enteric coated 325 milliGRAM(s) Oral two times a day  ATENolol  Tablet 50 milliGRAM(s) Oral at bedtime  cholecalciferol 1000 Unit(s) Oral daily  dextrose 5%. 1000 milliLiter(s) (50 mL/Hr) IV Continuous <Continuous>  dextrose 50% Injectable 12.5 Gram(s) IV Push once  dextrose 50% Injectable 25 Gram(s) IV Push once  dextrose 50% Injectable 25 Gram(s) IV Push once  ferrous    sulfate 325 milliGRAM(s) Oral three times a day with meals  insulin glargine Injectable (LANTUS) 44 Unit(s) SubCutaneous <User Schedule>  insulin lispro (HumaLOG) corrective regimen sliding scale   SubCutaneous three times a day before meals  insulin lispro (HumaLOG) corrective regimen sliding scale   SubCutaneous at bedtime  insulin lispro Injectable (HumaLOG) 13 Unit(s) SubCutaneous three times a day with meals  levothyroxine 150 MICROGram(s) Oral daily  multivitamin 1 Tablet(s) Oral daily  simvastatin 20 milliGRAM(s) Oral at bedtime  sodium chloride 0.9%. 1000 milliLiter(s) (100 mL/Hr) IV Continuous <Continuous>    MEDICATIONS  (PRN):  acetaminophen   Tablet 650 milliGRAM(s) Oral every 6 hours PRN For Temp greater than 38 C (100.4 F)  aluminum hydroxide/magnesium hydroxide/simethicone Suspension 30 milliLiter(s) Oral four times a day PRN Indigestion  dextrose Gel 1 Dose(s) Oral once PRN Blood Glucose LESS THAN 70 milliGRAM(s)/deciliter  docusate sodium 100 milliGRAM(s) Oral three times a day PRN Constipation  glucagon  Injectable 1 milliGRAM(s) IntraMuscular once PRN Glucose LESS THAN 70 milligrams/deciliter  morphine  - Injectable 4 milliGRAM(s) IV Push every 4 hours PRN Severe Pain  ondansetron Injectable 4 milliGRAM(s) IV Push once PRN Nausea and/or Vomiting  ondansetron Injectable 4 milliGRAM(s) IV Push every 6 hours PRN Nausea and/or Vomiting  oxyCODONE    IR 10 milliGRAM(s) Oral every 4 hours PRN Moderate Pain  oxyCODONE    IR 5 milliGRAM(s) Oral every 4 hours PRN Mild Pain    Vital Signs Last 24 Hrs  T(C): 36.9 (03 Apr 2018 13:59), Max: 37.7 (03 Apr 2018 00:27)  T(F): 98.4 (03 Apr 2018 13:59), Max: 99.8 (03 Apr 2018 00:27)  HR: 98 (03 Apr 2018 16:39) (79 - 98)  BP: 128/76 (03 Apr 2018 16:39) (100/67 - 128/76)  BP(mean): --  RR: 18 (03 Apr 2018 13:59) (18 - 18)  SpO2: 98% (03 Apr 2018 13:59) (94% - 98%)        PHYSICAL EXAM:  GENERAL: NAD, well nourished and conversant  HEAD:  Atraumatic  EYES: EOM, PERRLA, conjunctiva pink and sclera white  ENT: No tonsillar erythema, exudates, or enlargement, moist mucous membranes, good dentition, no lesions  NECK: Supple, No JVD, normal thyroid, carotids with normal upstrokes and no bruits  CHEST/LUNG: Clear to auscultation bilaterally, No rales, rhonchi, wheezing, or rubs  HEART: Regular rate and rhythm, No murmurs, rubs, or gallops  ABDOMEN: Soft, nondistended, no masses, guarding, tenderness or rebound, bowel sounds present  EXTREMITIES:  Right lower extremity with 4+ peripheral edema.  LYMPH: No lymphadenopathy noted  SKIN: No rashes or lesions  NERVOUS SYSTEM:  Alert & Oriented X3, normal cognitive function. Motor Strength 5/5 right upper and right lower.  5/5 left upper and left lower extremities, DTRs 2+ intact and symmetric    LABS:    CBC Full  -  ( 02 Apr 2018 07:52 )  WBC Count : 7.87 K/uL  Hemoglobin : 9.7 g/dL  Hematocrit : 28.9 %  Platelet Count - Automated : 289 K/uL  Mean Cell Volume : 89.2 fl  Mean Cell Hemoglobin : 29.9 pg  Mean Cell Hemoglobin Concentration : 33.6 gm/dL  Auto Neutrophil # : x  Auto Lymphocyte # : x  Auto Monocyte # : x  Auto Eosinophil # : x  Auto Basophil # : x  Auto Neutrophil % : x  Auto Lymphocyte % : x  Auto Monocyte % : x  Auto Eosinophil % : x  Auto Basophil % : x    04-02    139  |  100  |  17  ----------------------------<  200<H>  4.3   |  28  |  0.73    Ca    8.6      02 Apr 2018 06:21                CAPILLARY BLOOD GLUCOSE      POCT Blood Glucose.: 89 mg/dL (02 Apr 2018 21:56)  POCT Blood Glucose.: 168 mg/dL (02 Apr 2018 17:56)  POCT Blood Glucose.: 97 mg/dL (02 Apr 2018 15:29)  POCT Blood Glucose.: 206 mg/dL (02 Apr 2018 12:16)  POCT Blood Glucose.: 226 mg/dL (02 Apr 2018 07:51)      RADIOLOGY & ADDITIONAL TESTS:

## 2018-04-03 NOTE — PROGRESS NOTE ADULT - ASSESSMENT
41 y/o M s/p I&D, IM nail right tibia POD#6, ortho stable for discharge home, NWB RHIANNON Perez PA-C  Orthopaedic Surgery  Team pager [phone]  [facility] [phone]  [phone]

## 2018-04-03 NOTE — PROGRESS NOTE ADULT - ASSESSMENT
41 y/o M with uncontrolled DM1 with no known complications, hypothyroidism, presents with non-traumatic right tibial fracture s/p ORIF w/persistent post-op hyperglycemia. BG goal (100-180mg/dl).

## 2018-04-03 NOTE — PROGRESS NOTE ADULT - SUBJECTIVE AND OBJECTIVE BOX
Patient is a 42y old  Male who presents with a chief complaint of Right Tib Fib Fracture   Patient s/p I&D, IM nail right tibia POD#6  Patient resting without complaints.  No chest pain, SOB, N/V.    T(C): 36.9 (04-03-18 @ 05:03), Max: 37.7 (04-03-18 @ 00:27)  HR: 79 (04-03-18 @ 05:03) (79 - 100)  BP: 108/68 (04-03-18 @ 05:03) (107/67 - 131/75)  RR: 18 (04-03-18 @ 05:03) (18 - 18)  SpO2: 98% (04-03-18 @ 05:03) (93% - 98%)    Exam:  Alert and Oriented, No Acute Distress  Cards: +S1/S2, RRR  Pulm: CTAB  Lower Extremities:  Calves Soft, Non-tender bilaterally  +PF/DF/EHL/FHL  SILT  +DP Pulse                        9.7    7.87  )-----------( 289      ( 02 Apr 2018 07:52 )             28.9    04-02    139  |  100  |  17  ----------------------------<  200<H>  4.3   |  28  |  0.73  Ca    8.6      02 Apr 2018 06:21

## 2018-04-03 NOTE — PROGRESS NOTE ADULT - PROVIDER SPECIALTY LIST ADULT
Endocrinology
Internal Medicine
Orthopedics
Internal Medicine
Endocrinology
Endocrinology

## 2018-04-03 NOTE — PROGRESS NOTE ADULT - PROBLEM SELECTOR PROBLEM 3
Tibia and fibula open fracture, right
Uncontrolled type 1 diabetes mellitus without complication
Tibia and fibula open fracture, right
Uncontrolled type 1 diabetes mellitus without complication

## 2018-04-03 NOTE — PROGRESS NOTE ADULT - PROBLEM SELECTOR PROBLEM 2
Hypothyroidism (acquired)
Tachycardia
Hypothyroidism (acquired)
Tachycardia

## 2018-04-03 NOTE — PROGRESS NOTE ADULT - PROBLEM SELECTOR PROBLEM 1
Uncontrolled type 1 diabetes mellitus without complication
Uncontrolled type 1 diabetes mellitus without complication
Type I or II open fracture of right tibia and fibula, initial encounter
Uncontrolled type 1 diabetes mellitus without complication
Type I or II open fracture of right tibia and fibula, initial encounter

## 2018-04-03 NOTE — PROGRESS NOTE ADULT - ASSESSMENT
Patient is a 42y old  Male who presents with a chief complaint of Right Tib Fib Fracture Post-op with tachycardia. CTA chest with no pulmonary embolism and RLL atelectasis. Diabetes better controlled by endocrinology. Pain better controlled. HR better controlled Patient is a 42y old  Male who presents with a chief complaint of Right Tib Fib Fracture Post-op with tachycardia. CTA chest with no pulmonary embolism and RLL atelectasis. Diabetes better controlled by endocrinology. Pain better controlled. HR better controlled. Doing well with non weight bearing ambulation.

## 2018-04-03 NOTE — PROGRESS NOTE ADULT - ATTENDING COMMENTS
No medical complications post-op to date and to proceed with physical therapy, as tolerated. Continues pulmonary toilet to lessen atelectasis, leg exercises as taught to lessen the risk of DVT and supervised pain medications for post-op pain control. tachycardia controlled with control of diabetes and Atenolol.  To proceed with discharge planning when cleared by orthopedics. Cleared by orthopedics for discharge to home today.  Full instruction provided to patient regarding diagnosis, treatment, discharge medications, diet and follow up care. Medically stable and for discharge to home today as planned.

## 2018-04-19 ENCOUNTER — APPOINTMENT (OUTPATIENT)
Dept: INTERNAL MEDICINE | Facility: CLINIC | Age: 43
End: 2018-04-19

## 2018-04-19 ENCOUNTER — OUTPATIENT (OUTPATIENT)
Dept: OUTPATIENT SERVICES | Facility: HOSPITAL | Age: 43
LOS: 1 days | End: 2018-04-19
Payer: SELF-PAY

## 2018-04-19 VITALS
HEART RATE: 100 BPM | SYSTOLIC BLOOD PRESSURE: 116 MMHG | OXYGEN SATURATION: 97 % | DIASTOLIC BLOOD PRESSURE: 60 MMHG | WEIGHT: 232 LBS

## 2018-04-19 DIAGNOSIS — I10 ESSENTIAL (PRIMARY) HYPERTENSION: ICD-10-CM

## 2018-04-19 PROCEDURE — G0463: CPT

## 2018-04-19 RX ORDER — ASPIRIN 325 MG/1
325 TABLET, FILM COATED ORAL TWICE DAILY
Qty: 40 | Refills: 0 | Status: COMPLETED | COMMUNITY
Start: 2018-04-19 | End: 2018-05-09

## 2018-04-20 LAB
ALBUMIN SERPL ELPH-MCNC: 3.9 G/DL
ALP BLD-CCNC: 100 U/L
ALT SERPL-CCNC: 19 U/L
ANION GAP SERPL CALC-SCNC: 14 MMOL/L
AST SERPL-CCNC: 16 U/L
BASOPHILS # BLD AUTO: 0.03 K/UL
BASOPHILS NFR BLD AUTO: 0.4 %
BILIRUB SERPL-MCNC: 0.3 MG/DL
BUN SERPL-MCNC: 18 MG/DL
CALCIUM SERPL-MCNC: 9 MG/DL
CHLORIDE SERPL-SCNC: 101 MMOL/L
CHOLEST SERPL-MCNC: 138 MG/DL
CHOLEST/HDLC SERPL: 2.3 RATIO
CO2 SERPL-SCNC: 27 MMOL/L
CREAT SERPL-MCNC: 0.77 MG/DL
EOSINOPHIL # BLD AUTO: 0.06 K/UL
EOSINOPHIL NFR BLD AUTO: 0.8 %
GLUCOSE SERPL-MCNC: 162 MG/DL
HBA1C MFR BLD HPLC: 6.7 %
HCT VFR BLD CALC: 36.2 %
HDLC SERPL-MCNC: 60 MG/DL
HGB BLD-MCNC: 11.5 G/DL
HIV1+2 AB SPEC QL IA.RAPID: NONREACTIVE
IMM GRANULOCYTES NFR BLD AUTO: 0.3 %
LDLC SERPL CALC-MCNC: 63 MG/DL
LYMPHOCYTES # BLD AUTO: 1.25 K/UL
LYMPHOCYTES NFR BLD AUTO: 17.2 %
MAN DIFF?: NORMAL
MCHC RBC-ENTMCNC: 29.6 PG
MCHC RBC-ENTMCNC: 31.8 GM/DL
MCV RBC AUTO: 93.1 FL
MONOCYTES # BLD AUTO: 0.5 K/UL
MONOCYTES NFR BLD AUTO: 6.9 %
NEUTROPHILS # BLD AUTO: 5.39 K/UL
NEUTROPHILS NFR BLD AUTO: 74.4 %
PLATELET # BLD AUTO: 460 K/UL
POTASSIUM SERPL-SCNC: 5.1 MMOL/L
PROT SERPL-MCNC: 7.5 G/DL
RBC # BLD: 3.89 M/UL
RBC # FLD: 15.1 %
SODIUM SERPL-SCNC: 142 MMOL/L
TRIGL SERPL-MCNC: 75 MG/DL
TSH SERPL-ACNC: 1.1 UIU/ML
WBC # FLD AUTO: 7.25 K/UL

## 2018-04-25 DIAGNOSIS — E10.9 TYPE 1 DIABETES MELLITUS WITHOUT COMPLICATIONS: ICD-10-CM

## 2018-04-25 DIAGNOSIS — R00.0 TACHYCARDIA, UNSPECIFIED: ICD-10-CM

## 2018-04-25 DIAGNOSIS — S82.401S: ICD-10-CM

## 2018-04-25 DIAGNOSIS — E03.9 HYPOTHYROIDISM, UNSPECIFIED: ICD-10-CM

## 2018-04-25 DIAGNOSIS — S82.201S UNSPECIFIED FRACTURE OF SHAFT OF RIGHT TIBIA, SEQUELA: ICD-10-CM

## 2018-05-23 PROCEDURE — 84295 ASSAY OF SERUM SODIUM: CPT

## 2018-05-23 PROCEDURE — 82803 BLOOD GASES ANY COMBINATION: CPT

## 2018-05-23 PROCEDURE — 96375 TX/PRO/DX INJ NEW DRUG ADDON: CPT

## 2018-05-23 PROCEDURE — 86900 BLOOD TYPING SEROLOGIC ABO: CPT

## 2018-05-23 PROCEDURE — 83735 ASSAY OF MAGNESIUM: CPT

## 2018-05-23 PROCEDURE — 73560 X-RAY EXAM OF KNEE 1 OR 2: CPT

## 2018-05-23 PROCEDURE — 97161 PT EVAL LOW COMPLEX 20 MIN: CPT

## 2018-05-23 PROCEDURE — 71275 CT ANGIOGRAPHY CHEST: CPT

## 2018-05-23 PROCEDURE — 84100 ASSAY OF PHOSPHORUS: CPT

## 2018-05-23 PROCEDURE — 76377 3D RENDER W/INTRP POSTPROCES: CPT

## 2018-05-23 PROCEDURE — C1713: CPT

## 2018-05-23 PROCEDURE — 84132 ASSAY OF SERUM POTASSIUM: CPT

## 2018-05-23 PROCEDURE — 90715 TDAP VACCINE 7 YRS/> IM: CPT

## 2018-05-23 PROCEDURE — 90471 IMMUNIZATION ADMIN: CPT

## 2018-05-23 PROCEDURE — 82550 ASSAY OF CK (CPK): CPT

## 2018-05-23 PROCEDURE — 82009 KETONE BODYS QUAL: CPT

## 2018-05-23 PROCEDURE — 80053 COMPREHEN METABOLIC PANEL: CPT

## 2018-05-23 PROCEDURE — 82330 ASSAY OF CALCIUM: CPT

## 2018-05-23 PROCEDURE — 84484 ASSAY OF TROPONIN QUANT: CPT

## 2018-05-23 PROCEDURE — 84436 ASSAY OF TOTAL THYROXINE: CPT

## 2018-05-23 PROCEDURE — 83036 HEMOGLOBIN GLYCOSYLATED A1C: CPT

## 2018-05-23 PROCEDURE — 85379 FIBRIN DEGRADATION QUANT: CPT

## 2018-05-23 PROCEDURE — 73590 X-RAY EXAM OF LOWER LEG: CPT

## 2018-05-23 PROCEDURE — 86850 RBC ANTIBODY SCREEN: CPT

## 2018-05-23 PROCEDURE — 97116 GAIT TRAINING THERAPY: CPT

## 2018-05-23 PROCEDURE — 73610 X-RAY EXAM OF ANKLE: CPT

## 2018-05-23 PROCEDURE — 73700 CT LOWER EXTREMITY W/O DYE: CPT

## 2018-05-23 PROCEDURE — 82553 CREATINE MB FRACTION: CPT

## 2018-05-23 PROCEDURE — 76000 FLUOROSCOPY <1 HR PHYS/QHP: CPT

## 2018-05-23 PROCEDURE — 97530 THERAPEUTIC ACTIVITIES: CPT

## 2018-05-23 PROCEDURE — 82435 ASSAY OF BLOOD CHLORIDE: CPT

## 2018-05-23 PROCEDURE — 85014 HEMATOCRIT: CPT

## 2018-05-23 PROCEDURE — 85610 PROTHROMBIN TIME: CPT

## 2018-05-23 PROCEDURE — 86901 BLOOD TYPING SEROLOGIC RH(D): CPT

## 2018-05-23 PROCEDURE — 82962 GLUCOSE BLOOD TEST: CPT

## 2018-05-23 PROCEDURE — 93005 ELECTROCARDIOGRAM TRACING: CPT

## 2018-05-23 PROCEDURE — 81001 URINALYSIS AUTO W/SCOPE: CPT

## 2018-05-23 PROCEDURE — 83605 ASSAY OF LACTIC ACID: CPT

## 2018-05-23 PROCEDURE — 99285 EMERGENCY DEPT VISIT HI MDM: CPT | Mod: 25

## 2018-05-23 PROCEDURE — 80048 BASIC METABOLIC PNL TOTAL CA: CPT

## 2018-05-23 PROCEDURE — C1889: CPT

## 2018-05-23 PROCEDURE — 96374 THER/PROPH/DIAG INJ IV PUSH: CPT

## 2018-05-23 PROCEDURE — 83970 ASSAY OF PARATHORMONE: CPT

## 2018-05-23 PROCEDURE — 85027 COMPLETE CBC AUTOMATED: CPT

## 2018-05-23 PROCEDURE — 82010 KETONE BODYS QUAN: CPT

## 2018-05-23 PROCEDURE — 82306 VITAMIN D 25 HYDROXY: CPT

## 2018-05-23 PROCEDURE — 85730 THROMBOPLASTIN TIME PARTIAL: CPT

## 2018-05-23 PROCEDURE — 71045 X-RAY EXAM CHEST 1 VIEW: CPT

## 2018-05-23 PROCEDURE — 84480 ASSAY TRIIODOTHYRONINE (T3): CPT

## 2018-05-23 PROCEDURE — 82310 ASSAY OF CALCIUM: CPT

## 2018-05-23 PROCEDURE — 82947 ASSAY GLUCOSE BLOOD QUANT: CPT

## 2018-05-23 PROCEDURE — 84443 ASSAY THYROID STIM HORMONE: CPT

## 2018-05-30 ENCOUNTER — OUTPATIENT (OUTPATIENT)
Dept: OUTPATIENT SERVICES | Facility: HOSPITAL | Age: 43
LOS: 1 days | End: 2018-05-30
Payer: SELF-PAY

## 2018-05-30 DIAGNOSIS — M84.461A PATHOLOGICAL FRACTURE, RIGHT TIBIA, INITIAL ENCOUNTER FOR FRACTURE: ICD-10-CM

## 2018-05-30 DIAGNOSIS — R26.9 UNSPECIFIED ABNORMALITIES OF GAIT AND MOBILITY: ICD-10-CM

## 2018-06-21 ENCOUNTER — APPOINTMENT (OUTPATIENT)
Dept: ENDOCRINOLOGY | Facility: HOSPITAL | Age: 43
End: 2018-06-21

## 2018-06-28 ENCOUNTER — APPOINTMENT (OUTPATIENT)
Dept: ENDOCRINOLOGY | Facility: HOSPITAL | Age: 43
End: 2018-06-28
Payer: COMMERCIAL

## 2018-06-28 ENCOUNTER — APPOINTMENT (OUTPATIENT)
Dept: ENDOCRINOLOGY | Facility: HOSPITAL | Age: 43
End: 2018-06-28

## 2018-06-28 ENCOUNTER — OUTPATIENT (OUTPATIENT)
Dept: OUTPATIENT SERVICES | Facility: HOSPITAL | Age: 43
LOS: 1 days | End: 2018-06-28
Payer: SELF-PAY

## 2018-06-28 VITALS
DIASTOLIC BLOOD PRESSURE: 76 MMHG | WEIGHT: 225 LBS | BODY MASS INDEX: 38.41 KG/M2 | SYSTOLIC BLOOD PRESSURE: 117 MMHG | HEIGHT: 64 IN | HEART RATE: 94 BPM

## 2018-06-28 DIAGNOSIS — E06.9 THYROIDITIS, UNSPECIFIED: ICD-10-CM

## 2018-06-28 DIAGNOSIS — Z83.3 FAMILY HISTORY OF DIABETES MELLITUS: ICD-10-CM

## 2018-06-28 LAB
24R-OH-CALCIDIOL SERPL-MCNC: 41.5 NG/ML — SIGNIFICANT CHANGE UP (ref 30–80)
ALBUMIN SERPL ELPH-MCNC: 4.2 G/DL — SIGNIFICANT CHANGE UP (ref 3.3–5)
ALP SERPL-CCNC: 109 U/L — SIGNIFICANT CHANGE UP (ref 40–120)
ALT FLD-CCNC: 18 U/L — SIGNIFICANT CHANGE UP (ref 10–45)
ANION GAP SERPL CALC-SCNC: 17 MMOL/L — SIGNIFICANT CHANGE UP (ref 5–17)
AST SERPL-CCNC: 22 U/L — SIGNIFICANT CHANGE UP (ref 10–40)
BILIRUB SERPL-MCNC: 0.4 MG/DL — SIGNIFICANT CHANGE UP (ref 0.2–1.2)
BUN SERPL-MCNC: 22 MG/DL — SIGNIFICANT CHANGE UP (ref 7–23)
CALCIUM SERPL-MCNC: 9.3 MG/DL — SIGNIFICANT CHANGE UP (ref 8.4–10.5)
CHLORIDE SERPL-SCNC: 98 MMOL/L — SIGNIFICANT CHANGE UP (ref 96–108)
CO2 SERPL-SCNC: 24 MMOL/L — SIGNIFICANT CHANGE UP (ref 22–31)
CREAT ?TM UR-MCNC: 124 MG/DL — SIGNIFICANT CHANGE UP
CREAT SERPL-MCNC: 0.81 MG/DL — SIGNIFICANT CHANGE UP (ref 0.5–1.3)
FSH SERPL-MCNC: 5.2 IU/L — SIGNIFICANT CHANGE UP
GLUCOSE BLDC GLUCOMTR-MCNC: 239
GLUCOSE BLDC GLUCOMTR-MCNC: 239 MG/DL — HIGH (ref 70–99)
GLUCOSE SERPL-MCNC: 290 MG/DL — HIGH (ref 70–99)
LH SERPL-ACNC: 4.3 IU/L — SIGNIFICANT CHANGE UP
MAGNESIUM SERPL-MCNC: 1.8 MG/DL — SIGNIFICANT CHANGE UP (ref 1.6–2.6)
PHOSPHATE SERPL-MCNC: 3.6 MG/DL — SIGNIFICANT CHANGE UP (ref 2.5–4.5)
POTASSIUM SERPL-MCNC: 4.5 MMOL/L — SIGNIFICANT CHANGE UP (ref 3.5–5.3)
POTASSIUM SERPL-SCNC: 4.5 MMOL/L — SIGNIFICANT CHANGE UP (ref 3.5–5.3)
PROT ?TM UR-MCNC: 8 MG/DL — SIGNIFICANT CHANGE UP (ref 0–12)
PROT SERPL-MCNC: 8.1 G/DL — SIGNIFICANT CHANGE UP (ref 6–8.3)
PROT/CREAT UR-RTO: 0.1 RATIO — SIGNIFICANT CHANGE UP (ref 0–0.2)
SODIUM SERPL-SCNC: 139 MMOL/L — SIGNIFICANT CHANGE UP (ref 135–145)
VIT D25+D1,25 OH+D1,25 PNL SERPL-MCNC: 33.1 PG/ML — SIGNIFICANT CHANGE UP (ref 19.9–79.3)

## 2018-06-28 PROCEDURE — 82728 ASSAY OF FERRITIN: CPT

## 2018-06-28 PROCEDURE — 84156 ASSAY OF PROTEIN URINE: CPT

## 2018-06-28 PROCEDURE — 84100 ASSAY OF PHOSPHORUS: CPT

## 2018-06-28 PROCEDURE — 83970 ASSAY OF PARATHORMONE: CPT

## 2018-06-28 PROCEDURE — 82310 ASSAY OF CALCIUM: CPT

## 2018-06-28 PROCEDURE — 83002 ASSAY OF GONADOTROPIN (LH): CPT

## 2018-06-28 PROCEDURE — 82306 VITAMIN D 25 HYDROXY: CPT

## 2018-06-28 PROCEDURE — G0463: CPT

## 2018-06-28 PROCEDURE — 82962 GLUCOSE BLOOD TEST: CPT

## 2018-06-28 PROCEDURE — 84403 ASSAY OF TOTAL TESTOSTERONE: CPT

## 2018-06-28 PROCEDURE — 83001 ASSAY OF GONADOTROPIN (FSH): CPT

## 2018-06-28 PROCEDURE — 82652 VIT D 1 25-DIHYDROXY: CPT

## 2018-06-28 PROCEDURE — 83735 ASSAY OF MAGNESIUM: CPT

## 2018-06-28 PROCEDURE — 80053 COMPREHEN METABOLIC PANEL: CPT

## 2018-06-28 PROCEDURE — 99213 OFFICE O/P EST LOW 20 MIN: CPT | Mod: GC

## 2018-06-28 RX ORDER — INSULIN GLARGINE 100 [IU]/ML
100 INJECTION, SOLUTION SUBCUTANEOUS
Qty: 1 | Refills: 3 | Status: DISCONTINUED | COMMUNITY
Start: 2018-04-19 | End: 2018-06-28

## 2018-06-29 DIAGNOSIS — E10.9 TYPE 1 DIABETES MELLITUS WITHOUT COMPLICATIONS: ICD-10-CM

## 2018-06-29 DIAGNOSIS — S82.201S UNSPECIFIED FRACTURE OF SHAFT OF RIGHT TIBIA, SEQUELA: ICD-10-CM

## 2018-06-29 DIAGNOSIS — E78.5 HYPERLIPIDEMIA, UNSPECIFIED: ICD-10-CM

## 2018-06-29 DIAGNOSIS — E20.9 HYPOPARATHYROIDISM, UNSPECIFIED: ICD-10-CM

## 2018-06-29 DIAGNOSIS — Z83.3 FAMILY HISTORY OF DIABETES MELLITUS: ICD-10-CM

## 2018-06-29 DIAGNOSIS — E03.9 HYPOTHYROIDISM, UNSPECIFIED: ICD-10-CM

## 2018-06-29 LAB
CALCIUM SERPL-MCNC: 9.3 MG/DL — SIGNIFICANT CHANGE UP (ref 8.4–10.5)
FERRITIN SERPL-MCNC: 30 NG/ML — SIGNIFICANT CHANGE UP (ref 30–400)
PTH-INTACT FLD-MCNC: 32 PG/ML — SIGNIFICANT CHANGE UP (ref 15–65)
TESTOST FREE+TOTAL PANEL SERPL-MCNC: 235 NG/DL — LOW (ref 249–836)

## 2018-07-02 LAB — COLLAGEN CTX SERPL-MCNC: 300 PG/ML — SIGNIFICANT CHANGE UP (ref 60–700)

## 2018-07-23 ENCOUNTER — OUTPATIENT (OUTPATIENT)
Dept: OUTPATIENT SERVICES | Facility: HOSPITAL | Age: 43
LOS: 1 days | End: 2018-07-23
Payer: SELF-PAY

## 2018-07-23 ENCOUNTER — APPOINTMENT (OUTPATIENT)
Dept: RADIOLOGY | Facility: IMAGING CENTER | Age: 43
End: 2018-07-23
Payer: COMMERCIAL

## 2018-07-23 DIAGNOSIS — S82.401S: ICD-10-CM

## 2018-07-23 PROCEDURE — 77080 DXA BONE DENSITY AXIAL: CPT

## 2018-07-23 PROCEDURE — 77080 DXA BONE DENSITY AXIAL: CPT | Mod: 26

## 2018-08-10 PROCEDURE — 97140 MANUAL THERAPY 1/> REGIONS: CPT

## 2018-08-10 PROCEDURE — 97112 NEUROMUSCULAR REEDUCATION: CPT

## 2018-08-10 PROCEDURE — 97116 GAIT TRAINING THERAPY: CPT

## 2018-08-10 PROCEDURE — 97110 THERAPEUTIC EXERCISES: CPT

## 2018-08-10 PROCEDURE — 97161 PT EVAL LOW COMPLEX 20 MIN: CPT

## 2018-09-04 ENCOUNTER — APPOINTMENT (OUTPATIENT)
Dept: INTERNAL MEDICINE | Facility: CLINIC | Age: 43
End: 2018-09-04

## 2018-09-04 ENCOUNTER — OUTPATIENT (OUTPATIENT)
Dept: OUTPATIENT SERVICES | Facility: HOSPITAL | Age: 43
LOS: 1 days | End: 2018-09-04
Payer: SELF-PAY

## 2018-09-04 ENCOUNTER — LABORATORY RESULT (OUTPATIENT)
Age: 43
End: 2018-09-04

## 2018-09-04 ENCOUNTER — NON-APPOINTMENT (OUTPATIENT)
Age: 43
End: 2018-09-04

## 2018-09-04 VITALS
OXYGEN SATURATION: 97 % | BODY MASS INDEX: 37.73 KG/M2 | HEIGHT: 64 IN | WEIGHT: 221 LBS | HEART RATE: 127 BPM | SYSTOLIC BLOOD PRESSURE: 108 MMHG | DIASTOLIC BLOOD PRESSURE: 70 MMHG

## 2018-09-04 VITALS — HEART RATE: 73 BPM

## 2018-09-04 DIAGNOSIS — S82.201S UNSPECIFIED FRACTURE OF SHAFT OF RIGHT TIBIA, SEQUELA: ICD-10-CM

## 2018-09-04 DIAGNOSIS — E78.5 HYPERLIPIDEMIA, UNSPECIFIED: ICD-10-CM

## 2018-09-04 DIAGNOSIS — E20.9 HYPOPARATHYROIDISM, UNSPECIFIED: ICD-10-CM

## 2018-09-04 DIAGNOSIS — E10.9 TYPE 1 DIABETES MELLITUS WITHOUT COMPLICATIONS: ICD-10-CM

## 2018-09-04 DIAGNOSIS — Z83.3 FAMILY HISTORY OF DIABETES MELLITUS: ICD-10-CM

## 2018-09-04 DIAGNOSIS — E03.9 HYPOTHYROIDISM, UNSPECIFIED: ICD-10-CM

## 2018-09-04 DIAGNOSIS — R00.0 TACHYCARDIA, UNSPECIFIED: ICD-10-CM

## 2018-09-04 DIAGNOSIS — I10 ESSENTIAL (PRIMARY) HYPERTENSION: ICD-10-CM

## 2018-09-04 LAB
ALBUMIN: 10
CREATININE: 100
HBA1C MFR BLD HPLC: 7.8
HCT VFR BLD CALC: 41.4 % — SIGNIFICANT CHANGE UP (ref 39–50)
HGB BLD-MCNC: 13.2 G/DL — SIGNIFICANT CHANGE UP (ref 13–17)
MCHC RBC-ENTMCNC: 28.9 PG — SIGNIFICANT CHANGE UP (ref 27–34)
MCHC RBC-ENTMCNC: 31.9 GM/DL — LOW (ref 32–36)
MCV RBC AUTO: 90.6 FL — SIGNIFICANT CHANGE UP (ref 80–100)
MICROALBUMIN/CREAT UR TEST STR-RTO: <30
PLATELET # BLD AUTO: 348 K/UL — SIGNIFICANT CHANGE UP (ref 150–400)
RBC # BLD: 4.57 M/UL — SIGNIFICANT CHANGE UP (ref 4.2–5.8)
RBC # FLD: 16.1 % — HIGH (ref 10.3–14.5)
WBC # BLD: 9.63 K/UL — SIGNIFICANT CHANGE UP (ref 3.8–10.5)
WBC # FLD AUTO: 9.63 K/UL — SIGNIFICANT CHANGE UP (ref 3.8–10.5)

## 2018-09-04 PROCEDURE — 85027 COMPLETE CBC AUTOMATED: CPT

## 2018-09-04 PROCEDURE — 84439 ASSAY OF FREE THYROXINE: CPT

## 2018-09-04 PROCEDURE — 83036 HEMOGLOBIN GLYCOSYLATED A1C: CPT

## 2018-09-04 PROCEDURE — G0463: CPT

## 2018-09-04 PROCEDURE — 81003 URINALYSIS AUTO W/O SCOPE: CPT

## 2018-09-04 PROCEDURE — 84436 ASSAY OF TOTAL THYROXINE: CPT

## 2018-09-04 RX ORDER — ACETAMINOPHEN 325 MG/1
325 TABLET, FILM COATED ORAL EVERY 6 HOURS
Qty: 56 | Refills: 0 | Status: COMPLETED | COMMUNITY
Start: 2018-04-19 | End: 2018-09-04

## 2018-09-04 RX ORDER — ATENOLOL 50 MG/1
50 TABLET ORAL DAILY
Qty: 30 | Refills: 3 | Status: COMPLETED | COMMUNITY
Start: 2018-04-19 | End: 2018-09-04

## 2018-09-05 LAB
T4 AB SER-ACNC: 9.3 UG/DL — SIGNIFICANT CHANGE UP (ref 4.6–12)
T4 FREE SERPL-MCNC: 1.8 NG/DL — SIGNIFICANT CHANGE UP (ref 0.9–1.8)

## 2018-09-13 ENCOUNTER — OUTPATIENT (OUTPATIENT)
Dept: OUTPATIENT SERVICES | Facility: HOSPITAL | Age: 43
LOS: 1 days | End: 2018-09-13
Payer: SELF-PAY

## 2018-09-13 ENCOUNTER — APPOINTMENT (OUTPATIENT)
Dept: ENDOCRINOLOGY | Facility: HOSPITAL | Age: 43
End: 2018-09-13

## 2018-09-13 VITALS
SYSTOLIC BLOOD PRESSURE: 126 MMHG | WEIGHT: 218 LBS | DIASTOLIC BLOOD PRESSURE: 83 MMHG | HEART RATE: 84 BPM | HEIGHT: 64 IN | BODY MASS INDEX: 37.22 KG/M2

## 2018-09-13 DIAGNOSIS — E34.9 ENDOCRINE DISORDER, UNSPECIFIED: ICD-10-CM

## 2018-09-13 DIAGNOSIS — E03.9 HYPOTHYROIDISM, UNSPECIFIED: ICD-10-CM

## 2018-09-13 DIAGNOSIS — S82.201S UNSPECIFIED FRACTURE OF SHAFT OF RIGHT TIBIA, SEQUELA: ICD-10-CM

## 2018-09-13 DIAGNOSIS — E78.5 HYPERLIPIDEMIA, UNSPECIFIED: ICD-10-CM

## 2018-09-13 DIAGNOSIS — E10.9 TYPE 1 DIABETES MELLITUS WITHOUT COMPLICATIONS: ICD-10-CM

## 2018-09-13 PROCEDURE — G0463: CPT

## 2018-09-18 DIAGNOSIS — R00.0 TACHYCARDIA, UNSPECIFIED: ICD-10-CM

## 2018-10-08 NOTE — REVIEW OF SYSTEMS
[Fever] : no fever [Chills] : no chills [Fatigue] : no fatigue [Chest Pain] : no chest pain [Palpitations] : no palpitations [Shortness Of Breath] : no shortness of breath [Wheezing] : no wheezing [Cough] : no cough [Dyspnea on Exertion] : not dyspnea on exertion [Abdominal Pain] : no abdominal pain [Nausea] : no nausea [Constipation] : no constipation [Vomiting] : no vomiting

## 2018-10-08 NOTE — HISTORY OF PRESENT ILLNESS
[de-identified] : 42M PMH diabetes type I and acquired hypothyroidism presenting for follow up.  Since last visit, pt has seen endocrine and had DEXA done with FSH, LSH and calcium rechecked.  Calcium was within normal limits.  Testosterone was low, however labs were consistent with daily variation and time of draw.  No further workup was needed at this time.  Lantus was readjusted.\par \par # Fracture  \par Pt recovering well from fracture today.  Pt is excited to be going back to work tomorrow.  He is walking around on his leg,no longer having pain.  \par \par # Diabetes\par Patient has been compliant with diabetes medications as prescribed.  Pt now on sliding scale humalog in am and lantus 15 at night, has been using freestyle sensor system to check FSG.  FSG has been well controlled, per pt.\par \par # Tachycardia\par Pt is no longer taking atenolol, however remains tachycardic.  Pt denies sx of palpitations, pain, sob.  Denies fevers, chills, nausea, vomiting.  \par

## 2018-10-08 NOTE — ASSESSMENT
[FreeTextEntry1] : 42M PMH T1DM, Hypothyroid 2/2 surgery present for follow up.  Since last visit, pt has seen endocrine and been worked up for hypogondadism that was neg.  \par \par # Fracture of right leg\par - Pt had repair with Dr. Mascorro\par - Leg with well healed with no further issues.\par \par # T1DM\par - Pt with long standing DM.\par - HbA1c is elevated today from last visit; lantus had been adjusted 2/2 to am hypoglycemia\par - Pt should revisit Dr. Manley for further f/u re: higher HbA1c.  Will reach out today.\par - Urine micro albumin checked today.\par \par # Tachycardia\par - Pt with persistent tachycardia, DDX includes PE, bleeding, pain in post-op period.  Pt had neg scan for PE in hospital and currently not having s/s of PE.  CBC was lower than prior to admission in March\par - Repeat CBC today\par - Repeat TSH today\par - EKG done today showing sinus rhythm, HR 73 at time of EKG.\par - Atenolol has been stopped, will not restart.\par \par # Hyperlipidemia\par - c/w simvastatin\par \par # Hypothyroid\par - Pt with history of hypothyroid, on synthroid.\par - c/w 150 mcg dose\par - will recheck tsh today.\par \par # HCM\par - Flu shot taken per patient in Oct 2017\par - Tetanus given 4/19/18\par - Pt to bring in vaccination charts next time\par - Pt will see dentist in 90 days.\par - HIV neg\par - RTC in 5 weeks to f/u tachycardia\par \par Discussed with Dr. Sullivan

## 2018-10-08 NOTE — PHYSICAL EXAM
[No Acute Distress] : no acute distress [Well Nourished] : well nourished [Well Developed] : well developed [Well-Appearing] : well-appearing [No Respiratory Distress] : no respiratory distress  [Clear to Auscultation] : lungs were clear to auscultation bilaterally [No Accessory Muscle Use] : no accessory muscle use [Regular Rhythm] : with a regular rhythm [Normal S1, S2] : normal S1 and S2 [No Murmur] : no murmur heard [No Edema] : there was no peripheral edema [Soft] : abdomen soft [Non Tender] : non-tender [Non-distended] : non-distended [No Masses] : no abdominal mass palpated [Normal Bowel Sounds] : normal bowel sounds [Normal Posterior Cervical Nodes] : no posterior cervical lymphadenopathy [Normal Anterior Cervical Nodes] : no anterior cervical lymphadenopathy [Speech Grossly Normal] : speech grossly normal [Normal Affect] : the affect was normal [Alert and Oriented x3] : oriented to person, place, and time [Normal Mood] : the mood was normal [Normal Insight/Judgement] : insight and judgment were intact [de-identified] : tachycardic

## 2018-10-08 NOTE — HEALTH RISK ASSESSMENT
[No falls in past year] : Patient reported no falls in the past year [0] : 2) Feeling down, depressed, or hopeless: Not at all (0) [] : No [de-identified] : Endocrinology

## 2018-10-09 ENCOUNTER — APPOINTMENT (OUTPATIENT)
Dept: INTERNAL MEDICINE | Facility: CLINIC | Age: 43
End: 2018-10-09

## 2018-10-09 ENCOUNTER — OUTPATIENT (OUTPATIENT)
Dept: OUTPATIENT SERVICES | Facility: HOSPITAL | Age: 43
LOS: 1 days | End: 2018-10-09
Payer: SELF-PAY

## 2018-10-09 VITALS
WEIGHT: 219 LBS | HEART RATE: 61 BPM | HEIGHT: 64 IN | DIASTOLIC BLOOD PRESSURE: 72 MMHG | OXYGEN SATURATION: 96 % | SYSTOLIC BLOOD PRESSURE: 158 MMHG | BODY MASS INDEX: 37.39 KG/M2

## 2018-10-09 VITALS — SYSTOLIC BLOOD PRESSURE: 125 MMHG | DIASTOLIC BLOOD PRESSURE: 85 MMHG

## 2018-10-09 DIAGNOSIS — I10 ESSENTIAL (PRIMARY) HYPERTENSION: ICD-10-CM

## 2018-10-09 PROCEDURE — 81003 URINALYSIS AUTO W/O SCOPE: CPT

## 2018-10-09 NOTE — PHYSICAL EXAM
[No Acute Distress] : no acute distress [Well Nourished] : well nourished [Well Developed] : well developed [Well-Appearing] : well-appearing [No Respiratory Distress] : no respiratory distress  [Clear to Auscultation] : lungs were clear to auscultation bilaterally [No Accessory Muscle Use] : no accessory muscle use [Normal Rate] : normal rate  [Regular Rhythm] : with a regular rhythm [Normal S1, S2] : normal S1 and S2 [No Murmur] : no murmur heard [No Edema] : there was no peripheral edema [Soft] : abdomen soft [Non Tender] : non-tender [Non-distended] : non-distended [No Masses] : no abdominal mass palpated

## 2018-10-10 LAB
APPEARANCE: CLEAR
BILIRUBIN URINE: NEGATIVE
BLOOD URINE: NEGATIVE
COLOR: YELLOW
GLUCOSE QUALITATIVE U: NEGATIVE MG/DL
KETONES URINE: NEGATIVE
LEUKOCYTE ESTERASE URINE: NEGATIVE
NITRITE URINE: NEGATIVE
PH URINE: 7.5
PROTEIN URINE: NEGATIVE MG/DL
SPECIFIC GRAVITY URINE: 1.02
UROBILINOGEN URINE: 1 MG/DL

## 2018-10-11 LAB
ALBUMIN: 10
CREATININE: 100
MICROALBUMIN/CREAT UR TEST STR-RTO: <30

## 2018-10-12 NOTE — REVIEW OF SYSTEMS
[Nocturia] : nocturia [Fever] : no fever [Chills] : no chills [Fatigue] : no fatigue [Chest Pain] : no chest pain [Palpitations] : no palpitations [Lower Ext Edema] : no lower extremity edema [Shortness Of Breath] : no shortness of breath [Wheezing] : no wheezing [Cough] : no cough [Dyspnea on Exertion] : not dyspnea on exertion [Abdominal Pain] : no abdominal pain [Nausea] : no nausea [Vomiting] : no vomiting [Dysuria] : no dysuria [Incontinence] : no incontinence [Hesitancy] : no hesitancy [Hematuria] : no hematuria [Frequency] : no frequency [Anxiety] : no anxiety [Depression] : no depression

## 2018-10-12 NOTE — END OF VISIT
[] : Resident [FreeTextEntry3] : Nocturia:  Reports longstanding nocturia, previously able to wake up in the middle of the night but recently finds himself in AM after bedwetting. UA no evidence of infection, no glucosuria. Denies drinking/eating before bedtime, denies polyuria during the day. Other possible differential dx include low volume bladder void vs. obesity vs. MERARI? (denies snoring at night but STOP-BANG score at least 3 at this time). At next visit, would discuss further about weight loss, lifestyle changes, consider US bladder and Urology referral if persistent.

## 2018-10-12 NOTE — HISTORY OF PRESENT ILLNESS
[de-identified] : 42M PMH diabetes type I and acquired hypothyroidism presenting for follow up.  Since last visit, pt has seen endocrine and had Milly data reviewed.  \par \par # Diabetes\par Patient has been compliant with diabetes medications as prescribed.  Pt now on sliding scale humalog in am and lantus 15 at night, has been using freestyle sensor system to check FSG.  FSG has been well controlled -- between 69- 280s\par \par # Noctururia\par Pt states he has recently been wetting himself at night.  This happens every night.  He states he usually wakes up and will go to the bathroom at least once per night.  However, since the surgery, pt states he has been waking up after urinating on the bed without him noticing.  Pt denies drinking more fluids at night.  Denies having increased frequency during the day.  Pt states he does often noticed he has to go to the bathroom at night more when his sugars have not been controlled, however this time he is more worried because he is not noticing himself urinate.  Pt denies burning with urination.  Pt is mostly concerned as this is embarassing and worrisome.  Pt states this has been an issue since his surgery.\par \par # Tachycardia\par Pt had tachycardia after his surgery.  He denies palpitations.  Pt is no longer having tachycardia.\par \par # HCM   \par Pt had flu shot in Sept at [facility].

## 2018-10-12 NOTE — ASSESSMENT
[FreeTextEntry1] : 42M PMH T1DM, Hypothyroid 2/2 surgery present for follow up.  Since last visit, pt has seen endocrine and been worked up for hypogondadism that was neg.  Pt not presenting for followup.\par \par # Nocturia\par Pt complaining of bed wetting today.  He has had nocturia in the past, related to diabetes, however this time it is more bothersome as pt does not notice he is urinating until his bed is wet.  Pt does not have any symptoms of prostatic hypertrophy.  He denies sx of UTI (no dysuria).  Likely 2/2 to diabetes.\par - will obtain UA today to examine amount of glucose in urine, r/o worsening dm\par - Urine micro does not show evidence of proteinuria\par - Pt does not have signs/sx of MERARI or hypersomnlence (no daytime sleepiness, no snoring), however will consider this if this continues\par \par # BP\par BP was recorded at 158/72 at time of presentaiton, rechecked and found to e 125/85.  Likely 2/2 to anxiety.\par \par # Fracture of right leg\par - well healed no further issues.\par \par # T1DM\par - Pt with long standing DM.\par - Appreciate Dr. Manley's input.\par - c/w levemir and humalog\par \par # Tachycardia\par - Pt no longer having tachycardia.\par \par # Hyperlipidemia\par - c/w simvastatin\par \par # Hypothyroid\par - c/w 150 mcg dose\par \par # HCM\par - Flu shot taken per patient in Sept 2018\par - Tetanus given 4/19/18\par - Pt will see dentist next Tuesday\par - HIV neg\par - Optho referral given today\par - RTC in 3-4 months for DM check\par \par Discussed with Dr. Bruce

## 2018-10-15 ENCOUNTER — APPOINTMENT (OUTPATIENT)
Age: 43
End: 2018-10-15

## 2018-10-15 ENCOUNTER — OUTPATIENT (OUTPATIENT)
Dept: OUTPATIENT SERVICES | Facility: HOSPITAL | Age: 43
LOS: 1 days | End: 2018-10-15

## 2018-10-17 DIAGNOSIS — E10.9 TYPE 1 DIABETES MELLITUS WITHOUT COMPLICATIONS: ICD-10-CM

## 2018-10-17 DIAGNOSIS — R35.1 NOCTURIA: ICD-10-CM

## 2018-10-22 ENCOUNTER — APPOINTMENT (OUTPATIENT)
Dept: OPHTHALMOLOGY | Facility: CLINIC | Age: 43
End: 2018-10-22

## 2018-11-15 ENCOUNTER — APPOINTMENT (OUTPATIENT)
Dept: OPHTHALMOLOGY | Facility: CLINIC | Age: 43
End: 2018-11-15

## 2018-11-15 ENCOUNTER — OUTPATIENT (OUTPATIENT)
Dept: OUTPATIENT SERVICES | Facility: HOSPITAL | Age: 43
LOS: 1 days | End: 2018-11-15

## 2018-12-06 ENCOUNTER — OUTPATIENT (OUTPATIENT)
Dept: OUTPATIENT SERVICES | Facility: HOSPITAL | Age: 43
LOS: 1 days | End: 2018-12-06
Payer: SELF-PAY

## 2018-12-06 ENCOUNTER — APPOINTMENT (OUTPATIENT)
Dept: ENDOCRINOLOGY | Facility: HOSPITAL | Age: 43
End: 2018-12-06

## 2018-12-06 VITALS
HEIGHT: 64 IN | DIASTOLIC BLOOD PRESSURE: 79 MMHG | RESPIRATION RATE: 14 BRPM | BODY MASS INDEX: 37.22 KG/M2 | HEART RATE: 101 BPM | SYSTOLIC BLOOD PRESSURE: 130 MMHG | WEIGHT: 218 LBS

## 2018-12-06 DIAGNOSIS — E34.9 ENDOCRINE DISORDER, UNSPECIFIED: ICD-10-CM

## 2018-12-06 DIAGNOSIS — E03.9 HYPOTHYROIDISM, UNSPECIFIED: ICD-10-CM

## 2018-12-06 DIAGNOSIS — E10.9 TYPE 1 DIABETES MELLITUS WITHOUT COMPLICATIONS: ICD-10-CM

## 2018-12-06 DIAGNOSIS — E78.5 HYPERLIPIDEMIA, UNSPECIFIED: ICD-10-CM

## 2018-12-06 LAB
GLUCOSE BLDC GLUCOMTR-MCNC: 71
GLUCOSE BLDC GLUCOMTR-MCNC: 71 MG/DL — SIGNIFICANT CHANGE UP (ref 70–99)
HBA1C BLD-MCNC: 7.9 % — HIGH (ref 4–5.6)

## 2018-12-06 PROCEDURE — 82962 GLUCOSE BLOOD TEST: CPT

## 2018-12-06 PROCEDURE — G0463: CPT

## 2018-12-06 PROCEDURE — 83036 HEMOGLOBIN GLYCOSYLATED A1C: CPT

## 2019-01-30 DIAGNOSIS — H47.399 OTHER DISORDERS OF OPTIC DISC, UNSPECIFIED EYE: ICD-10-CM

## 2019-02-07 ENCOUNTER — RESULT CHARGE (OUTPATIENT)
Age: 44
End: 2019-02-07

## 2019-02-07 ENCOUNTER — OUTPATIENT (OUTPATIENT)
Dept: OUTPATIENT SERVICES | Facility: HOSPITAL | Age: 44
LOS: 1 days | End: 2019-02-07
Payer: SELF-PAY

## 2019-02-07 ENCOUNTER — APPOINTMENT (OUTPATIENT)
Dept: ENDOCRINOLOGY | Facility: HOSPITAL | Age: 44
End: 2019-02-07

## 2019-02-07 VITALS
SYSTOLIC BLOOD PRESSURE: 121 MMHG | BODY MASS INDEX: 36.88 KG/M2 | WEIGHT: 216 LBS | HEIGHT: 64 IN | DIASTOLIC BLOOD PRESSURE: 76 MMHG | HEART RATE: 108 BPM

## 2019-02-07 DIAGNOSIS — E06.9 THYROIDITIS, UNSPECIFIED: ICD-10-CM

## 2019-02-07 LAB
GLUCOSE BLDC GLUCOMTR-MCNC: 146 MG/DL — HIGH (ref 70–99)
HBA1C BLD-MCNC: 8.2 % — HIGH (ref 4–5.6)

## 2019-02-07 PROCEDURE — 84439 ASSAY OF FREE THYROXINE: CPT

## 2019-02-07 PROCEDURE — 84443 ASSAY THYROID STIM HORMONE: CPT

## 2019-02-07 PROCEDURE — G0463: CPT

## 2019-02-07 PROCEDURE — 82962 GLUCOSE BLOOD TEST: CPT

## 2019-02-07 PROCEDURE — 83036 HEMOGLOBIN GLYCOSYLATED A1C: CPT

## 2019-02-07 NOTE — END OF VISIT
[] : Fellow [FreeTextEntry3] : explained to pt at length we need better DM control \par pt has been taking insulin at the time of the meal, likely explains the post prandial high \par asked pt to take short actin insulin 15 min before the start of the meal to avoid the high \par \par Pt to discuss glucose log and roel log in 1 week \par to call us with highs or lows \par also discussed to mix anerbic (weights) with aerobic exercise, also d/w to check blood glucose before exercise and take 15gram snack if <100 before exercise.\par \par \par also noted to be slightly tachycardic, check TFTS if continuesm pt will need to be evlauted by cards for a monitor\par rule out hyperthyroidism \par short term followup in 4 weeks \par \par

## 2019-02-07 NOTE — HISTORY OF PRESENT ILLNESS
[FreeTextEntry1] : 42 y/o M PMH uncontrolled DM1, hypothyroidism, history of non-traumatic fracture of right tibia/fibula here for follow up of DM1. Patient was seen by me in March 2018 at [facility] when he was admitted with right leg fracture. \par \par 1. DM1:Last HbA1c 7.9% in December 2018.\par He was diagnosed with DM1 at age 8. Saw optho in November/December 2018 and does not have retinopathy. No history of neuropathy or nephropathy. He is currently on Levemir 15 units qhs, Humalog as per sliding scale (FS  - 4/5/5 units, with 1:50 correction for FS above 150). No longer checks FS with glucometer as he is paying out of pocket for Milly.  Average blood glucose between 12 AM and 6 , 6 am and 12 pm is 161, average blood glucose between 12 pm and 6 pm is 238, average blood glucose between 6 pm and 12 am is 153. Has hypoglycemia multiple times a week, most often after breakfast and during episodes of exercise. For breakfast: sandwich and coffee, will have a piece of fruit 1 hour after breakfast. Lunch: sandwich. Dinner: a little bit of rice and chicken. Drinks sugar free juice. Always takes his insulin. However, he takes his Humalog about 5 minutes before eating. Milly data reviewed here in the office today - he has hyperglycemic excursion followed by hypoglycemia. There is large variability in BG values.\par \par 2. Hypothyroidism: Takes 150 mcg of LT4 daily. Diagnosed several years ago, takes LT4 on an empty stomach. No neck complaints. No chest pain, palpitations, abdominal pain, nausea, vomiting, diarrhea, constipation. No heat or cold intolerance. No skin or hair changes. Weight is overall stable.\par \par 3. HLD: takes Zocor 20 mg daily. Last LDL 63 in April 2018.\par \par 4. Right tibial and fibula fracture: No history of previous fractures. Fracture occurred as patient was walking - he felt weak, legs gave out, and he fell. Now s/p surgery with ortho in March 2018. No longer ambulates with a cane. BMD July 2018: L spine -0.9, left femoral neck -0.9, left total hip -1.1. Work up for fracture negative - vitamin D levels normal, calcium normal, TFTs normal; testosterone noted to be mildly low previously with normal LH and FSH, however testosterone was not drawn in the AM. Takes a vitamin D supplement daily.

## 2019-02-07 NOTE — PHYSICAL EXAM
[Alert] : alert [No Acute Distress] : no acute distress [Normal Sclera/Conjunctiva] : normal sclera/conjunctiva [No Proptosis] : no proptosis [No Neck Mass] : no neck mass was observed [Supple] : the neck was supple [Thyroid Not Enlarged] : the thyroid was not enlarged [No Thyroid Nodules] : there were no palpable thyroid nodules [No Respiratory Distress] : no respiratory distress [Normal Rate and Effort] : normal respiratory rhythm and effort [No Accessory Muscle Use] : no accessory muscle use [Clear to Auscultation] : lungs were clear to auscultation bilaterally [Normal Rate] : heart rate was normal  [Normal S1, S2] : normal S1 and S2 [Regular Rhythm] : with a regular rhythm [No Edema] : there was no peripheral edema [Normal Bowel Sounds] : normal bowel sounds [Not Tender] : non-tender [Soft] : abdomen soft [Not Distended] : not distended [No Masses] : no abdominal mass palpated [Normal Gait] : normal gait [No Clubbing, Cyanosis] : no clubbing  or cyanosis of the fingernails [Normal Strength/Tone] : muscle strength and tone were normal [No Motor Deficits] : the motor exam was normal [No Tremors] : no tremors [Oriented x3] : oriented to person, place, and time [Normal Insight/Judgement] : insight and judgment were intact [Normal Affect] : the affect was normal [Normal Mood] : the mood was normal [de-identified] : full foot exam deferred to next visit

## 2019-02-07 NOTE — REVIEW OF SYSTEMS
[All other systems negative] : All other systems negative [Recent Weight Gain (___ Lbs)] : no recent weight gain [Recent Weight Loss (___ Lbs)] : no recent weight loss [Fever] : no fever [Chills] : no chills [Blurry Vision] : no blurred vision [Dysphagia] : no dysphagia [Dysphonia] : no dysphonia [Neck Pain] : no neck pain [Chest Pain] : no chest pain [Palpitations] : no palpitations [Lower Ext Edema] : no lower extremity edema [Shortness Of Breath] : no shortness of breath [Cough] : no cough [Nausea] : no nausea [Vomiting] : no vomiting was observed [Constipation] : no constipation [Diarrhea] : no diarrhea [Abdominal Pain] : no abdominal pain [Polyuria] : no polyuria [Poor Balance] : steady state balance [Difficulty Walking] : no difficulty walking [Polydipsia] : no polydipsia [Cold Intolerance] : cold tolerant [Heat Intolerance] : heat tolerant

## 2019-02-07 NOTE — ASSESSMENT
[Carbohydrate Consistent Diet] : carbohydrate consistent diet [Importance of Diet and Exercise] : importance of diet and exercise to improve glycemic control, achieve weight loss and improve cardiovascular health [FreeTextEntry1] : 43 year old man with uncontrolled DM1, hypothyroidism and hyperlipidemia\par \par 1. DM1, uncontrolled:\par - CGM data reviewed with patient - he has hyperglycemic excursions followed by hypoglycemia, which can be due to inappropriate timing of pre-meal insulin with food intake. He has been instructed to take his Humalog 15 minutes prior to food intake, so that onset of action of Humalog is appropriately timed with food intake\par - given hypoglycemia after breakfast, will decrease breakfast Humalog to 3 units\par - continue with 5 units of Humalog before lunch and dinner for now\par - continue with Levemir 15 units qhs - suspect he needs a higher dose, but deferr increasing dose for now given daytime hypoglycemia \par - continue with low correction scale qac and qhs\par - will have patient go to our private office at [address] [place] [address] for Milly data download so that further changes can be made to his insulin regimen\par - he is up to date with retinopathy screening\par - urine protein/creatinine negative in June 2018, will repeat urine microalbumin/creatinine in several months\par - he has been counseled today on appropriate treatment of hypoglycemia (requires 15 grams of fast acting carbs, check BG 15 minutes later)\par \par 2. Hypothyroidism: appears clinically euthyroid. Will recheck TSH and free T4 today. Continue with LT4 150 mcg daily pending results of TFTs\par \par 3. Hyperlipidemia: Last LDL 63 in April 2018. Continue with Zocor 20 mg daily. Repeat lipid panel at next visit. \par \par Return visit in 6 weeks for close follow up

## 2019-02-08 LAB
GLUCOSE BLDC GLUCOMTR-MCNC: 146
T4 FREE SERPL-MCNC: 1.6 NG/DL — SIGNIFICANT CHANGE UP (ref 0.9–1.8)
TSH SERPL-MCNC: 2.51 UIU/ML — SIGNIFICANT CHANGE UP (ref 0.27–4.2)

## 2019-02-11 DIAGNOSIS — E10.9 TYPE 1 DIABETES MELLITUS WITHOUT COMPLICATIONS: ICD-10-CM

## 2019-02-11 DIAGNOSIS — E78.5 HYPERLIPIDEMIA, UNSPECIFIED: ICD-10-CM

## 2019-02-11 DIAGNOSIS — E03.9 HYPOTHYROIDISM, UNSPECIFIED: ICD-10-CM

## 2019-02-25 DIAGNOSIS — E11.3399 TYPE 2 DIABETES MELLITUS WITH MODERATE NONPROLIFERATIVE DIABETIC RETINOPATHY WITHOUT MACULAR EDEMA, UNSPECIFIED EYE: ICD-10-CM

## 2019-02-28 ENCOUNTER — APPOINTMENT (OUTPATIENT)
Dept: INTERNAL MEDICINE | Facility: CLINIC | Age: 44
End: 2019-02-28

## 2019-03-05 ENCOUNTER — FORM ENCOUNTER (OUTPATIENT)
Age: 44
End: 2019-03-05

## 2019-03-06 ENCOUNTER — OUTPATIENT (OUTPATIENT)
Dept: OUTPATIENT SERVICES | Facility: HOSPITAL | Age: 44
LOS: 1 days | End: 2019-03-06
Payer: SELF-PAY

## 2019-03-06 ENCOUNTER — APPOINTMENT (OUTPATIENT)
Dept: ORTHOPEDIC SURGERY | Facility: HOSPITAL | Age: 44
End: 2019-03-06

## 2019-03-06 VITALS
HEIGHT: 64 IN | BODY MASS INDEX: 36.88 KG/M2 | HEART RATE: 91 BPM | DIASTOLIC BLOOD PRESSURE: 78 MMHG | WEIGHT: 216 LBS | SYSTOLIC BLOOD PRESSURE: 131 MMHG

## 2019-03-06 DIAGNOSIS — M79.643 PAIN IN UNSPECIFIED HAND: ICD-10-CM

## 2019-03-06 PROCEDURE — 73590 X-RAY EXAM OF LOWER LEG: CPT | Mod: 26,RT

## 2019-03-06 PROCEDURE — G0463: CPT

## 2019-03-06 PROCEDURE — 73590 X-RAY EXAM OF LOWER LEG: CPT

## 2019-03-07 DIAGNOSIS — S82.201S UNSPECIFIED FRACTURE OF SHAFT OF RIGHT TIBIA, SEQUELA: ICD-10-CM

## 2019-03-21 ENCOUNTER — APPOINTMENT (OUTPATIENT)
Dept: ENDOCRINOLOGY | Facility: HOSPITAL | Age: 44
End: 2019-03-21

## 2019-03-21 ENCOUNTER — OUTPATIENT (OUTPATIENT)
Dept: OUTPATIENT SERVICES | Facility: HOSPITAL | Age: 44
LOS: 1 days | End: 2019-03-21
Payer: SELF-PAY

## 2019-03-21 VITALS
RESPIRATION RATE: 14 BRPM | BODY MASS INDEX: 36.7 KG/M2 | SYSTOLIC BLOOD PRESSURE: 123 MMHG | HEIGHT: 64 IN | WEIGHT: 215 LBS | DIASTOLIC BLOOD PRESSURE: 74 MMHG | HEART RATE: 93 BPM

## 2019-03-21 DIAGNOSIS — E06.9 THYROIDITIS, UNSPECIFIED: ICD-10-CM

## 2019-03-21 LAB
CHOLEST SERPL-MCNC: 161 MG/DL — SIGNIFICANT CHANGE UP (ref 10–199)
GLUCOSE BLDC GLUCOMTR-MCNC: 232
GLUCOSE BLDC GLUCOMTR-MCNC: 232 MG/DL — HIGH (ref 70–99)
HBA1C BLD-MCNC: 8.4 % — HIGH (ref 4–5.6)
HDLC SERPL-MCNC: 72 MG/DL — SIGNIFICANT CHANGE UP
LIPID PNL WITH DIRECT LDL SERPL: 77 MG/DL — SIGNIFICANT CHANGE UP
TOTAL CHOLESTEROL/HDL RATIO MEASUREMENT: 2.2 RATIO — LOW (ref 3.4–9.6)
TRIGL SERPL-MCNC: 61 MG/DL — SIGNIFICANT CHANGE UP (ref 10–149)

## 2019-03-21 PROCEDURE — 82962 GLUCOSE BLOOD TEST: CPT

## 2019-03-21 PROCEDURE — 83036 HEMOGLOBIN GLYCOSYLATED A1C: CPT

## 2019-03-21 PROCEDURE — 80061 LIPID PANEL: CPT

## 2019-03-21 PROCEDURE — G0463: CPT

## 2019-03-22 DIAGNOSIS — E03.9 HYPOTHYROIDISM, UNSPECIFIED: ICD-10-CM

## 2019-03-22 DIAGNOSIS — E78.5 HYPERLIPIDEMIA, UNSPECIFIED: ICD-10-CM

## 2019-03-22 DIAGNOSIS — E10.9 TYPE 1 DIABETES MELLITUS WITHOUT COMPLICATIONS: ICD-10-CM

## 2019-03-22 NOTE — REVIEW OF SYSTEMS
[Fatigue] : no fatigue [Fever] : no fever [Chills] : no chills [Blurry Vision] : no blurred vision [Dysphagia] : no dysphagia [Dysphonia] : no dysphonia [Neck Pain] : no neck pain [Chest Pain] : no chest pain [Palpitations] : no palpitations [Lower Ext Edema] : no lower extremity edema [Shortness Of Breath] : no shortness of breath [Cough] : no cough [Nausea] : no nausea [Vomiting] : no vomiting was observed [Constipation] : no constipation [Diarrhea] : no diarrhea [Abdominal Pain] : no abdominal pain [Polyuria] : no polyuria [Hair Loss] : no hair loss [Dry Skin] : no dry skin [Tremors] : no tremors [Polydipsia] : no polydipsia [Cold Intolerance] : cold tolerant [Heat Intolerance] : heat tolerant [All other systems negative] : All other systems negative

## 2019-03-22 NOTE — PHYSICAL EXAM
[Alert] : alert [No Acute Distress] : no acute distress [Normal Sclera/Conjunctiva] : normal sclera/conjunctiva [No Proptosis] : no proptosis [No Neck Mass] : no neck mass was observed [Supple] : the neck was supple [Thyroid Not Enlarged] : the thyroid was not enlarged [No Respiratory Distress] : no respiratory distress [Normal Rate and Effort] : normal respiratory rhythm and effort [No Accessory Muscle Use] : no accessory muscle use [Clear to Auscultation] : lungs were clear to auscultation bilaterally [Normal S1, S2] : normal S1 and S2 [Regular Rhythm] : with a regular rhythm [Pedal Pulses Normal] : the pedal pulses are present [Normal Bowel Sounds] : normal bowel sounds [No Edema] : there was no peripheral edema [Not Tender] : non-tender [Not Distended] : not distended [Soft] : abdomen soft [No Masses] : no abdominal mass palpated [Normal Gait] : normal gait [No Clubbing, Cyanosis] : no clubbing  or cyanosis of the fingernails [Normal Strength/Tone] : muscle strength and tone were normal [No Motor Deficits] : the motor exam was normal [No Tremors] : no tremors [Oriented x3] : oriented to person, place, and time [Normal Affect] : the affect was normal [Normal Insight/Judgement] : insight and judgment were intact [Normal Mood] : the mood was normal [Foot Ulcers] : no foot ulcers [Abdominal Striae] : no abdominal striae [Acanthosis Nigricans] : no acanthosis nigricans

## 2019-03-22 NOTE — ASSESSMENT
[Carbohydrate Consistent Diet] : carbohydrate consistent diet [Hypoglycemia Management] : hypoglycemia management [Importance of Diet and Exercise] : importance of diet and exercise to improve glycemic control, achieve weight loss and improve cardiovascular health [FreeTextEntry1] : 43 year old man with uncontrolled DM1, hypothyroidism, and HLD\par \par 1. DM1, uncontrolled: BG labile and difficult to control\par - Milly data was reviewed with patient - discussed with him that AM BG likely high because bedtime BG low\par - appears to have hyperglycemia with lunch and dinner\par - will c/w Humalog 3 units for breakfast but increase Humalog to 6 with lunch and 5 with dinner\par - will adjust correction scale to 1:100 starting at FS of 151 and start Humalog correction scale 1:100 at bedtime as well\par - decrease Levemir to 18 units qhs (he should not require as much Levemir if BG at bedtime falls)\par - up to date with retinopathy screening\par - does not have nephropathy\par - reviewed appropriate treatment of hypoglycemia with patient (1/2 cup of juice)\par \par 2. Hypothyroidism: clinically and biochemically euthyroid. C/w LT4 150 mcg daily\par \par 3. HLD: Check lipid panel today. C/w Zocor 20 mg qhs\par \par Return visit in 3 months. Patient instructed to go to endocrine office at [address] [place] [address] in 1 week for Milly download

## 2019-03-22 NOTE — HISTORY OF PRESENT ILLNESS
[FreeTextEntry1] : 44 y/o M PMH uncontrolled DM1, hypothyroidism, history of non-traumatic fracture of right tibia/fibula here for follow up of DM1. Patient was seen by me in March 2018 at [facility] when he was admitted with right leg fracture. \par \par 1. DM1:Last HbA1c 8.2% in February 2019. \par He was diagnosed with DM1 at age 8. Saw optho in December 2018 and does not have retinopathy. No history of neuropathy or nephropathy. He is currently on Levemir 23 units qhs, Humalog as per sliding scale (FS  - 3/5/4 units, with 1:50 correction for FS above 150). No longer checks FS with glucometer as he is paying out of pocket for Milly. Average blood glucose between 12 AM and 6 , 6 am and 12 pm is 182, average blood glucose between 12 pm and 6 pm is 227, average blood glucose between 6 pm and 12 am is 200. Has hypoglycemia multiple times a week, mostly after episodes of hyperglycemia. He is now taking his Humalog 15 minutes before he eats. He states that he has been slowly increasing his dose of Levemir by 1 unit every 3 days as his BG is high in the AM.  Eats three regular meals a day - breakfast is usually at 8-9 am, lunch in the early afternoon, and dinner around 6 pm or so.  Always takes his insulin. Milly data reviewed here in the office today - hypoglycemia most often occurs after an episode of hyperglycemia and sometimes after taking full dose Humalog with a meal and not eating as much. There is large variability in BG values. He has hyperglycemia at bedtime but BG does drop overnight based on CGM data. To treat hypoglycemia, patient will drink half a cup of juice, eats raisons, glucose tabs and administer glucagon as well. \par \par 2. Hypothyroidism: Takes 150 mcg of LT4 daily. No neck complaints (dysphagia, dysphonia, neck pain). No chest pain, palpitations, abdominal pain, nausea, vomiting, diarrhea, constipation. No heat or cold intolerance. No skin or hair changes. Weight is overall stable. TSH 2.51 with free T4 of 1.6 in Feb 2019.\par \par 3. HLD: takes Zocor 20 mg daily. Last LDL 63 in April 2018.\par \par 4. Right tibial and fibula fracture: No history of previous fractures. Fracture occurred as patient was walking - he felt weak, legs gave out, and he fell. Now s/p surgery with ortho in March 2018. No longer ambulates with a cane. BMD July 2018: L spine -0.9, left femoral neck -0.9, left total hip -1.1. Work up for fracture negative - vitamin D levels normal, calcium normal, TFTs normal; testosterone noted to be mildly low previously with normal LH and FSH, however testosterone was not drawn in the AM. Takes a vitamin D supplement daily - takes 1000 units daily. Does not take a calcium supplement.\par

## 2019-04-17 ENCOUNTER — LABORATORY RESULT (OUTPATIENT)
Age: 44
End: 2019-04-17

## 2019-04-18 ENCOUNTER — APPOINTMENT (OUTPATIENT)
Dept: INTERNAL MEDICINE | Facility: CLINIC | Age: 44
End: 2019-04-18

## 2019-04-18 ENCOUNTER — OUTPATIENT (OUTPATIENT)
Dept: OUTPATIENT SERVICES | Facility: HOSPITAL | Age: 44
LOS: 1 days | End: 2019-04-18
Payer: SELF-PAY

## 2019-04-18 VITALS
BODY MASS INDEX: 36.37 KG/M2 | WEIGHT: 213 LBS | HEIGHT: 64 IN | OXYGEN SATURATION: 96 % | DIASTOLIC BLOOD PRESSURE: 72 MMHG | HEART RATE: 97 BPM | SYSTOLIC BLOOD PRESSURE: 118 MMHG

## 2019-04-18 DIAGNOSIS — I10 ESSENTIAL (PRIMARY) HYPERTENSION: ICD-10-CM

## 2019-04-18 DIAGNOSIS — E78.5 HYPERLIPIDEMIA, UNSPECIFIED: ICD-10-CM

## 2019-04-18 DIAGNOSIS — E03.9 HYPOTHYROIDISM, UNSPECIFIED: ICD-10-CM

## 2019-04-18 PROCEDURE — 85027 COMPLETE CBC AUTOMATED: CPT

## 2019-04-18 PROCEDURE — 80053 COMPREHEN METABOLIC PANEL: CPT

## 2019-04-18 PROCEDURE — 84443 ASSAY THYROID STIM HORMONE: CPT

## 2019-04-18 PROCEDURE — G0463: CPT

## 2019-04-19 LAB
ALBUMIN SERPL ELPH-MCNC: 4.3 G/DL — SIGNIFICANT CHANGE UP (ref 3.3–5)
ALP SERPL-CCNC: 84 U/L — SIGNIFICANT CHANGE UP (ref 40–120)
ALT FLD-CCNC: 22 U/L — SIGNIFICANT CHANGE UP (ref 10–45)
ANION GAP SERPL CALC-SCNC: 12 MMOL/L — SIGNIFICANT CHANGE UP (ref 5–17)
AST SERPL-CCNC: 20 U/L — SIGNIFICANT CHANGE UP (ref 10–40)
BILIRUB SERPL-MCNC: 0.3 MG/DL — SIGNIFICANT CHANGE UP (ref 0.2–1.2)
BUN SERPL-MCNC: 20 MG/DL — SIGNIFICANT CHANGE UP (ref 7–23)
CALCIUM SERPL-MCNC: 9.3 MG/DL — SIGNIFICANT CHANGE UP (ref 8.4–10.5)
CHLORIDE SERPL-SCNC: 99 MMOL/L — SIGNIFICANT CHANGE UP (ref 96–108)
CO2 SERPL-SCNC: 28 MMOL/L — SIGNIFICANT CHANGE UP (ref 22–31)
CREAT SERPL-MCNC: 0.7 MG/DL — SIGNIFICANT CHANGE UP (ref 0.5–1.3)
GLUCOSE SERPL-MCNC: 241 MG/DL — HIGH (ref 70–99)
HCT VFR BLD CALC: 43.9 % — SIGNIFICANT CHANGE UP (ref 39–50)
HGB BLD-MCNC: 13.8 G/DL — SIGNIFICANT CHANGE UP (ref 13–17)
MCHC RBC-ENTMCNC: 29.4 PG — SIGNIFICANT CHANGE UP (ref 27–34)
MCHC RBC-ENTMCNC: 31.4 GM/DL — LOW (ref 32–36)
MCV RBC AUTO: 93.4 FL — SIGNIFICANT CHANGE UP (ref 80–100)
PLATELET # BLD AUTO: 451 K/UL — HIGH (ref 150–400)
POTASSIUM SERPL-MCNC: 5.7 MMOL/L — HIGH (ref 3.5–5.3)
POTASSIUM SERPL-SCNC: 5.7 MMOL/L — HIGH (ref 3.5–5.3)
PROT SERPL-MCNC: 7.1 G/DL — SIGNIFICANT CHANGE UP (ref 6–8.3)
RBC # BLD: 4.7 M/UL — SIGNIFICANT CHANGE UP (ref 4.2–5.8)
RBC # FLD: 13.1 % — SIGNIFICANT CHANGE UP (ref 10.3–14.5)
SODIUM SERPL-SCNC: 139 MMOL/L — SIGNIFICANT CHANGE UP (ref 135–145)
TSH SERPL-MCNC: 1.12 UIU/ML — SIGNIFICANT CHANGE UP (ref 0.27–4.2)
WBC # BLD: 10 K/UL — SIGNIFICANT CHANGE UP (ref 3.8–10.5)
WBC # FLD AUTO: 10 K/UL — SIGNIFICANT CHANGE UP (ref 3.8–10.5)

## 2019-04-19 NOTE — HISTORY OF PRESENT ILLNESS
[de-identified] : This is a 43 year old male with hx of T1DM, nocturnal incontinence and acquired hypothyroidism 2/2 surgery who presents for follow up. Patient presenting with no clear complaints and actually wanted a full physical instead. \par \par In terms of his T1DM: on levemir 18 QHS, Humalog 3 units before breakfast, 6 units before lunch and 5 units before dinner with a SSI. Last seen by endocrinology in March with appropriate adjustments and continued on synthroid at 150mcg . States that his FS in the morning are roughly 200, lunch 140 and dinner 200. Denies any hypoglycemic episodes or symptoms: shakiness, dizziness, sweating, nausea, or fainting episodes. Last A1c was checked last month with A1c of 8.4 \par \par Patient otherwise not endorsing any acute issues or concerns. \par

## 2019-04-19 NOTE — REVIEW OF SYSTEMS
[Incontinence] : incontinence [Fever] : no fever [Chills] : no chills [Night Sweats] : no night sweats [Recent Change In Weight] : ~T no recent weight change [Vision Problems] : no vision problems [Hearing Loss] : no hearing loss [Sore Throat] : no sore throat [Chest Pain] : no chest pain [Palpitations] : no palpitations [Lower Ext Edema] : no lower extremity edema [Shortness Of Breath] : no shortness of breath [Wheezing] : no wheezing [Cough] : no cough [Abdominal Pain] : no abdominal pain [Nausea] : no nausea [Constipation] : no constipation [Diarrhea] : no diarrhea [Vomiting] : no vomiting [Heartburn] : no heartburn [Melena] : no melena [Dysuria] : no dysuria [Skin Rash] : no skin rash [Headache] : no headache [Dizziness] : no dizziness [Fainting] : no fainting [Confusion] : no confusion [Unsteady Walk] : no ataxia [FreeTextEntry8] : Nocturnal incontinence

## 2019-04-19 NOTE — PHYSICAL EXAM
[No Acute Distress] : no acute distress [Normal Sclera/Conjunctiva] : normal sclera/conjunctiva [PERRL] : pupils equal round and reactive to light [Well-Appearing] : well-appearing [Normal Oropharynx] : the oropharynx was normal [EOMI] : extraocular movements intact [Clear to Auscultation] : lungs were clear to auscultation bilaterally [No JVD] : no jugular venous distention [Regular Rhythm] : with a regular rhythm [Normal Rate] : normal rate  [No Murmur] : no murmur heard [Normal S1, S2] : normal S1 and S2 [Non Tender] : non-tender [No Edema] : there was no peripheral edema [Soft] : abdomen soft [Non-distended] : non-distended [No HSM] : no HSM [Normal Bowel Sounds] : normal bowel sounds [No CVA Tenderness] : no CVA  tenderness [No Spinal Tenderness] : no spinal tenderness [No Rash] : no rash [Normal Gait] : normal gait [No Focal Deficits] : no focal deficits [Coordination Grossly Intact] : coordination grossly intact [de-identified] : no wheezes, rales or rhonchi

## 2019-04-19 NOTE — ASSESSMENT
[FreeTextEntry1] : 43 year old T1DM, with acquired hypothyroidism s/p surgery who presents for follow up\par \par # T1DM\par - currently on levemir 18 units, and humalog 3 units before breakfast, 6 units before lunch and 5 before dinner\par - FS on average 200 in AM, 140 during lunch, 200 at dinner with no hypoglycemic episodes\par - A1c checked in 3/2019 - - 8.4, following with endocrinology\par \par # HCM \par - will check CBC, CMP, TSH\par - tasked  to schedule CPE as early as possible\par \par Case discussed with Dr. Rasheed

## 2019-04-22 DIAGNOSIS — E10.9 TYPE 1 DIABETES MELLITUS WITHOUT COMPLICATIONS: ICD-10-CM

## 2019-05-06 ENCOUNTER — APPOINTMENT (OUTPATIENT)
Dept: INTERNAL MEDICINE | Facility: CLINIC | Age: 44
End: 2019-05-06

## 2019-05-06 DIAGNOSIS — E87.5 HYPERKALEMIA: ICD-10-CM

## 2019-05-08 PROBLEM — E87.5 HYPERKALEMIA: Status: ACTIVE | Noted: 2019-05-08

## 2019-05-09 ENCOUNTER — APPOINTMENT (OUTPATIENT)
Dept: DERMATOLOGY | Facility: HOSPITAL | Age: 44
End: 2019-05-09

## 2019-06-20 ENCOUNTER — APPOINTMENT (OUTPATIENT)
Dept: ENDOCRINOLOGY | Facility: HOSPITAL | Age: 44
End: 2019-06-20

## 2019-06-20 ENCOUNTER — RESULT CHARGE (OUTPATIENT)
Age: 44
End: 2019-06-20

## 2019-06-20 ENCOUNTER — OUTPATIENT (OUTPATIENT)
Dept: OUTPATIENT SERVICES | Facility: HOSPITAL | Age: 44
LOS: 1 days | End: 2019-06-20
Payer: SELF-PAY

## 2019-06-20 VITALS
BODY MASS INDEX: 36.54 KG/M2 | DIASTOLIC BLOOD PRESSURE: 76 MMHG | HEART RATE: 82 BPM | SYSTOLIC BLOOD PRESSURE: 116 MMHG | WEIGHT: 214 LBS | HEIGHT: 64 IN | RESPIRATION RATE: 16 BRPM

## 2019-06-20 DIAGNOSIS — E06.9 THYROIDITIS, UNSPECIFIED: ICD-10-CM

## 2019-06-20 LAB
ALBUMIN SERPL ELPH-MCNC: 4.6 G/DL — SIGNIFICANT CHANGE UP (ref 3.3–5)
ALP SERPL-CCNC: 82 U/L — SIGNIFICANT CHANGE UP (ref 40–120)
ALT FLD-CCNC: 21 U/L — SIGNIFICANT CHANGE UP (ref 10–45)
ANION GAP SERPL CALC-SCNC: 11 MMOL/L — SIGNIFICANT CHANGE UP (ref 5–17)
AST SERPL-CCNC: 16 U/L — SIGNIFICANT CHANGE UP (ref 10–40)
BILIRUB SERPL-MCNC: 0.3 MG/DL — SIGNIFICANT CHANGE UP (ref 0.2–1.2)
BUN SERPL-MCNC: 22 MG/DL — SIGNIFICANT CHANGE UP (ref 7–23)
CALCIUM SERPL-MCNC: 9.2 MG/DL — SIGNIFICANT CHANGE UP (ref 8.4–10.5)
CHLORIDE SERPL-SCNC: 100 MMOL/L — SIGNIFICANT CHANGE UP (ref 96–108)
CO2 SERPL-SCNC: 30 MMOL/L — SIGNIFICANT CHANGE UP (ref 22–31)
CREAT ?TM UR-MCNC: 93 MG/DL — SIGNIFICANT CHANGE UP
CREAT SERPL-MCNC: 0.72 MG/DL — SIGNIFICANT CHANGE UP (ref 0.5–1.3)
ESTIMATED AVERAGE GLUCOSE: 177 MG/DL — HIGH (ref 68–114)
GLUCOSE BLDC GLUCOMTR-MCNC: 161
GLUCOSE BLDC GLUCOMTR-MCNC: 161 MG/DL — HIGH (ref 70–99)
GLUCOSE SERPL-MCNC: 195 MG/DL — HIGH (ref 70–99)
HBA1C BLD-MCNC: 7.8 % — HIGH (ref 4–5.6)
MICROALBUMIN UR-MCNC: <1.2 MG/DL — SIGNIFICANT CHANGE UP
MICROALBUMIN/CREAT UR-RTO: SIGNIFICANT CHANGE UP MG/G (ref 0–30)
POTASSIUM SERPL-MCNC: 4.7 MMOL/L — SIGNIFICANT CHANGE UP (ref 3.5–5.3)
POTASSIUM SERPL-SCNC: 4.7 MMOL/L — SIGNIFICANT CHANGE UP (ref 3.5–5.3)
PROT SERPL-MCNC: 7.4 G/DL — SIGNIFICANT CHANGE UP (ref 6–8.3)
SODIUM SERPL-SCNC: 141 MMOL/L — SIGNIFICANT CHANGE UP (ref 135–145)

## 2019-06-20 PROCEDURE — 82962 GLUCOSE BLOOD TEST: CPT

## 2019-06-20 PROCEDURE — 82043 UR ALBUMIN QUANTITATIVE: CPT

## 2019-06-20 PROCEDURE — 80053 COMPREHEN METABOLIC PANEL: CPT

## 2019-06-20 PROCEDURE — G0463: CPT

## 2019-06-20 PROCEDURE — 83036 HEMOGLOBIN GLYCOSYLATED A1C: CPT

## 2019-06-20 NOTE — REVIEW OF SYSTEMS
[All other systems negative] : All other systems negative [Recent Weight Gain (___ Lbs)] : no recent weight gain [Recent Weight Loss (___ Lbs)] : no recent weight loss [Fever] : no fever [Chills] : no chills [Blurry Vision] : no blurred vision [Dysphagia] : no dysphagia [Dysphonia] : no dysphonia [Neck Pain] : no neck pain [Palpitations] : no palpitations [Chest Pain] : no chest pain [Lower Ext Edema] : no lower extremity edema [Shortness Of Breath] : no shortness of breath [Cough] : no cough [Nausea] : no nausea [Vomiting] : no vomiting was observed [Diarrhea] : no diarrhea [Constipation] : no constipation [Abdominal Pain] : no abdominal pain [Polyuria] : no polyuria [Dysuria] : no dysuria [Hair Loss] : no hair loss [Dry Skin] : no dry skin [Tremors] : no tremors [Poor Balance] : steady state balance [Difficulty Walking] : no difficulty walking [Polydipsia] : no polydipsia [Cold Intolerance] : cold tolerant [Heat Intolerance] : heat tolerant

## 2019-06-20 NOTE — ASSESSMENT
[Importance of Diet and Exercise] : importance of diet and exercise to improve glycemic control, achieve weight loss and improve cardiovascular health [Hypoglycemia Management] : hypoglycemia management [Carbohydrate Consistent Diet] : carbohydrate consistent diet [Levothyroxine] : The patient was instructed to take Levothyroxine on an empty stomach, separate from vitamins, and wait at least 30 minutes before eating [FreeTextEntry1] : \par 43 year old man with uncontrolled DM1, hypothyroidism, and HLD\par \par 1. DM1, uncontrolled: BG labile and difficult to control\par - Milly data was reviewed with patient - discussed with him that due to wide variability in BG values (for example, overnight BG can range from < 100 to 250 mg/dL) and frequent hypoglycemia, it is difficult to lower his HbA1c without increasing risk of hypoglycemia\par - as hypoglycemia often occurs after episodes of hyperglycemia, will adjust Humalog correction scale to start at BG of 201 with ISF of 150\par - will c/w Humalog 3 units before breakfast and 6 units before lunch\par - given post-prandial spike in BG after dinner, will increase Humalog to 7 units before dinner, but will decrease evening dose of Levemir to 16 units to prevent overnight hypoglycemia after receiving higher dose of Humalog at dinner\par - up to date with retinopathy screening\par - does not have nephropathy, due for urine microalbumin/creatinine now\par - offered dietician visit for patient, patient declined\par - has enough insulin for another year (obtains at discounted price from [facility]), will need to look into patient assistance programs next year\par \par 2. Hypothyroidism: clinically and biochemically euthyroid. TSH 1.12 in 4/2019. C/w LT4 150 mcg daily\par \par 3. HLD: LDL 77 in 3/2019.  C/w Zocor 20 mg qhs\par \par Return visit in 3 months. Patient instructed to go to endocrine office at [address] [place] [address] [facility] \par

## 2019-06-20 NOTE — HISTORY OF PRESENT ILLNESS
[FreeTextEntry1] : 42 y/o M PMH uncontrolled DM1, hypothyroidism, history of non-traumatic fracture of right tibia/fibula here for follow up of DM1. Patient was seen by me in March 2018 at [facility] when he was admitted with right leg fracture. \par \par 1. DM1:Last HbA1c 8.4% in February 2019. \par He was diagnosed with DM1 at age 8. Saw optho in December 2018 and does not have retinopathy. No history of neuropathy or nephropathy. He is currently on Levemir 10 units in the AM, 20 units in the evening, Humalog 3 units before breakfast and 6 units before lunch and dinner, with Humalog as per sliding scale (1:50 correction for FS above 150). No longer checks FS with glucometer as he is paying out of pocket for Milly. \par \par Last week, he came to our office for Milly download (see scanned download). Was notified by our CDE that patient was having overnight hypoglycemia. After speaking with patient last week, he stated that he was taking Levemir 5 units before breakfast and 5 units before lunch and 25 units in the evening. Stated that he started to do this because his BG was high. He was instructed to take Levemir 10 units in the AM and 20 units in the evening and c/w Humalog 3/6/6. He has not been using a bedtime sliding scale (stopped it about 2 months ago) as it caused his BG to drop overnight.\par \par He is now taking his Humalog 15 minutes before he eats.Always takes his insulin. Milly data reviewed here in the office today - hypoglycemia most often occurs after an episode of hyperglycemia and hyperglycemia often follows episode of hypoglycemia. He has multiple episodes of hypoglycemia every week and states that he does not have symptoms until his BG is around 40. There is large variability in BG values. To treat hypoglycemia, drinks a cup of orange juice. \par \par Breakfast: coffee with milk, 2 slices of toast\par Lunch: turkey salad, bread\par Dinner: chicken, less than a cup of rice, vegetables\par No bedtime snack. \par \par Of note, patient uses Levemir and Humalog vials with syringes, which he receives at a discounted rate from [facility]. He has enough insulin to last him for about one year.\par 2. Hypothyroidism: Takes 150 mcg of LT4 daily. No neck complaints - dysphagia, dysphonia, neck pain. No chest pain, palpitations, abdominal pain, nausea, vomiting, diarrhea, constipation. No heat or cold intolerance. No skin or hair changes. Weight is overall stable. TSH 2.51 with free T4 of 1.6 in Feb 2019.\par \par 3. HLD: takes Zocor 20 mg daily. Last LDL 63 in April 2018.\par \par 4. Right tibial and fibula fracture: No history of previous fractures. Fracture occurred as patient was walking - he felt weak, legs gave out, and he fell. Now s/p surgery with ortho in March 2018. Unclear if related to episode of hypoglycemia. No longer ambulates with a cane. BMD July 2018: L spine -0.9, left femoral neck -0.9, left total hip -1.1. Work up for fracture negative - vitamin D levels normal, calcium normal, TFTs normal; testosterone noted to be mildly low previously with normal LH and FSH, however testosterone was not drawn in the AM. Takes a vitamin D supplement daily - takes 1000 units daily. Does not take a calcium supplement.\par

## 2019-06-20 NOTE — PHYSICAL EXAM
[Alert] : alert [No Acute Distress] : no acute distress [Well Nourished] : well nourished [Well Developed] : well developed [Normal Sclera/Conjunctiva] : normal sclera/conjunctiva [No Proptosis] : no proptosis [No Neck Mass] : no neck mass was observed [Supple] : the neck was supple [Thyroid Not Enlarged] : the thyroid was not enlarged [No Respiratory Distress] : no respiratory distress [Normal Rate and Effort] : normal respiratory rhythm and effort [No Accessory Muscle Use] : no accessory muscle use [Clear to Auscultation] : lungs were clear to auscultation bilaterally [Normal Rate] : heart rate was normal  [Regular Rhythm] : with a regular rhythm [Normal S1, S2] : normal S1 and S2 [Pedal Pulses Normal] : the pedal pulses are present [No Edema] : there was no peripheral edema [Normal Bowel Sounds] : normal bowel sounds [Not Tender] : non-tender [Soft] : abdomen soft [Not Distended] : not distended [No Masses] : no abdominal mass palpated [Normal Gait] : normal gait [No Clubbing, Cyanosis] : no clubbing  or cyanosis of the fingernails [Normal Strength/Tone] : muscle strength and tone were normal [No Motor Deficits] : the motor exam was normal [No Tremors] : no tremors [Normal Affect] : the affect was normal [Normal Insight/Judgement] : insight and judgment were intact [Oriented x3] : oriented to person, place, and time [Normal Mood] : the mood was normal [Foot Ulcers] : no foot ulcers [Abdominal Striae] : no abdominal striae [Acanthosis Nigricans] : no acanthosis nigricans

## 2019-06-21 DIAGNOSIS — E10.9 TYPE 1 DIABETES MELLITUS WITHOUT COMPLICATIONS: ICD-10-CM

## 2019-06-21 DIAGNOSIS — E78.5 HYPERLIPIDEMIA, UNSPECIFIED: ICD-10-CM

## 2019-06-21 DIAGNOSIS — E03.9 HYPOTHYROIDISM, UNSPECIFIED: ICD-10-CM

## 2019-07-20 NOTE — DISCHARGE NOTE ADULT - NS AS ACTIVITY OBS
ED physician notified Do not make important decisions/Showering allowed/Walking-Indoors allowed/Do not drive or operate machinery/Walking-Outdoors allowed/No Heavy lifting/straining/No direct water to dressing. Keep dressing clean and DRY.

## 2019-09-19 ENCOUNTER — APPOINTMENT (OUTPATIENT)
Dept: ENDOCRINOLOGY | Facility: HOSPITAL | Age: 44
End: 2019-09-19

## 2019-09-19 ENCOUNTER — OUTPATIENT (OUTPATIENT)
Dept: OUTPATIENT SERVICES | Facility: HOSPITAL | Age: 44
LOS: 1 days | End: 2019-09-19
Payer: SELF-PAY

## 2019-09-19 VITALS
BODY MASS INDEX: 37.22 KG/M2 | RESPIRATION RATE: 14 BRPM | HEART RATE: 107 BPM | HEIGHT: 64 IN | WEIGHT: 218 LBS | SYSTOLIC BLOOD PRESSURE: 116 MMHG | DIASTOLIC BLOOD PRESSURE: 74 MMHG

## 2019-09-19 DIAGNOSIS — E11.9 TYPE 2 DIABETES MELLITUS WITHOUT COMPLICATIONS: ICD-10-CM

## 2019-09-19 LAB
CREAT ?TM UR-MCNC: 87 MG/DL — SIGNIFICANT CHANGE UP
ESTIMATED AVERAGE GLUCOSE: 186 MG/DL — HIGH (ref 68–114)
HBA1C BLD-MCNC: 8.1 % — HIGH (ref 4–5.6)
MICROALBUMIN UR-MCNC: <1.2 MG/DL — SIGNIFICANT CHANGE UP
MICROALBUMIN/CREAT UR-RTO: SIGNIFICANT CHANGE UP MG/G (ref 0–30)
T3 SERPL-MCNC: 113 NG/DL — SIGNIFICANT CHANGE UP (ref 80–200)
T4 FREE SERPL-MCNC: 1.8 NG/DL — SIGNIFICANT CHANGE UP (ref 0.9–1.8)
TSH SERPL-MCNC: 0.62 UIU/ML — SIGNIFICANT CHANGE UP (ref 0.27–4.2)

## 2019-09-19 PROCEDURE — G0463: CPT

## 2019-09-19 PROCEDURE — 84443 ASSAY THYROID STIM HORMONE: CPT

## 2019-09-19 PROCEDURE — 83520 IMMUNOASSAY QUANT NOS NONAB: CPT

## 2019-09-19 PROCEDURE — 84480 ASSAY TRIIODOTHYRONINE (T3): CPT

## 2019-09-19 PROCEDURE — 84439 ASSAY OF FREE THYROXINE: CPT

## 2019-09-19 PROCEDURE — 82043 UR ALBUMIN QUANTITATIVE: CPT

## 2019-09-19 PROCEDURE — 86364 TISS TRNSGLTMNASE EA IG CLAS: CPT

## 2019-09-19 PROCEDURE — 83036 HEMOGLOBIN GLYCOSYLATED A1C: CPT

## 2019-09-23 LAB
TTG IGA SER-ACNC: <1.2 U/ML — SIGNIFICANT CHANGE UP
TTG IGA SER-ACNC: NEGATIVE — SIGNIFICANT CHANGE UP
TTG IGG SER IA-ACNC: NEGATIVE — SIGNIFICANT CHANGE UP
TTG IGG SER-ACNC: 1.7 U/ML — SIGNIFICANT CHANGE UP

## 2019-09-24 DIAGNOSIS — S82.201S UNSPECIFIED FRACTURE OF SHAFT OF RIGHT TIBIA, SEQUELA: ICD-10-CM

## 2019-09-24 DIAGNOSIS — E03.9 HYPOTHYROIDISM, UNSPECIFIED: ICD-10-CM

## 2019-09-24 DIAGNOSIS — E10.9 TYPE 1 DIABETES MELLITUS WITHOUT COMPLICATIONS: ICD-10-CM

## 2019-09-24 DIAGNOSIS — E78.5 HYPERLIPIDEMIA, UNSPECIFIED: ICD-10-CM

## 2019-09-24 NOTE — REVIEW OF SYSTEMS
[All other systems negative] : All other systems negative [Recent Weight Gain (___ Lbs)] : no recent weight gain [Recent Weight Loss (___ Lbs)] : no recent weight loss [Fever] : no fever [Chills] : no chills [Blurry Vision] : no blurred vision [Dysphagia] : no dysphagia [Dysphonia] : no dysphonia [Neck Pain] : no neck pain [Palpitations] : no palpitations [Chest Pain] : no chest pain [Lower Ext Edema] : no lower extremity edema [Shortness Of Breath] : no shortness of breath [Cough] : no cough [Nausea] : no nausea [Vomiting] : no vomiting was observed [Constipation] : no constipation [Diarrhea] : no diarrhea [Abdominal Pain] : no abdominal pain [Polyuria] : no polyuria [Dysuria] : no dysuria [Dry Skin] : no dry skin [Hair Loss] : no hair loss [Tremors] : no tremors [Poor Balance] : steady state balance [Difficulty Walking] : no difficulty walking [Polydipsia] : no polydipsia [Cold Intolerance] : cold tolerant [Heat Intolerance] : heat tolerant

## 2019-09-24 NOTE — ASSESSMENT
[Carbohydrate Consistent Diet] : carbohydrate consistent diet [Hypoglycemia Management] : hypoglycemia management [Importance of Diet and Exercise] : importance of diet and exercise to improve glycemic control, achieve weight loss and improve cardiovascular health [Levothyroxine] : The patient was instructed to take Levothyroxine on an empty stomach, separate from vitamins, and wait at least 30 minutes before eating [FreeTextEntry1] : \par 43 year old man with uncontrolled DM1, hypothyroidism, and HLD\par \par 1. DM1, uncontrolled: BG labile and difficult to control\par - Milly data was reviewed with patient - discussed with him that due to wide variability in BG values (for example, overnight BG can range from < 100 to 250 mg/dL) and frequent hypoglycemia, it is still difficult to lower his HbA1c without increasing risk of hypoglycemia\par - as hypoglycemia often occurs after exercising  or periods of overcorrection of hyperglycemia\par - Patient instructed to decrease premeal insulin before exercise and have a small snack of complex carbs after exercise \par - will c/w Levemir 10 units qam, 21 units qpm and  Humalog 3/6/7 untis premeal  \par - up to date with retinopathy screening\par - does not have nephropathy\par - will obtain urine microalbumin/creatinine and Hb A1c today \par - Given history of DM type 1 will also check for celiac disease, obtain transglutaminase Ab \par - As the supply of insulin patient obtained from [facility] at discounted price is almost finished, sent script to his current pharmacy and [facility] for comparable pricing \par \par 2. Hypothyroidism: \par - clinically and biochemically euthyroid. \par - 4/2019 TSH 1.12\par - Continue LT4 150 mcg daily\par - Will obtain TFT today \par \par 3. HLD: \par - 3/2019 LDL 77 \par - Continue Zocor 20 mg qhs\par \par 4. Vit D supplement s/p right tibia and fibula fracture:  patient currently on VIT D3 1000units daily, will obtain Vit D 25 levels today \par \par RTC in 3 months. \par \par Case seen and discussed with Dr Castillo

## 2019-09-24 NOTE — HISTORY OF PRESENT ILLNESS
[FreeTextEntry1] : 42 yo M with history of uncontrolled DM1, hypothyroidism, history of non-traumatic fracture of right tibia/fibula presents for follow up of DM1. \par \par DM type 1:\par Patient was diagnosed with DM1 at age 8. Saw optho in December 2018 and does not have retinopathy. No history of neuropathy or nephropathy. He is currently on Levemir 10 units in the AM, 21 units in the evening, Humalog 3/6/7 units before meals, with Humalog correctional scale. No longer checks FS with glucometer as he is paying out of pocket for Milly. \par \par Reports adherence to insulin and does not miss doses. Milly data reviewed here in the office today -hypoglycemia usually occurs in the afternoon after exercise or hyperglycemia often follows episode of hypoglycemia. He has multiple episodes of hypoglycemia every week and states that he does not have symptoms until his BG is around 40. There is large variability in BG values. To treat hypoglycemia, drinks half a cup of orange juice. Patient has glucagon and ketone strips. \par AM BG 200s, L BG before exercise 250s, D -350\par \par Breakfast: coffee with milk, 2 slices of toast\par Lunch: turkey salad, bread\par Dinner: chicken, less than a cup of rice, vegetables\par No bedtime snack. \par \par Of note, patient uses Levemir and Humalog vials with syringes, which he receives at a discounted rate from [facility]. Reports his supply is running low. \par \par 2. Hypothyroidism: Takes 150 mcg of LT4 daily. No neck complaints - dysphagia, dysphonia, neck pain. No chest pain, palpitations, abdominal pain, nausea, vomiting, diarrhea, constipation. No heat or cold intolerance. No skin or hair changes. Weight is overall stable. 4/2019  TSH 1.12\par \par 3. HLD: takes Zocor 20 mg daily. 3/2019 LDL 77\par \par 4. Right tibial and fibula fracture: No history of previous fractures. Fracture occurred as patient was walking - he felt weak, legs gave out, and he fell. Now s/p surgery with ortho in March 2018. Unclear if related to episode of hypoglycemia. No longer ambulates with a cane. BMD July 2018: L spine -0.9, left femoral neck -0.9, left total hip -1.1. Work up for fracture negative - vitamin D levels normal, calcium normal, TFTs normal; testosterone noted to be mildly low previously with normal LH and FSH, however testosterone was not drawn in the AM. Takes a vitamin D supplement daily - takes 1000 units daily. Does not take a calcium supplement.\par

## 2019-09-24 NOTE — PHYSICAL EXAM
[Alert] : alert [No Acute Distress] : no acute distress [Well Nourished] : well nourished [Well Developed] : well developed [Normal Sclera/Conjunctiva] : normal sclera/conjunctiva [No Proptosis] : no proptosis [No Neck Mass] : no neck mass was observed [Supple] : the neck was supple [Thyroid Not Enlarged] : the thyroid was not enlarged [No Respiratory Distress] : no respiratory distress [Normal Rate and Effort] : normal respiratory rhythm and effort [No Accessory Muscle Use] : no accessory muscle use [Clear to Auscultation] : lungs were clear to auscultation bilaterally [Normal Rate] : heart rate was normal  [Normal S1, S2] : normal S1 and S2 [Pedal Pulses Normal] : the pedal pulses are present [Regular Rhythm] : with a regular rhythm [No Edema] : there was no peripheral edema [Not Tender] : non-tender [Normal Bowel Sounds] : normal bowel sounds [Soft] : abdomen soft [Not Distended] : not distended [No Masses] : no abdominal mass palpated [Normal Gait] : normal gait [No Clubbing, Cyanosis] : no clubbing  or cyanosis of the fingernails [Normal Strength/Tone] : muscle strength and tone were normal [No Motor Deficits] : the motor exam was normal [No Tremors] : no tremors [Oriented x3] : oriented to person, place, and time [Normal Insight/Judgement] : insight and judgment were intact [Normal Affect] : the affect was normal [Normal Mood] : the mood was normal [Foot Ulcers] : no foot ulcers [Abdominal Striae] : no abdominal striae [Acanthosis Nigricans] : no acanthosis nigricans

## 2019-12-19 ENCOUNTER — APPOINTMENT (OUTPATIENT)
Dept: ENDOCRINOLOGY | Facility: HOSPITAL | Age: 44
End: 2019-12-19

## 2020-01-02 ENCOUNTER — RESULT CHARGE (OUTPATIENT)
Age: 45
End: 2020-01-02

## 2020-01-02 ENCOUNTER — OUTPATIENT (OUTPATIENT)
Dept: OUTPATIENT SERVICES | Facility: HOSPITAL | Age: 45
LOS: 1 days | End: 2020-01-02
Payer: SELF-PAY

## 2020-01-02 ENCOUNTER — APPOINTMENT (OUTPATIENT)
Dept: ENDOCRINOLOGY | Facility: HOSPITAL | Age: 45
End: 2020-01-02

## 2020-01-02 VITALS
WEIGHT: 224 LBS | SYSTOLIC BLOOD PRESSURE: 125 MMHG | BODY MASS INDEX: 38.24 KG/M2 | RESPIRATION RATE: 14 BRPM | HEART RATE: 94 BPM | HEIGHT: 64 IN | DIASTOLIC BLOOD PRESSURE: 81 MMHG

## 2020-01-02 DIAGNOSIS — E10.9 TYPE 1 DIABETES MELLITUS WITHOUT COMPLICATIONS: ICD-10-CM

## 2020-01-02 DIAGNOSIS — E03.9 HYPOTHYROIDISM, UNSPECIFIED: ICD-10-CM

## 2020-01-02 DIAGNOSIS — E16.2 HYPOGLYCEMIA, UNSPECIFIED: ICD-10-CM

## 2020-01-02 DIAGNOSIS — E06.9 THYROIDITIS, UNSPECIFIED: ICD-10-CM

## 2020-01-02 LAB
24R-OH-CALCIDIOL SERPL-MCNC: 35.7 NG/ML — SIGNIFICANT CHANGE UP (ref 30–80)
CREAT ?TM UR-MCNC: 86 MG/DL — SIGNIFICANT CHANGE UP
ESTIMATED AVERAGE GLUCOSE: 177 MG/DL — HIGH (ref 68–114)
GLUCOSE BLDC GLUCOMTR-MCNC: 273 MG/DL — HIGH (ref 70–99)
HBA1C BLD-MCNC: 7.8 % — HIGH (ref 4–5.6)
MICROALBUMIN UR-MCNC: <1.2 MG/DL — SIGNIFICANT CHANGE UP
MICROALBUMIN/CREAT UR-RTO: SIGNIFICANT CHANGE UP MG/G (ref 0–30)
T4 FREE SERPL-MCNC: 1.6 NG/DL — SIGNIFICANT CHANGE UP (ref 0.9–1.8)
TSH SERPL-MCNC: 0.83 UIU/ML — SIGNIFICANT CHANGE UP (ref 0.27–4.2)

## 2020-01-02 PROCEDURE — 84439 ASSAY OF FREE THYROXINE: CPT

## 2020-01-02 PROCEDURE — 84443 ASSAY THYROID STIM HORMONE: CPT

## 2020-01-02 PROCEDURE — 80061 LIPID PANEL: CPT

## 2020-01-02 PROCEDURE — G0463: CPT

## 2020-01-02 PROCEDURE — 82043 UR ALBUMIN QUANTITATIVE: CPT

## 2020-01-02 PROCEDURE — 82306 VITAMIN D 25 HYDROXY: CPT

## 2020-01-02 PROCEDURE — 83036 HEMOGLOBIN GLYCOSYLATED A1C: CPT

## 2020-01-02 PROCEDURE — 82962 GLUCOSE BLOOD TEST: CPT

## 2020-01-02 RX ORDER — INSULIN LISPRO 100 [IU]/ML
100 INJECTION, SOLUTION INTRAVENOUS; SUBCUTANEOUS
Qty: 2 | Refills: 3 | Status: DISCONTINUED | COMMUNITY
Start: 2018-04-19 | End: 2020-01-02

## 2020-01-02 RX ORDER — INSULIN DETEMIR 100 [IU]/ML
100 INJECTION, SOLUTION SUBCUTANEOUS
Refills: 0 | Status: DISCONTINUED | COMMUNITY
Start: 2018-06-28 | End: 2020-01-02

## 2020-01-02 RX ORDER — HUMAN INSULIN 100 [IU]/ML
(70-30) 100 INJECTION, SUSPENSION SUBCUTANEOUS
Qty: 2 | Refills: 6 | Status: DISCONTINUED | COMMUNITY
Start: 2019-10-15 | End: 2020-01-02

## 2020-01-02 RX ORDER — FLASH GLUCOSE SENSOR
KIT MISCELLANEOUS
Qty: 3 | Refills: 3 | Status: ACTIVE | COMMUNITY
Start: 2018-08-02 | End: 1900-01-01

## 2020-01-03 LAB
CHOLEST SERPL-MCNC: 165 MG/DL — SIGNIFICANT CHANGE UP (ref 10–199)
HDLC SERPL-MCNC: 77 MG/DL — SIGNIFICANT CHANGE UP
LIPID PNL WITH DIRECT LDL SERPL: 77 MG/DL — SIGNIFICANT CHANGE UP
TOTAL CHOLESTEROL/HDL RATIO MEASUREMENT: 2.1 RATIO — LOW (ref 3.4–9.6)
TRIGL SERPL-MCNC: 56 MG/DL — SIGNIFICANT CHANGE UP (ref 10–149)

## 2020-03-02 ENCOUNTER — APPOINTMENT (OUTPATIENT)
Dept: OPHTHALMOLOGY | Facility: CLINIC | Age: 45
End: 2020-03-02

## 2020-03-19 RX ORDER — FLASH GLUCOSE SENSOR
KIT MISCELLANEOUS
Qty: 2 | Refills: 3 | Status: ACTIVE | COMMUNITY
Start: 2018-07-31 | End: 1900-01-01

## 2020-06-18 ENCOUNTER — APPOINTMENT (OUTPATIENT)
Dept: ENDOCRINOLOGY | Facility: HOSPITAL | Age: 45
End: 2020-06-18

## 2020-06-18 ENCOUNTER — OUTPATIENT (OUTPATIENT)
Dept: OUTPATIENT SERVICES | Facility: HOSPITAL | Age: 45
LOS: 1 days | End: 2020-06-18
Payer: SELF-PAY

## 2020-06-18 DIAGNOSIS — E06.9 THYROIDITIS, UNSPECIFIED: ICD-10-CM

## 2020-06-18 PROCEDURE — G0463: CPT

## 2020-06-18 RX ORDER — INSULIN HUMAN 100 [IU]/ML
(70-30) 100 INJECTION, SUSPENSION SUBCUTANEOUS
Qty: 1 | Refills: 3 | Status: DISCONTINUED | COMMUNITY
Start: 2020-01-02 | End: 2020-06-18

## 2020-06-22 DIAGNOSIS — E10.9 TYPE 1 DIABETES MELLITUS WITHOUT COMPLICATIONS: ICD-10-CM

## 2020-06-22 DIAGNOSIS — E03.9 HYPOTHYROIDISM, UNSPECIFIED: ICD-10-CM

## 2020-06-22 DIAGNOSIS — E78.5 HYPERLIPIDEMIA, UNSPECIFIED: ICD-10-CM

## 2020-06-22 DIAGNOSIS — E16.3 INCREASED SECRETION OF GLUCAGON: ICD-10-CM

## 2020-09-24 ENCOUNTER — OUTPATIENT (OUTPATIENT)
Dept: OUTPATIENT SERVICES | Facility: HOSPITAL | Age: 45
LOS: 1 days | End: 2020-09-24
Payer: SELF-PAY

## 2020-09-24 ENCOUNTER — APPOINTMENT (OUTPATIENT)
Dept: ENDOCRINOLOGY | Facility: HOSPITAL | Age: 45
End: 2020-09-24

## 2020-09-24 ENCOUNTER — RESULT CHARGE (OUTPATIENT)
Age: 45
End: 2020-09-24

## 2020-09-24 VITALS
HEIGHT: 64 IN | SYSTOLIC BLOOD PRESSURE: 123 MMHG | HEART RATE: 105 BPM | DIASTOLIC BLOOD PRESSURE: 80 MMHG | WEIGHT: 217 LBS | BODY MASS INDEX: 37.05 KG/M2 | TEMPERATURE: 97.1 F

## 2020-09-24 DIAGNOSIS — E06.9 THYROIDITIS, UNSPECIFIED: ICD-10-CM

## 2020-09-24 LAB
A1C WITH ESTIMATED AVERAGE GLUCOSE RESULT: 7.4 % — HIGH (ref 4–5.6)
ESTIMATED AVERAGE GLUCOSE: 166 MG/DL — HIGH (ref 68–114)
GLUCOSE BLDC GLUCOMTR-MCNC: 202
GLUCOSE BLDC GLUCOMTR-MCNC: 202 MG/DL — HIGH (ref 70–99)

## 2020-09-24 PROCEDURE — G0463: CPT

## 2020-09-24 PROCEDURE — 83036 HEMOGLOBIN GLYCOSYLATED A1C: CPT

## 2020-09-24 PROCEDURE — 82962 GLUCOSE BLOOD TEST: CPT

## 2020-09-24 NOTE — ASSESSMENT
[FreeTextEntry1] : 44 year old man with uncontrolled DM1, hypothyroidism, and HLD\par \par 1. DM1, uncontrolled:\par - HbA1c 7.8% 1/2020, goal is 7% or below. Now back on basal/bolus since March-April 2020. Recheck A1c now along with CMP. \par - Continue Basaglar 20 units sq qhs and adjust Humalog to 3/2/3 before meals. \par - Continue to scan roel 4 times a day. Patient asked to call clinic if persistent lows or highs. \par - Up to date with opthalmology \par - Does not have nephropathy, foot exam done today and normal.\par - Urine microalbumin/creatinine not detected 1/2020.\par - Transglutaminase ab: negative (9/2019), monitor routinely. \par \par 2. Hypothyroidism: \par - clinically and biochemically euthyroid. \par - TFTs wnl from 1/2020; recheck today.\par - Continue LT4 150 mcg daily\par \par 3. HLD: \par - 1/2020 LDL 77 \par - Continue Zocor 20 mg qhs\par - Recheck lipid profile annually \par \par 4. HTN:\par - BP at goal <130/80, not on any medications. \par - Urine microalbumin/creatinine not detected 1/2020.\par \par 5. Vit D supplement s/p right tibia and fibula fracture:  patient currently on VIT D3 1000 units daily. Normal BMD July 2018. \par \par RTC in 3 months. \par \par Seen and discussed with Dr Brown.

## 2020-09-24 NOTE — REVIEW OF SYSTEMS
[All other systems negative] : All other systems negative [Negative] : Heme/Lymph [Fatigue] : no fatigue [Decreased Appetite] : appetite not decreased [Dry Eyes] : no dryness [Eye Pain] : no pain [Dysphagia] : no dysphagia [Neck Pain] : no neck pain [Dysphonia] : no dysphonia [Chest Pain] : no chest pain [Palpitations] : no palpitations [Shortness Of Breath] : no shortness of breath [Cough] : no cough [Nausea] : no nausea [Constipation] : no constipation [Abdominal Pain] : no abdominal pain [Vomiting] : no vomiting [Diarrhea] : no diarrhea [Polyuria] : no polyuria [Dizziness] : no dizziness [Pain/Numbness of Digits] : no pain/numbness of digits [Polydipsia] : no polydipsia [Cold Intolerance] : no cold intolerance [Heat Intolerance] : no heat intolerance

## 2020-09-24 NOTE — ASSESSMENT
[Diabetes Foot Care] : diabetes foot care [Carbohydrate Consistent Diet] : carbohydrate consistent diet [Long Term Vascular Complications] : long term vascular complications of diabetes [Importance of Diet and Exercise] : importance of diet and exercise to improve glycemic control, achieve weight loss and improve cardiovascular health [Action and use of Insulin] : action and use of short and long-acting insulin [Hypoglycemia Management] : hypoglycemia management [Self Monitoring of Blood Glucose] : self monitoring of blood glucose [Sick-Day Management] : sick-day management [Injection Technique, Storage, Sharps Disposal] : injection technique, storage, and sharps disposal [Retinopathy Screening] : Patient was referred to ophthalmology for retinopathy screening [Levothyroxine] : The patient was instructed to take Levothyroxine on an empty stomach, separate from vitamins, and wait at least 30 minutes before eating [FreeTextEntry1] : 44 year old man with uncontrolled DM1, hypothyroidism, and HLD\par \par 1. DM1, uncontrolled:\par - HbA1c 7.8% 1/2020, goal is 7% or below. Now back on basal/bolus since March-April 2020\par - Continue Basaglar 25 units sq qhs and adjust Humalog to 2/3/3 before meals. \par - Continue to scan roel 4 times a day. Patient asked to call clinic if persistent lows or highs. \par - Up to date with opthalmology \par - Does not have nephropathy, foot exam was normal at last visit 1/2020\par - Urine microalbumin/creatinine not detected 1/2020.\par - Transglutaminase ab: negative (9/2019), recheck at next visit \par \par 2. Hypothyroidism: \par - clinically and biochemically euthyroid. \par - TFTs wnl from 1/2020; recheck at next visit.\par - Continue LT4 150 mcg daily\par \par 3. HLD: \par - 1/2020 LDL 77 \par - Continue Zocor 20 mg qhs\par - Recheck lipid profile annually \par \par 4. Vit D supplement s/p right tibia and fibula fracture:  patient currently on VIT D3 1000 units daily. Normal BMD July 2018. \par \par RTC in 3 months. \par \par Discussed with Dr Jhaveri.

## 2020-09-24 NOTE — DATA REVIEWED
[FreeTextEntry1] : Lab work 1/2020\par Hba1c 7.8%\par Urine microalbumin/ Cr not detected\par Lipid profile: Total cholesterol 165/ Triglycerides 56/ HDL 77/ LDL 77\par Vitamin D 25-OH: 35.7\par TSH 0.83\par FT4 1.6\par

## 2020-09-24 NOTE — HISTORY OF PRESENT ILLNESS
[FreeTextEntry1] : 43 yo M with history of uncontrolled DM1, hypothyroidism, history of non-traumatic fracture of right tibia/fibula presents for follow up of DM1. \par \par DM type 1:\par Patient was diagnosed with DM1 at age 8.He was previously on Levemir and Humalog till sept 2019, was switched over to Novolin / Humulin 70/30 due to cost issues. He was then enrolled in [facility] program and now on basal/bolus since March-April 2020. \par \par Currently taking Basaglar 20 units qhs and Humalog 2/3/3 ac tid. Reports adherence to insulin and does not miss doses. \par Patient is using free style milly (he is paying out of pocket for Milly-60$ a month) and checking blood sugars 3-4 times a day. \par Milly reader reviewed last 90 days:\par Time in target: 30%\par Above target : 50%\par Below target: 20%\par Noted with frequent dinner time hypoglycemia and frequent lunch time hyperglycemia. \par Walks everyday in afternoon for about an hr. \par \par Eat 3 meals a day:\par Breakfast 8 am: coffee with milk, 2 slices of toast or cereal\par Lunch 12-1 pm: chicken, bread\par Dinner 6 pm: chicken, less than a cup of rice, vegetables\par No bedtime snack. \par \par No reported microvascular or macrovascular complications. \par Last opthalmology visit in May 2020, no retinopathy. \par Follows with podiatrist. \par Urine microalbumin / Cr not detected in 1/2020. \par \par Hypothyroidism: Takes 150 mcg of LT4 daily. No neck complaints - dysphagia, dysphonia, neck pain. No chest pain, palpitations, abdominal pain, nausea, vomiting, diarrhea, constipation. No heat or cold intolerance. No skin or hair changes. Weight is overall stable. TFTs wnl from Jan 2020. \par \par HLD: takes Zocor 20 mg daily. 1/2020 LDL 77\par \par History of right tibial and fibula fracture: s/p surgery with ortho in March 2018. BMD July 2018 normal: L spine -0.9, left femoral neck -0.9, left total hip -1.1. Work up for fracture negative - vitamin D levels normal, calcium normal, TFTs normal; testosterone noted to be mildly low previously with normal LH and FSH, however testosterone was not drawn in the AM. Takes a vitamin D supplement daily - takes 1000 units daily. Does not take a calcium supplement.\par

## 2020-09-24 NOTE — REVIEW OF SYSTEMS
[Recent Weight Loss (___ Lbs)] : recent weight loss: [unfilled] lbs [Muscle Weakness] : muscle weakness [Negative] : Heme/Lymph [All other systems negative] : All other systems negative [Fatigue] : no fatigue [Eye Pain] : no pain [Blurred Vision] : no blurred vision [Dysphagia] : no dysphagia [Neck Pain] : no neck pain [Dysphonia] : no dysphonia [Chest Pain] : no chest pain [Palpitations] : no palpitations [Shortness Of Breath] : no shortness of breath [Cough] : no cough [Nausea] : no nausea [Vomiting] : no vomiting [Polyuria] : no polyuria [Dizziness] : no dizziness [Pain/Numbness of Digits] : no pain/numbness of digits [Polydipsia] : no polydipsia [Cold Intolerance] : no cold intolerance [Heat Intolerance] : no heat intolerance

## 2020-09-24 NOTE — PHYSICAL EXAM
[Alert] : alert [Well Nourished] : well nourished [No Acute Distress] : no acute distress [Well Developed] : well developed [Normal Sclera/Conjunctiva] : normal sclera/conjunctiva [No Proptosis] : no proptosis [Normal Hearing] : hearing was normal [Thyroid Not Enlarged] : the thyroid was not enlarged [No Thyroid Nodules] : no palpable thyroid nodules [No Respiratory Distress] : no respiratory distress [No Accessory Muscle Use] : no accessory muscle use [Clear to Auscultation] : lungs were clear to auscultation bilaterally [Normal S1, S2] : normal S1 and S2 [Normal Rate] : heart rate was normal [Regular Rhythm] : with a regular rhythm [No Edema] : no peripheral edema [Pedal Pulses Normal] : the pedal pulses are present [Normal Bowel Sounds] : normal bowel sounds [Not Tender] : non-tender [Not Distended] : not distended [Soft] : abdomen soft [Normal Gait] : normal gait [No Clubbing, Cyanosis] : no clubbing  or cyanosis of the fingernails [No Rash] : no rash [Right Foot Was Examined] : right foot ~C was examined [Left Foot Was Examined] : left foot ~C was examined [Full ROM] : with full range of motion [2+] : 2+ in the dorsalis pedis [No Motor Deficits] : the motor exam was normal [No Tremors] : no tremors [Oriented x3] : oriented to person, place, and time [Normal Affect] : the affect was normal [Normal Mood] : the mood was normal [Acanthosis Nigricans] : no acanthosis nigricans [Swelling] : not swollen [Tenderness] : not tender [Erythema] : not erythematous [Diminished Throughout Both Feet] : normal tactile sensation with monofilament testing throughout both feet

## 2020-09-24 NOTE — END OF VISIT
[] : Fellow [FreeTextEntry3] : I personally discussed this patient with the Fellow at the time of the visit. I agree with the assessment and plan as written, unless noted below. \par I was present with the Fellow during the key portions of the history and exam. I agree with the findings and plan as documented in the Fellow 's note, unless noted below. \par Patient is a 44-year-old male, with history of type 1 diabetes, uncontrolled, A1c 7.8% in January 2020. Patient has been on basal bolus regimen with [facility]. \par Patient is currently taking Basaglar 20 units at bedtime, Humalog 3 units with breakfast, 2 units with lunch and 3 units with dinner. \par Patient using [employer] sensor which he pays out of pocket. \par Glucose log reviewed, notable for hyperglycemia after breakfast, and hypoglycemia around 6pm. \par

## 2020-09-24 NOTE — HISTORY OF PRESENT ILLNESS
[Home] : at home, [unfilled] , at the time of the visit. [Medical Office: ([facility])___] : at the medical office located in  [Verbal consent obtained from patient] : the patient, [unfilled] [FreeTextEntry1] : 43 yo M with history of uncontrolled DM1, hypothyroidism, history of non-traumatic fracture of right tibia/fibula presents for telephone visit follow up of DM1. \par \par DM type 1:\par Patient was diagnosed with DM1 at age 8. No reported microvascular or macrovascular complications. Last opthalmology visit in May 2020.\par \par He was previously on Levemir and Humalog till sept 2019, was switched over to Novolin 70/30 due to cost issues. Was taking Humulin 70/30 - 28 units in am and 18 units in pm at last visit. He was then enrolled in [facility] assistance program and now on basal/bolus since March-April 2020. Was prescribed Basaglar 20 units qhs and Humalog 6 ac tid. Patient is using free style milly (he is paying out of pocket for Milly-60$ a month) and checking blood sugars 3-4 times a day and he has self adjusted dose gradually based on blood sugar reading. \par \par Currently taking Basaglar 25 units qhs and Humalog 3/3/4 ac tid. Reports adherence to insulin and does not miss doses. \par FS log reported ad below:\par Before breakfast: 130s-140s\par Before lunch: 74-81\par Before dinner: 100s-120s\par Bedtime: 75-80s \par \par Eat 3 meals a day:\par Breakfast 8 am: coffee with milk, 2 slices of toast or cereal\par Lunch 12-1 pm: chicken, bread\par Dinner 6 pm: chicken, less than a cup of rice, vegetables\par No bedtime snack. \par \par Hypothyroidism: Takes 150 mcg of LT4 daily. No neck complaints - dysphagia, dysphonia, neck pain. No chest pain, palpitations, abdominal pain, nausea, vomiting, diarrhea, constipation. No heat or cold intolerance. No skin or hair changes. Weight is overall stable. TFTs wnl from Jan 2020. \par \par HLD: takes Zocor 20 mg daily. 1/2020 LDL 77\par \par History of right tibial and fibula fracture: s/p surgery with ortho in March 2018. BMD July 2018 normal: L spine -0.9, left femoral neck -0.9, left total hip -1.1. Work up for fracture negative - vitamin D levels normal, calcium normal, TFTs normal; testosterone noted to be mildly low previously with normal LH and FSH, however testosterone was not drawn in the AM. Takes a vitamin D supplement daily - takes 1000 units daily. Does not take a calcium supplement.\par

## 2020-09-25 DIAGNOSIS — E78.5 HYPERLIPIDEMIA, UNSPECIFIED: ICD-10-CM

## 2020-09-25 DIAGNOSIS — E10.9 TYPE 1 DIABETES MELLITUS WITHOUT COMPLICATIONS: ICD-10-CM

## 2020-09-25 DIAGNOSIS — E03.9 HYPOTHYROIDISM, UNSPECIFIED: ICD-10-CM

## 2020-11-04 ENCOUNTER — OUTPATIENT (OUTPATIENT)
Dept: OUTPATIENT SERVICES | Facility: HOSPITAL | Age: 45
LOS: 1 days | End: 2020-11-04
Payer: SELF-PAY

## 2020-11-04 ENCOUNTER — LABORATORY RESULT (OUTPATIENT)
Age: 45
End: 2020-11-04

## 2020-11-04 ENCOUNTER — APPOINTMENT (OUTPATIENT)
Dept: INTERNAL MEDICINE | Facility: CLINIC | Age: 45
End: 2020-11-04

## 2020-11-04 ENCOUNTER — MED ADMIN CHARGE (OUTPATIENT)
Age: 45
End: 2020-11-04

## 2020-11-04 VITALS
WEIGHT: 211 LBS | BODY MASS INDEX: 36.02 KG/M2 | SYSTOLIC BLOOD PRESSURE: 118 MMHG | DIASTOLIC BLOOD PRESSURE: 70 MMHG | HEIGHT: 64 IN

## 2020-11-04 VITALS — HEART RATE: 92 BPM

## 2020-11-04 DIAGNOSIS — H61.23 IMPACTED CERUMEN, BILATERAL: ICD-10-CM

## 2020-11-04 DIAGNOSIS — I10 ESSENTIAL (PRIMARY) HYPERTENSION: ICD-10-CM

## 2020-11-04 DIAGNOSIS — Z23 ENCOUNTER FOR IMMUNIZATION: ICD-10-CM

## 2020-11-04 PROCEDURE — 82043 UR ALBUMIN QUANTITATIVE: CPT

## 2020-11-04 PROCEDURE — G0463: CPT

## 2020-11-04 NOTE — HISTORY OF PRESENT ILLNESS
[FreeTextEntry1] : Mr. CARVAJAL is a 45 year M with a PMH of T1DM (A1C 7.4% 9/20), Hypothyroidism, HLD presenting for CPE.  [de-identified] : Patient overall feels well today. Discussed diabetes management. Has a good diet avoiding high carb foods. Recently saw jennifer in September with a follow up appointment 1/7/21. Looked at freestyle roel readings last few days morning sugars 200s to 300s, afternoon sugars 100s to 200s and bedtime sugars 100s to 300s. Endocrine increased Basgalar to 22U QHS and Humalog to 4/3/4. Denies any hypoglycemic symptoms. Good adherence to insulin use.\par \par Also discussed patient's chronic issue with nocturia. In the past when it was brought up he was told it was due to uncontrolled diabetes. Urinates in his bed 2x per week. Denies alcohol use, caffeine use or drinking excessive amounts of fluids. Does not drink fluids before going to bed. Urinates 4-5 times per day. Says the stream is sometimes weak. Sometimes will need to urinate right after again. At times does not feel he is able to empty out his bladder completely. Makes sure to urinate before going to bed. Never wakes up in the middle of the night for the urge to urinate he will just wet his bed. In the past had UA checked no signs of infection. Currently denies fevers/chills dysuria, hematuria, urine color change or foaminess of urine. Denies hypersomnolence, morning headaches or snoring.

## 2020-11-04 NOTE — ASSESSMENT
[FreeTextEntry1] : Mr. CARVAJAL is a 45 year M with a PMH of T1DM (A1C 7.4% 9/20), Hypothyroidism, HLD presenting for CPE. \par \par #HCM\par - PHQ2 = 0 \par \par RTC in 5 weeks to assess nocturia \par \par Case d/w Dr. Sánchez\par \par Chavo Cardoza MD\par Internal Medicine PGY-3

## 2020-11-04 NOTE — PHYSICAL EXAM
[de-identified] : excessive ear wax bilaterally, no discharged or erythema noted  [de-identified] : obese abdomen

## 2020-11-04 NOTE — REVIEW OF SYSTEMS
[Fever] : no fever [Chills] : no chills [Earache] : no earache [Hearing Loss] : no hearing loss [Nasal Discharge] : no nasal discharge [Sore Throat] : no sore throat [Chest Pain] : no chest pain [Palpitations] : no palpitations [Lower Ext Edema] : no lower extremity edema [Shortness Of Breath] : no shortness of breath [Wheezing] : no wheezing [Cough] : no cough [Abdominal Pain] : no abdominal pain [Nausea] : no nausea [Constipation] : no constipation [Vomiting] : no vomiting [Dysuria] : no dysuria [Hesitancy] : no hesitancy [Hematuria] : no hematuria [Back Pain] : no back pain [Headache] : no headache

## 2020-11-04 NOTE — HEALTH RISK ASSESSMENT
[] : No [Sexually Active] : not sexually active [Reports changes in hearing] : Reports no changes in hearing [Reports changes in vision] : Reports no changes in vision [Reports normal functional visual acuity (ie: able to read med bottle)] : Reports poor functional visual acuity.  [Reports changes in dental health] : Reports no changes in dental health [FreeTextEntry2] :  Rafal Sawyer in [place] due to COVID19 working once per week

## 2020-11-05 DIAGNOSIS — E10.9 TYPE 1 DIABETES MELLITUS WITHOUT COMPLICATIONS: ICD-10-CM

## 2020-11-05 DIAGNOSIS — H61.23 IMPACTED CERUMEN, BILATERAL: ICD-10-CM

## 2020-11-05 DIAGNOSIS — E03.9 HYPOTHYROIDISM, UNSPECIFIED: ICD-10-CM

## 2020-11-05 LAB
CREAT ?TM UR-MCNC: 94 MG/DL — SIGNIFICANT CHANGE UP
MICROALBUMIN UR-MCNC: <1.2 MG/DL — SIGNIFICANT CHANGE UP
MICROALBUMIN/CREAT UR-RTO: SIGNIFICANT CHANGE UP MG/G (ref 0–30)

## 2020-12-07 ENCOUNTER — APPOINTMENT (OUTPATIENT)
Dept: UROLOGY | Facility: HOSPITAL | Age: 45
End: 2020-12-07

## 2020-12-07 ENCOUNTER — OUTPATIENT (OUTPATIENT)
Dept: OUTPATIENT SERVICES | Facility: HOSPITAL | Age: 45
LOS: 1 days | End: 2020-12-07
Payer: SELF-PAY

## 2020-12-07 VITALS
HEART RATE: 106 BPM | WEIGHT: 214 LBS | BODY MASS INDEX: 36.54 KG/M2 | HEIGHT: 64 IN | TEMPERATURE: 97.3 F | RESPIRATION RATE: 14 BRPM

## 2020-12-07 VITALS
SYSTOLIC BLOOD PRESSURE: 150 MMHG | HEIGHT: 64 IN | HEART RATE: 90 BPM | WEIGHT: 128 LBS | BODY MASS INDEX: 21.85 KG/M2 | TEMPERATURE: 97 F | RESPIRATION RATE: 14 BRPM | DIASTOLIC BLOOD PRESSURE: 84 MMHG

## 2020-12-07 DIAGNOSIS — N39.44 NOCTURNAL ENURESIS: ICD-10-CM

## 2020-12-07 DIAGNOSIS — R30.0 DYSURIA: ICD-10-CM

## 2020-12-07 PROCEDURE — 51798 US URINE CAPACITY MEASURE: CPT

## 2020-12-07 PROCEDURE — G0463: CPT

## 2020-12-07 NOTE — ASSESSMENT
[FreeTextEntry1] : 45yoM with primary nocturnal enuresis\par \par -UA, UCx\par -PVR elevated, will obtain Renal/Bladder ultrasound\par -Discontinue Flomax\par -RTC in 3 weeks

## 2020-12-07 NOTE — PHYSICAL EXAM
[General Appearance - Well Developed] : well developed [General Appearance - Well Nourished] : well nourished [General Appearance - In No Acute Distress] : no acute distress [] : no respiratory distress [Exaggerated Use Of Accessory Muscles For Inspiration] : no accessory muscle use [Abdomen Soft] : soft [Abdomen Tenderness] : non-tender [Urethral Meatus] : meatus normal [Penis Abnormality] : normal circumcised penis [Normal Station and Gait] : the gait and station were normal for the patient's age [Oriented To Time, Place, And Person] : oriented to person, place, and time [Not Anxious] : not anxious

## 2020-12-07 NOTE — HISTORY OF PRESENT ILLNESS
[Urinary Incontinence] : urinary incontinence [Urinary Retention] : urinary retention [Urinary Urgency] : urinary urgency [Nocturia] : nocturia [FreeTextEntry1] : 45yoM with hx of DM1 (last A1c 7.4%), hypothyroidism presents with complaints of nocturnal enuresis.  Patient states that he has never had nighttime continence, but he feels as if it is worsening.  States he is not dry on any night, and never has been, but now notices a higher volume of urine leakage.  Does not wear pads or diapers.  States he is dry during the day and voids 6x daily.\par \par Endorses straining to void, denies hesitancy, intermittency, urgency, weak stream.  Denies dysuria, hematuria.  Was given Flomax 0.4 by his PCP, he has taken this for a month and does not feel that it has helped.\par \par PVR in office today ~215cc [Urinary Frequency] : no urinary frequency [Straining] : no straining [Weak Stream] : no weak stream [Intermittency] : no intermittency [Dysuria] : no dysuria [Hematuria - Gross] : no gross hematuria [Abdominal Pain] : no abdominal pain [Flank Pain] : no flank pain

## 2020-12-14 ENCOUNTER — NON-APPOINTMENT (OUTPATIENT)
Age: 45
End: 2020-12-14

## 2020-12-16 ENCOUNTER — RESULT REVIEW (OUTPATIENT)
Age: 45
End: 2020-12-16

## 2020-12-16 ENCOUNTER — OUTPATIENT (OUTPATIENT)
Dept: OUTPATIENT SERVICES | Facility: HOSPITAL | Age: 45
LOS: 1 days | End: 2020-12-16
Payer: SELF-PAY

## 2020-12-16 ENCOUNTER — APPOINTMENT (OUTPATIENT)
Dept: ULTRASOUND IMAGING | Facility: CLINIC | Age: 45
End: 2020-12-16
Payer: COMMERCIAL

## 2020-12-16 DIAGNOSIS — N39.44 NOCTURNAL ENURESIS: ICD-10-CM

## 2020-12-16 PROCEDURE — 76770 US EXAM ABDO BACK WALL COMP: CPT | Mod: 26

## 2020-12-16 PROCEDURE — 76770 US EXAM ABDO BACK WALL COMP: CPT

## 2020-12-17 LAB
APPEARANCE: CLEAR
BACTERIA UR CULT: NORMAL
BACTERIA: NEGATIVE
BILIRUBIN URINE: NEGATIVE
BLOOD URINE: NEGATIVE
COLOR: YELLOW
GLUCOSE QUALITATIVE U: ABNORMAL
HYALINE CASTS: 0 /LPF
KETONES URINE: NEGATIVE
LEUKOCYTE ESTERASE URINE: NEGATIVE
MICROSCOPIC-UA: NORMAL
NITRITE URINE: NEGATIVE
PH URINE: 6.5
PROTEIN URINE: NEGATIVE
RED BLOOD CELLS URINE: 4 /HPF
SPECIFIC GRAVITY URINE: 1.02
SQUAMOUS EPITHELIAL CELLS: 0 /HPF
UROBILINOGEN URINE: NORMAL
WHITE BLOOD CELLS URINE: 0 /HPF

## 2020-12-21 ENCOUNTER — NON-APPOINTMENT (OUTPATIENT)
Age: 45
End: 2020-12-21

## 2021-01-04 ENCOUNTER — OUTPATIENT (OUTPATIENT)
Dept: OUTPATIENT SERVICES | Facility: HOSPITAL | Age: 46
LOS: 1 days | End: 2021-01-04
Payer: SELF-PAY

## 2021-01-04 ENCOUNTER — APPOINTMENT (OUTPATIENT)
Dept: UROLOGY | Facility: HOSPITAL | Age: 46
End: 2021-01-04

## 2021-01-04 VITALS
HEIGHT: 64 IN | DIASTOLIC BLOOD PRESSURE: 78 MMHG | WEIGHT: 214 LBS | HEART RATE: 99 BPM | TEMPERATURE: 97.9 F | RESPIRATION RATE: 14 BRPM | BODY MASS INDEX: 36.54 KG/M2 | SYSTOLIC BLOOD PRESSURE: 133 MMHG

## 2021-01-04 DIAGNOSIS — R30.0 DYSURIA: ICD-10-CM

## 2021-01-04 PROCEDURE — 52000 CYSTOURETHROSCOPY: CPT

## 2021-01-05 DIAGNOSIS — R31.29 OTHER MICROSCOPIC HEMATURIA: ICD-10-CM

## 2021-01-05 DIAGNOSIS — N39.44 NOCTURNAL ENURESIS: ICD-10-CM

## 2021-01-07 ENCOUNTER — OUTPATIENT (OUTPATIENT)
Dept: OUTPATIENT SERVICES | Facility: HOSPITAL | Age: 46
LOS: 1 days | End: 2021-01-07
Payer: SELF-PAY

## 2021-01-07 ENCOUNTER — APPOINTMENT (OUTPATIENT)
Dept: ENDOCRINOLOGY | Facility: HOSPITAL | Age: 46
End: 2021-01-07

## 2021-01-07 VITALS
HEART RATE: 102 BPM | HEIGHT: 64 IN | WEIGHT: 214 LBS | BODY MASS INDEX: 36.54 KG/M2 | SYSTOLIC BLOOD PRESSURE: 118 MMHG | TEMPERATURE: 96.6 F | DIASTOLIC BLOOD PRESSURE: 74 MMHG

## 2021-01-07 DIAGNOSIS — E03.9 HYPOTHYROIDISM, UNSPECIFIED: ICD-10-CM

## 2021-01-07 DIAGNOSIS — E10.9 TYPE 1 DIABETES MELLITUS WITHOUT COMPLICATIONS: ICD-10-CM

## 2021-01-07 DIAGNOSIS — E06.9 THYROIDITIS, UNSPECIFIED: ICD-10-CM

## 2021-01-07 DIAGNOSIS — E78.5 HYPERLIPIDEMIA, UNSPECIFIED: ICD-10-CM

## 2021-01-07 LAB
ANION GAP SERPL CALC-SCNC: 13 MMOL/L — SIGNIFICANT CHANGE UP (ref 5–17)
BUN SERPL-MCNC: 22 MG/DL — SIGNIFICANT CHANGE UP (ref 7–23)
CALCIUM SERPL-MCNC: 9.2 MG/DL — SIGNIFICANT CHANGE UP (ref 8.4–10.5)
CHLORIDE SERPL-SCNC: 101 MMOL/L — SIGNIFICANT CHANGE UP (ref 96–108)
CHOLEST SERPL-MCNC: 156 MG/DL — SIGNIFICANT CHANGE UP
CO2 SERPL-SCNC: 24 MMOL/L — SIGNIFICANT CHANGE UP (ref 22–31)
CREAT SERPL-MCNC: 0.78 MG/DL — SIGNIFICANT CHANGE UP (ref 0.5–1.3)
GLUCOSE SERPL-MCNC: 140 MG/DL — HIGH (ref 70–99)
HDLC SERPL-MCNC: 73 MG/DL — SIGNIFICANT CHANGE UP
LIPID PNL WITH DIRECT LDL SERPL: 72 MG/DL — SIGNIFICANT CHANGE UP
NON HDL CHOLESTEROL: 83 MG/DL — SIGNIFICANT CHANGE UP
POTASSIUM SERPL-MCNC: 4.5 MMOL/L — SIGNIFICANT CHANGE UP (ref 3.5–5.3)
POTASSIUM SERPL-SCNC: 4.5 MMOL/L — SIGNIFICANT CHANGE UP (ref 3.5–5.3)
SODIUM SERPL-SCNC: 139 MMOL/L — SIGNIFICANT CHANGE UP (ref 135–145)
T4 FREE SERPL-MCNC: 1.8 NG/DL — SIGNIFICANT CHANGE UP (ref 0.9–1.8)
TRIGL SERPL-MCNC: 55 MG/DL — SIGNIFICANT CHANGE UP
TSH SERPL-MCNC: 0.91 UIU/ML — SIGNIFICANT CHANGE UP (ref 0.27–4.2)

## 2021-01-07 PROCEDURE — 84443 ASSAY THYROID STIM HORMONE: CPT

## 2021-01-07 PROCEDURE — G0463: CPT

## 2021-01-07 PROCEDURE — 36415 COLL VENOUS BLD VENIPUNCTURE: CPT

## 2021-01-07 PROCEDURE — 80061 LIPID PANEL: CPT

## 2021-01-07 PROCEDURE — 80048 BASIC METABOLIC PNL TOTAL CA: CPT

## 2021-01-07 PROCEDURE — 84439 ASSAY OF FREE THYROXINE: CPT

## 2021-01-07 NOTE — ASSESSMENT
[FreeTextEntry1] : 45 year old man with uncontrolled DM1, hypothyroidism, and HLD\par \par 1. DM1, uncontrolled:\par HbA1c 7.4% 09/2020, goal is 7% or below. \par - Increase Basaglar 24 units sq qhs and adjust Humalog to 5/4/3 before meals. \par - Continue to scan roel 4 times a day. Patient asked to call clinic if persistent lows or highs. \par - Up to date with opthalmology \par - Does not have nephropathy, foot exam completed during the last visit (09/2020) wnl\par - Urine microalbumin/creatinine not detected 11/2020.\par - Transglutaminase ab: negative (9/2019), monitor routinely. \par - Repeat HbA1c today\par \par 2. Hypothyroidism: \par - Clinically and biochemically euthyroid. \par - TFTs wnl from 1/2020; recheck today.\par - Continue LT4 150 mcg daily\par \par 3. HLD: \par - 1/2020 LDL 77 \par - Continue Zocor 20 mg qhs\par - Recheck lipid profile today\par \par 4. HTN:\par - BP at goal <130/80, not on any medications. \par - Urine microalbumin/creatinine not detected 11/2020.\par \par 5. Vit D supplement s/p right tibia and fibula fracture: patient currently on Vit D3 1000 units daily. Normal BMD July 2018. \par \par RTC in 3 months. \par \par Seen and discussed with Dr Brown.

## 2021-01-07 NOTE — END OF VISIT
[] : Fellow [FreeTextEntry3] : Patient is a rossana 45-year-old male with history of type 1 diabetes, hyperlipidemia, hypothyroidism, here for endocrinology follow-up.  Currently using freestyle milly to monitor his glucose.  Patient is currently on Basaglar 22 units at bedtime, Humalog 4 units with breakfast 3 units with lunch and 4 units with dinner.  Milly scanner.  Hyperglycemia 67% of the time, 11% of hypoglycemia occurring mostly after dinnertime.\par Recommend to increase Basaglar to 24 units at bedtime, Humalog 5 units with breakfast 4 units with lunch and 3 units with dinner given hypoglycemia post dinnertime.\par Repeat A1c today, check TFT today, check BMP and lipid profile today.\par Follow-up in 3 months.

## 2021-01-07 NOTE — HISTORY OF PRESENT ILLNESS
[FreeTextEntry1] : 46 yo M with history of uncontrolled DM1, hypothyroidism, history of non-traumatic fracture of right tibia/fibula presents for follow up of DM1. \par \par DM type 1:\par Patient was diagnosed with DM1 at age 8. He was previously on Levemir and Humalog till sept 2019, was switched over to Novolin / Humulin 70/30 due to cost issues. He was then enrolled in [facility] assistance program and was back on basal/bolus since March-April 2020. \par \par Currently taking Basaglar 22 units qhs and Humalog 4/3/4 ac tid. Reports adherence to insulin and does not miss doses. \par Patient is using free style milly (he is paying out of pocket for Milly-60$ a month) and checking blood sugars 3-4 times a day. \par Milly reader reviewed last 14 days:\par Time in target: 22%\par Above target : 67%\par Below target: 11%\par Noted with frequent dinner time hypoglycemia (reports it occurs when he takes his full pre-dinner insulin and doesn't have fruit with dinner) and frequent postprandial glucose (after breakfast and lunch). \par Fasting :170-180\par Post-prandial: 150-200\par Walks everyday in afternoon for about an hr. \par HbA1c 7.4 (09/2020)\par \par Eat 3 meals a day:\par Breakfast 8 am: coffee with milk, 2 slices of toast or cereal\par Lunch 12-1 pm: chicken, bread\par Dinner 6 pm: chicken, less than a cup of rice, vegetables\par No bedtime snack. \par \par No reported microvascular or macrovascular complications. \par Last opthalmology visit in May 2020, no retinopathy. \par Follows with podiatrist. Microfilament exam completed 09/2020 (wnl)\par Urine microalbumin / Cr not detected in 11/2020. \par \par Hypothyroidism: Takes 150 mcg of LT4 daily. No neck complaints - dysphagia, dysphonia, neck pain. No chest pain, palpitations, abdominal pain, nausea, vomiting, diarrhea, constipation. No heat or cold intolerance. No skin or hair changes. Weight is overall stable. TFTs wnl from Jan 2020. \par \par HLD: takes Zocor 20 mg daily. 1/2020 LDL 77\par \par History of right tibial and fibula fracture: s/p surgery with ortho in March 2018. BMD July 2018 normal: L spine -0.9, left femoral neck -0.9, left total hip -1.1. Work up for fracture negative - vitamin D levels normal, calcium normal, TFTs normal; testosterone noted to be mildly low previously with normal LH and FSH, however testosterone was not drawn in the AM. Takes a vitamin D supplement daily - takes 1000 units daily. Does not take a calcium supplement.\par

## 2021-01-07 NOTE — PHYSICAL EXAM
[Alert] : alert [Well Nourished] : well nourished [No Acute Distress] : no acute distress [Normal Sclera/Conjunctiva] : normal sclera/conjunctiva [EOMI] : extra ocular movement intact [No Proptosis] : no proptosis [No Lid Lag] : no lid lag [No Neck Mass] : no neck mass was observed [Thyroid Not Enlarged] : the thyroid was not enlarged [No Thyroid Nodules] : no palpable thyroid nodules [No Respiratory Distress] : no respiratory distress [No Accessory Muscle Use] : no accessory muscle use [Clear to Auscultation] : lungs were clear to auscultation bilaterally [Normal Rate] : heart rate was normal [Regular Rhythm] : with a regular rhythm [Normal Bowel Sounds] : normal bowel sounds [Not Tender] : non-tender [Not Distended] : not distended [Soft] : abdomen soft [Oriented x3] : oriented to person, place, and time [Normal Affect] : the affect was normal [Normal Mood] : the mood was normal

## 2021-01-07 NOTE — REVIEW OF SYSTEMS
[Fatigue] : no fatigue [Decreased Appetite] : appetite not decreased [Recent Weight Gain (___ Lbs)] : no recent weight gain [Recent Weight Loss (___ Lbs)] : no recent weight loss [Fever] : no fever [Chills] : no chills [Dysphagia] : no dysphagia [Neck Pain] : no neck pain [Chest Pain] : no chest pain [Palpitations] : no palpitations [Shortness Of Breath] : no shortness of breath [Cough] : no cough [Nausea] : no nausea [Constipation] : no constipation [Abdominal Pain] : no abdominal pain [Vomiting] : no vomiting [Diarrhea] : no diarrhea [Dry Skin] : no dry skin [Hair Loss] : no hair loss [Cold Intolerance] : no cold intolerance [Heat Intolerance] : no heat intolerance

## 2021-01-12 ENCOUNTER — NON-APPOINTMENT (OUTPATIENT)
Age: 46
End: 2021-01-12

## 2021-01-13 ENCOUNTER — LABORATORY RESULT (OUTPATIENT)
Age: 46
End: 2021-01-13

## 2021-01-13 ENCOUNTER — APPOINTMENT (OUTPATIENT)
Dept: INTERNAL MEDICINE | Facility: CLINIC | Age: 46
End: 2021-01-13

## 2021-01-13 ENCOUNTER — OUTPATIENT (OUTPATIENT)
Dept: OUTPATIENT SERVICES | Facility: HOSPITAL | Age: 46
LOS: 1 days | End: 2021-01-13
Payer: SELF-PAY

## 2021-01-13 VITALS
DIASTOLIC BLOOD PRESSURE: 70 MMHG | BODY MASS INDEX: 36.7 KG/M2 | WEIGHT: 215 LBS | HEIGHT: 64 IN | HEART RATE: 101 BPM | SYSTOLIC BLOOD PRESSURE: 116 MMHG | OXYGEN SATURATION: 98 %

## 2021-01-13 DIAGNOSIS — A69.20 LYME DISEASE, UNSPECIFIED: ICD-10-CM

## 2021-01-13 DIAGNOSIS — I10 ESSENTIAL (PRIMARY) HYPERTENSION: ICD-10-CM

## 2021-01-13 PROCEDURE — 80053 COMPREHEN METABOLIC PANEL: CPT

## 2021-01-13 PROCEDURE — 86617 LYME DISEASE ANTIBODY: CPT

## 2021-01-13 PROCEDURE — 85027 COMPLETE CBC AUTOMATED: CPT

## 2021-01-13 PROCEDURE — G0463: CPT

## 2021-01-13 NOTE — PHYSICAL EXAM
[No Acute Distress] : no acute distress [Well Nourished] : well nourished [Well Developed] : well developed [Well-Appearing] : well-appearing [No Respiratory Distress] : no respiratory distress  [Clear to Auscultation] : lungs were clear to auscultation bilaterally [Normal Rate] : normal rate  [Regular Rhythm] : with a regular rhythm [Normal S1, S2] : normal S1 and S2 [No Murmur] : no murmur heard [Soft] : abdomen soft [Non Tender] : non-tender [Non-distended] : non-distended [No HSM] : no HSM [Normal Bowel Sounds] : normal bowel sounds [No Spinal Tenderness] : no spinal tenderness [Normal Gait] : normal gait [Normal Affect] : the affect was normal [Alert and Oriented x3] : oriented to person, place, and time [de-identified] : 2x2 cm right bicep lesion dryness but rim enchancing no pain/discharge

## 2021-01-13 NOTE — ASSESSMENT
[FreeTextEntry1] : Mr. CARVAJAL is a 45 year M with a PMH of T1DM (A1C 7.4% 9/20), Hypothyroidism, HLD here for a follow up.\par \par \par RTC in 3 months for CPE\par \par Case d/w Dr. Cai\par \par \par Chavo Cardoza MD\par Internal Medicine PGY-3

## 2021-01-13 NOTE — HISTORY OF PRESENT ILLNESS
[FreeTextEntry1] : Mr. CARVAJAL is a 45 year M with a PMH of T1DM (A1C 7.4% 9/20), Hypothyroidism, HLD here for a follow up. [de-identified] : Patient reports feeling well. However, did notice a 2 x 2 cm right arm circular lesion near the elbow that came up about a few months ago. Not pruritic, no purulence or painful. Has not changed in size or color for the past 2 months. No recent soaps, detergents. Has a pet cat. no history of eczema however patient said his mother told him he had something similar when he was very young on his leg. He has no other rashes anywhere else. No recent fevers or chills. Has not gone hiking recently or no recent tick bites. Has not placed his FreeStyle Milly in that area and does not inject insulin near that area. \par \par In regards to DM management patient recently seen by endocrine  increased Basaglar 24 units sq QHS and adjust Humalog to 5/4/3 before meals. Denies hypoglycemic symptoms however looking at FreeStyle Milly blood sugars low to 60s and high to 300s however last couple days mostly in the 120 to 180 ranges. \par \par Seen by Urology and ended up having cystoscopy which showed urethreal meatus stenosis since cystoscopy does not have as frequent nocturia only 3x per week opposed to 7x per week. Also prescribed mybetriq however too expensive so patient will try to get a cheaper option prescribed from Urologist who he has a follow up on 1/25.

## 2021-01-13 NOTE — PHYSICAL EXAM
[No Acute Distress] : no acute distress [Well Nourished] : well nourished [Well Developed] : well developed [Well-Appearing] : well-appearing [No Respiratory Distress] : no respiratory distress  [Clear to Auscultation] : lungs were clear to auscultation bilaterally [Normal Rate] : normal rate  [Regular Rhythm] : with a regular rhythm [Normal S1, S2] : normal S1 and S2 [No Murmur] : no murmur heard [Soft] : abdomen soft [Non Tender] : non-tender [Non-distended] : non-distended [No HSM] : no HSM [Normal Bowel Sounds] : normal bowel sounds [No Spinal Tenderness] : no spinal tenderness [Normal Gait] : normal gait [Normal Affect] : the affect was normal [Alert and Oriented x3] : oriented to person, place, and time [de-identified] : 2x2 cm right bicep lesion dryness but rim enchancing no pain/discharge

## 2021-01-13 NOTE — REVIEW OF SYSTEMS
[Incontinence] : incontinence [Nocturia] : nocturia [Skin Rash] : skin rash [Fever] : no fever [Chills] : no chills [Chest Pain] : no chest pain [Lower Ext Edema] : no lower extremity edema [Shortness Of Breath] : no shortness of breath [Cough] : no cough [Abdominal Pain] : no abdominal pain [Nausea] : no nausea [Vomiting] : no vomiting [Dysuria] : no dysuria [Joint Pain] : no joint pain [Hematuria] : no hematuria [Back Pain] : no back pain [Itching] : no itching

## 2021-01-13 NOTE — REVIEW OF SYSTEMS
[Incontinence] : incontinence [Nocturia] : nocturia [Skin Rash] : skin rash [Fever] : no fever [Chills] : no chills [Chest Pain] : no chest pain [Lower Ext Edema] : no lower extremity edema [Shortness Of Breath] : no shortness of breath [Cough] : no cough [Abdominal Pain] : no abdominal pain [Nausea] : no nausea [Vomiting] : no vomiting [Dysuria] : no dysuria [Hematuria] : no hematuria [Joint Pain] : no joint pain [Back Pain] : no back pain [Itching] : no itching

## 2021-01-13 NOTE — HISTORY OF PRESENT ILLNESS
[FreeTextEntry1] : Mr. CARVAJAL is a 45 year M with a PMH of T1DM (A1C 7.4% 9/20), Hypothyroidism, HLD here for a follow up. [de-identified] : Patient reports feeling well. However, did notice a 2 x 2 cm right arm circular lesion near the elbow that came up about a few months ago. Not pruritic, no purulence or painful. Has not changed in size or color for the past 2 months. No recent soaps, detergents. Has a pet cat. no history of eczema however patient said his mother told him he had something similar when he was very young on his leg. He has no other rashes anywhere else. No recent fevers or chills. Has not gone hiking recently or no recent tick bites. Has not placed his FreeStyle Milly in that area and does not inject insulin near that area. \par \par In regards to DM management patient recently seen by endocrine  increased Basaglar 24 units sq QHS and adjust Humalog to 5/4/3 before meals. Denies hypoglycemic symptoms however looking at FreeStyle Milly blood sugars low to 60s and high to 300s however last couple days mostly in the 120 to 180 ranges. \par \par Seen by Urology and ended up having cystoscopy which showed urethreal meatus stenosis since cystoscopy does not have as frequent nocturia only 3x per week opposed to 7x per week. Also prescribed mybetriq however too expensive so patient will try to get a cheaper option prescribed from Urologist who he has a follow up on 1/25.

## 2021-01-14 LAB
ALBUMIN SERPL ELPH-MCNC: 4.5 G/DL — SIGNIFICANT CHANGE UP (ref 3.3–5)
ALP SERPL-CCNC: 84 U/L — SIGNIFICANT CHANGE UP (ref 40–120)
ALT FLD-CCNC: 27 U/L — SIGNIFICANT CHANGE UP (ref 10–45)
ANION GAP SERPL CALC-SCNC: 17 MMOL/L — SIGNIFICANT CHANGE UP (ref 5–17)
AST SERPL-CCNC: 24 U/L — SIGNIFICANT CHANGE UP (ref 10–40)
B BURGDOR AB SER-IMP: POSITIVE
B BURGDOR18KD IGG SER QL IB: PRESENT
B BURGDOR23KD IGG SER QL IB: SIGNIFICANT CHANGE UP
B BURGDOR23KD IGM SER QL IB: SIGNIFICANT CHANGE UP
B BURGDOR28KD IGG SER QL IB: SIGNIFICANT CHANGE UP
B BURGDOR30KD IGG SER QL IB: PRESENT
B BURGDOR31KD IGG SER QL IB: SIGNIFICANT CHANGE UP
B BURGDOR39KD IGG SER QL IB: SIGNIFICANT CHANGE UP
B BURGDOR39KD IGM SER QL IB: SIGNIFICANT CHANGE UP
B BURGDOR41KD IGG SER QL IB: PRESENT
B BURGDOR41KD IGM SER QL IB: PRESENT
B BURGDOR45KD IGG SER QL IB: SIGNIFICANT CHANGE UP
B BURGDOR58KD IGG SER QL IB: PRESENT
B BURGDOR66KD IGG SER QL IB: SIGNIFICANT CHANGE UP
B BURGDOR93KD IGG SER QL IB: PRESENT
BILIRUB SERPL-MCNC: 0.2 MG/DL — SIGNIFICANT CHANGE UP (ref 0.2–1.2)
BUN SERPL-MCNC: 19 MG/DL — SIGNIFICANT CHANGE UP (ref 7–23)
CALCIUM SERPL-MCNC: 9.2 MG/DL — SIGNIFICANT CHANGE UP (ref 8.4–10.5)
CHLORIDE SERPL-SCNC: 103 MMOL/L — SIGNIFICANT CHANGE UP (ref 96–108)
CO2 SERPL-SCNC: 23 MMOL/L — SIGNIFICANT CHANGE UP (ref 22–31)
CREAT SERPL-MCNC: 0.77 MG/DL — SIGNIFICANT CHANGE UP (ref 0.5–1.3)
GLUCOSE SERPL-MCNC: 75 MG/DL — SIGNIFICANT CHANGE UP (ref 70–99)
HCT VFR BLD CALC: 45.4 % — SIGNIFICANT CHANGE UP (ref 39–50)
HGB BLD-MCNC: 13.6 G/DL — SIGNIFICANT CHANGE UP (ref 13–17)
LYME WB IGM INTERPRETATION: NEGATIVE — SIGNIFICANT CHANGE UP
MCHC RBC-ENTMCNC: 29.8 PG — SIGNIFICANT CHANGE UP (ref 27–34)
MCHC RBC-ENTMCNC: 30 GM/DL — LOW (ref 32–36)
MCV RBC AUTO: 99.3 FL — SIGNIFICANT CHANGE UP (ref 80–100)
PLATELET # BLD AUTO: 333 K/UL — SIGNIFICANT CHANGE UP (ref 150–400)
POTASSIUM SERPL-MCNC: 4.4 MMOL/L — SIGNIFICANT CHANGE UP (ref 3.5–5.3)
POTASSIUM SERPL-SCNC: 4.4 MMOL/L — SIGNIFICANT CHANGE UP (ref 3.5–5.3)
PROT SERPL-MCNC: 7.2 G/DL — SIGNIFICANT CHANGE UP (ref 6–8.3)
RBC # BLD: 4.57 M/UL — SIGNIFICANT CHANGE UP (ref 4.2–5.8)
RBC # FLD: 13.7 % — SIGNIFICANT CHANGE UP (ref 10.3–14.5)
SODIUM SERPL-SCNC: 143 MMOL/L — SIGNIFICANT CHANGE UP (ref 135–145)
WBC # BLD: 9.64 K/UL — SIGNIFICANT CHANGE UP (ref 3.8–10.5)
WBC # FLD AUTO: 9.64 K/UL — SIGNIFICANT CHANGE UP (ref 3.8–10.5)

## 2021-01-15 ENCOUNTER — NON-APPOINTMENT (OUTPATIENT)
Age: 46
End: 2021-01-15

## 2021-01-15 PROBLEM — A69.20 ERYTHEMA MIGRANS (LYME DISEASE): Status: ACTIVE | Noted: 2021-01-15

## 2021-01-19 DIAGNOSIS — E10.9 TYPE 1 DIABETES MELLITUS WITHOUT COMPLICATIONS: ICD-10-CM

## 2021-01-19 DIAGNOSIS — R21 RASH AND OTHER NONSPECIFIC SKIN ERUPTION: ICD-10-CM

## 2021-01-19 DIAGNOSIS — N39.44 NOCTURNAL ENURESIS: ICD-10-CM

## 2021-01-19 DIAGNOSIS — E03.9 HYPOTHYROIDISM, UNSPECIFIED: ICD-10-CM

## 2021-01-25 ENCOUNTER — OUTPATIENT (OUTPATIENT)
Dept: OUTPATIENT SERVICES | Facility: HOSPITAL | Age: 46
LOS: 1 days | End: 2021-01-25
Payer: SELF-PAY

## 2021-01-25 ENCOUNTER — APPOINTMENT (OUTPATIENT)
Dept: UROLOGY | Facility: HOSPITAL | Age: 46
End: 2021-01-25

## 2021-01-25 VITALS
SYSTOLIC BLOOD PRESSURE: 123 MMHG | WEIGHT: 215 LBS | RESPIRATION RATE: 14 BRPM | HEIGHT: 64 IN | BODY MASS INDEX: 36.7 KG/M2 | DIASTOLIC BLOOD PRESSURE: 76 MMHG | HEART RATE: 96 BPM | TEMPERATURE: 97.3 F

## 2021-01-25 DIAGNOSIS — R30.0 DYSURIA: ICD-10-CM

## 2021-01-25 DIAGNOSIS — N39.44 NOCTURNAL ENURESIS: ICD-10-CM

## 2021-01-25 PROCEDURE — G0463: CPT

## 2021-01-25 PROCEDURE — 51798 US URINE CAPACITY MEASURE: CPT

## 2021-01-25 NOTE — ASSESSMENT
[FreeTextEntry1] : 44yo M with primary nocturnal enuresis \par Tried flomax for nocturnal enuresis with no improvement. mirabegron sent, but pt could not afford. \par 1/4/2021 cysto showed meatal stenosis. PVR today 150cc\par \par -- oxybutynin 5mg QHS (30 day supply with refills)\par -- FU in 2 weeks for PVR and to re-eval symptoms \par \par

## 2021-01-25 NOTE — REVIEW OF SYSTEMS
[Incontinence] : incontinence [Nocturia] : nocturia [Fever] : no fever [Chills] : no chills [Chest Pain] : no chest pain [Shortness Of Breath] : no shortness of breath [Abdominal Pain] : no abdominal pain [Dysuria] : no dysuria

## 2021-01-25 NOTE — HISTORY OF PRESENT ILLNESS
[FreeTextEntry1] : 45yoM with hx of DM1 (last A1c 7.4%), hypothyroidism presents with complaints of nocturnal enuresis. Patient states that he has never had nighttime continence, but he feels as if it is worsening. States he is not dry on any night, and never has been, but now notices a higher volume of urine leakage. Does not wear pads or diapers. States he is dry during the day and voids 6x daily.\par \par Endorses straining to void, denies hesitancy, intermittency, urgency, weak stream. Denies dysuria, hematuria. Was given Flomax 0.4 by his PCP, he has taken this for a month and does not feel that it has helped.\par \par PVR in office today ~150cc\par \par cysto performed for microhematuria work up. Showed meatal stenosis but otherwise negative. Pt states symptoms did not improve after cysto. Rx for mirabegron was sent, but too expensive, so not tried. \par \par \par \par

## 2021-01-25 NOTE — PHYSICAL EXAM
[Normal Appearance] : normal appearance [General Appearance - In No Acute Distress] : no acute distress [Abdomen Soft] : soft [Abdomen Tenderness] : non-tender [Costovertebral Angle Tenderness] : no ~M costovertebral angle tenderness [] : no respiratory distress [Oriented To Time, Place, And Person] : oriented to person, place, and time [FreeTextEntry1] : PVR 150cc

## 2021-02-08 ENCOUNTER — APPOINTMENT (OUTPATIENT)
Dept: UROLOGY | Facility: HOSPITAL | Age: 46
End: 2021-02-08

## 2021-02-08 ENCOUNTER — OUTPATIENT (OUTPATIENT)
Dept: OUTPATIENT SERVICES | Facility: HOSPITAL | Age: 46
LOS: 1 days | End: 2021-02-08
Payer: SELF-PAY

## 2021-02-08 VITALS
HEART RATE: 102 BPM | TEMPERATURE: 97.8 F | SYSTOLIC BLOOD PRESSURE: 111 MMHG | HEIGHT: 64 IN | RESPIRATION RATE: 14 BRPM | DIASTOLIC BLOOD PRESSURE: 72 MMHG | BODY MASS INDEX: 36.54 KG/M2 | WEIGHT: 214 LBS

## 2021-02-08 DIAGNOSIS — R35.1 NOCTURIA: ICD-10-CM

## 2021-02-08 DIAGNOSIS — N39.44 NOCTURNAL ENURESIS: ICD-10-CM

## 2021-02-08 DIAGNOSIS — R06.83 SNORING: ICD-10-CM

## 2021-02-08 DIAGNOSIS — R30.0 DYSURIA: ICD-10-CM

## 2021-02-08 PROCEDURE — G0463: CPT

## 2021-02-08 PROCEDURE — 51798 US URINE CAPACITY MEASURE: CPT

## 2021-02-08 NOTE — PHYSICAL EXAM
[General Appearance - Well Developed] : well developed [General Appearance - Well Nourished] : well nourished [Abdomen Soft] : soft [Abdomen Tenderness] : non-tender [Skin Color & Pigmentation] : normal skin color and pigmentation [] : no respiratory distress [Exaggerated Use Of Accessory Muscles For Inspiration] : no accessory muscle use [Oriented To Time, Place, And Person] : oriented to person, place, and time [Affect] : the affect was normal [Normal Station and Gait] : the gait and station were normal for the patient's age [FreeTextEntry1] : No ankle edema

## 2021-02-08 NOTE — ASSESSMENT
[FreeTextEntry1] : 44yo M with primary nocturnal enuresis, 1/4/2021 cysto showed meatal stenosis\par Tried flomax for nocturnal enuresis with no improvement. mirabegron sent, but pt could not afford.  No improvement with oxybutynin.\par \par - Stop oxybutynin\par - Complete voiding diary x 3 days\par - Will refer to [facility] for urodynamics\par - Patient endorses snoring, will refer for sleep study to eval for potential cause of enuresis\par - Patient eats dinner at 6pm and stops drinking at 7pm, goes to bed between 11 and midnight.  \par - Does not consume caffeine or alcohol\par - Will return to voiding diary, if nocturnal polyuria index >33% will continue workup as per  AUA nocturia workup flowsheet\par \par \par

## 2021-02-08 NOTE — HISTORY OF PRESENT ILLNESS
[Nocturia] : nocturia [FreeTextEntry1] : 45yoM with hx of DM1 (last A1c 7.4%), hypothyroidism presents with complaints of nocturnal enuresis. Patient states that he has never had nighttime continence, but he feels as if it is worsening. States he is not dry on any night, and never has been, but now notices a higher volume of urine leakage. Does not wear pads or diapers. States he is dry during the day and voids 6x daily.\par \par Endorses straining to void, denies hesitancy, intermittency, urgency, weak stream. Denies dysuria, hematuria. Was given Flomax 0.4 by his PCP, he has taken this for a month and does not feel that it has helped.  PVR in office ~150cc\par \par cysto performed for microhematuria work up. Showed meatal stenosis but otherwise negative. Pt states symptoms did not improve after cysto. Rx for mirabegron was sent, but too expensive, so not tried.  Started oxybutynin\par \par Interval events:  Patient was taking oxybutynin 5mg at night with no improvement.  Post void residual today was >400, had patient void again and PVR ~60cc.\par \par - Patient endorses snoring, will refer for sleep study to eval for potential cause of enuresis\par - Patient has no ankle swelling, no need to elevate legs 1 hour before bed\par - Patient eats dinner at 6pm and stops drinking at 7pm, goes to bed between 11 and midnight.  \par - Does not consume caffeine or alcohol\par \par  [Urinary Incontinence] : no urinary incontinence [Urinary Frequency] : no urinary frequency [Straining] : no straining [Dysuria] : no dysuria [Hematuria - Gross] : no gross hematuria

## 2021-02-12 ENCOUNTER — APPOINTMENT (OUTPATIENT)
Dept: UROLOGY | Facility: CLINIC | Age: 46
End: 2021-02-12

## 2021-02-12 ENCOUNTER — OUTPATIENT (OUTPATIENT)
Dept: OUTPATIENT SERVICES | Facility: HOSPITAL | Age: 46
LOS: 1 days | End: 2021-02-12

## 2021-02-12 DIAGNOSIS — F98.0 ENURESIS NOT DUE TO A SUBSTANCE OR KNOWN PHYSIOLOGICAL CONDITION: ICD-10-CM

## 2021-02-12 DIAGNOSIS — R35.0 FREQUENCY OF MICTURITION: ICD-10-CM

## 2021-02-12 DIAGNOSIS — R35.1 NOCTURIA: ICD-10-CM

## 2021-02-12 DIAGNOSIS — E10.9 TYPE 1 DIABETES MELLITUS WITHOUT COMPLICATIONS: ICD-10-CM

## 2021-02-25 ENCOUNTER — NON-APPOINTMENT (OUTPATIENT)
Age: 46
End: 2021-02-25

## 2021-03-15 ENCOUNTER — OUTPATIENT (OUTPATIENT)
Dept: OUTPATIENT SERVICES | Facility: HOSPITAL | Age: 46
LOS: 1 days | End: 2021-03-15
Payer: SELF-PAY

## 2021-03-15 ENCOUNTER — APPOINTMENT (OUTPATIENT)
Dept: UROLOGY | Facility: HOSPITAL | Age: 46
End: 2021-03-15

## 2021-03-15 VITALS
WEIGHT: 214 LBS | SYSTOLIC BLOOD PRESSURE: 122 MMHG | HEART RATE: 76 BPM | RESPIRATION RATE: 16 BRPM | TEMPERATURE: 97.6 F | BODY MASS INDEX: 36.54 KG/M2 | HEIGHT: 64 IN | DIASTOLIC BLOOD PRESSURE: 76 MMHG

## 2021-03-15 DIAGNOSIS — N39.44 NOCTURNAL ENURESIS: ICD-10-CM

## 2021-03-15 DIAGNOSIS — R30.0 DYSURIA: ICD-10-CM

## 2021-03-15 PROCEDURE — G0463: CPT

## 2021-03-15 NOTE — ASSESSMENT
[FreeTextEntry1] : 44yo M with primary nocturnal enuresis, 1/4/2021 cysto showed meatal stenosis\par Tried flomax for nocturnal enuresis with no improvement. mirabegron sent, but pt could not afford. No improvement with oxybutynin. Urodynamics showing some staccato voiding but at safe pressures, without obstruction, adequate emptying. \par \par - Discussed behavior methods\par - Rec nothing to drink after dinner and double voiding prior to bed\par - Discussed voiding diary, will fill out prior to visit\par - Will return with voiding diary, if nocturnal polyuria index >33% will continue workup as per AUA nocturia workup flowsheet\par - RTC in 4-6 moths for symptom check and voiding diary

## 2021-03-15 NOTE — HISTORY OF PRESENT ILLNESS
[FreeTextEntry1] : 45yoM with hx of DM1 (last A1c 7.4%), hypothyroidism presents with complaints of nocturnal enuresis. Patient states that he has never had nighttime continence, but he feels as if it is worsening. States he is not dry on any night, and never has been, but now notices a higher volume of urine leakage. Does not wear pads or diapers. States he is dry during the day and voids 6x daily.\par \par Endorses straining to void, denies hesitancy, intermittency, urgency, weak stream. Denies dysuria, hematuria. Was given Flomax 0.4 by his PCP, he has taken this for a month and does not feel that it has helped. PVR in office ~150cc\par \par cysto performed for microhematuria work up. Showed meatal stenosis but otherwise negative. Pt states symptoms did not improve after cysto. Rx for mirabegron was sent, but too expensive, so not tried. Started oxybutynin\par \par Interval events: \par - S/p Urodynamics at [facility] - some staccato voiding at safe pressures, no obstruction, adequate emptying\par - Still wet most nights, small to moderate volume\par - Did not fill out voiding diary - re-discussed at length\par - Not double voiding prior to bed

## 2021-03-15 NOTE — PHYSICAL EXAM
[General Appearance - In No Acute Distress] : no acute distress [Bowel Sounds] : normal bowel sounds [Abdomen Soft] : soft [Costovertebral Angle Tenderness] : no ~M costovertebral angle tenderness [Skin Color & Pigmentation] : normal skin color and pigmentation [Edema] : no peripheral edema [Exaggerated Use Of Accessory Muscles For Inspiration] : no accessory muscle use [Normal Station and Gait] : the gait and station were normal for the patient's age

## 2021-04-01 ENCOUNTER — APPOINTMENT (OUTPATIENT)
Dept: ENDOCRINOLOGY | Facility: HOSPITAL | Age: 46
End: 2021-04-01

## 2021-04-01 ENCOUNTER — RESULT CHARGE (OUTPATIENT)
Age: 46
End: 2021-04-01

## 2021-04-01 ENCOUNTER — OUTPATIENT (OUTPATIENT)
Dept: OUTPATIENT SERVICES | Facility: HOSPITAL | Age: 46
LOS: 1 days | End: 2021-04-01
Payer: SELF-PAY

## 2021-04-01 VITALS
WEIGHT: 214 LBS | RESPIRATION RATE: 14 BRPM | TEMPERATURE: 98 F | BODY MASS INDEX: 36.54 KG/M2 | HEIGHT: 64 IN | SYSTOLIC BLOOD PRESSURE: 118 MMHG | HEART RATE: 93 BPM | DIASTOLIC BLOOD PRESSURE: 76 MMHG

## 2021-04-01 DIAGNOSIS — E11.65 TYPE 2 DIABETES MELLITUS WITH HYPERGLYCEMIA: ICD-10-CM

## 2021-04-01 LAB — GLUCOSE BLDC GLUCOMTR-MCNC: 169

## 2021-04-01 PROCEDURE — 84443 ASSAY THYROID STIM HORMONE: CPT

## 2021-04-01 PROCEDURE — 84439 ASSAY OF FREE THYROXINE: CPT

## 2021-04-01 PROCEDURE — 83036 HEMOGLOBIN GLYCOSYLATED A1C: CPT

## 2021-04-01 PROCEDURE — G0463: CPT

## 2021-04-01 PROCEDURE — 84480 ASSAY TRIIODOTHYRONINE (T3): CPT

## 2021-04-01 PROCEDURE — 82962 GLUCOSE BLOOD TEST: CPT

## 2021-04-06 NOTE — PHYSICAL EXAM
[Alert] : alert [Well Nourished] : well nourished [No Acute Distress] : no acute distress [Well Developed] : well developed [Normal Sclera/Conjunctiva] : normal sclera/conjunctiva [No Proptosis] : no proptosis [Normal Hearing] : hearing was normal [Thyroid Not Enlarged] : the thyroid was not enlarged [No Thyroid Nodules] : no palpable thyroid nodules [No Respiratory Distress] : no respiratory distress [Clear to Auscultation] : lungs were clear to auscultation bilaterally [Normal S1, S2] : normal S1 and S2 [Normal Rate] : heart rate was normal [Pedal Pulses Normal] : the pedal pulses are present [Normal Bowel Sounds] : normal bowel sounds [Not Tender] : non-tender [Not Distended] : not distended [Soft] : abdomen soft [Normal Gait] : normal gait [No Clubbing, Cyanosis] : no clubbing  or cyanosis of the fingernails [No Rash] : no rash [Right Foot Was Examined] : right foot ~C was examined [Left Foot Was Examined] : left foot ~C was examined [Full ROM] : with full range of motion [2+] : 2+ in the dorsalis pedis [No Motor Deficits] : the motor exam was normal [No Tremors] : no tremors [Oriented x3] : oriented to person, place, and time [Normal Affect] : the affect was normal [Normal Mood] : the mood was normal [Normal Outer Ear/Nose] : the ears and nose were normal in appearance [Swelling] : not swollen [Tenderness] : not tender [Erythema] : not erythematous [Diminished Throughout Both Feet] : normal tactile sensation with monofilament testing throughout both feet [de-identified] : + Chronic right leg swelling due to past surgery

## 2021-04-06 NOTE — REVIEW OF SYSTEMS
[Negative] : Endocrine [All other systems negative] : All other systems negative [Fatigue] : no fatigue [Recent Weight Gain (___ Lbs)] : no recent weight gain [Recent Weight Loss (___ Lbs)] : no recent weight loss [Eye Pain] : no pain [Blurred Vision] : no blurred vision [Chest Pain] : no chest pain [Palpitations] : no palpitations [Shortness Of Breath] : no shortness of breath [Cough] : no cough [Nausea] : no nausea [Abdominal Pain] : no abdominal pain [Vomiting] : no vomiting [Dizziness] : no dizziness [Pain/Numbness of Digits] : no pain/numbness of digits [de-identified] : + Chronic right leg swelling

## 2021-04-06 NOTE — END OF VISIT
[] : Fellow [FreeTextEntry3] : Patient seen with Dr. Bailey. Milly data reviewed with him, he appears to be over-basaled. Adjust basal bolus as described above and check A1c.

## 2021-04-06 NOTE — ASSESSMENT
[FreeTextEntry1] : 45 year old man with uncontrolled DM1, hypothyroidism, and HLD\par \par 1. DM1, uncontrolled:\par - HbA1c 7.4% 09/2020, goal is 7% or below. \par - FreeStyle Milly BG reading reviewed \par - Decrease Basaglar to 24 units sq qhs and adjust Humalog to 5/5/7 before meals. \par - Continue to scan milly 4 times a day. Asked not to self adjust doses, call if persistent highs or lows \par - Counseled about consistent carb diet, weight loss and exercise as tolerated. \par - Recommended opthalmology and podiatry follow up \par - Urine microalbumin/creatinine not detected 11/2020. Monitor annually.  \par - Transglutaminase ab: negative (9/2019), monitor routinely. \par \par 2. Hypothyroidism: \par - Clinically euthyroid. \par - TFTs from 1/2021 TSH 0.91, FT4 1.8; recheck today.\par - Continue LT4 150 mcg daily\par \par 3. HLD: \par - 1/2021 LDL 72\par - Continue Zocor 20 mg qhs\par - Recheck lipid profile annually \par \par 4. HTN:\par - BP at goal <130/80, not on any medications. \par - Urine microalbumin/creatinine not detected 11/2020. Recheck annually \par \par 5. Vit D supplement s/p right tibia and fibula fracture:  patient currently on Vit D3 1000 units daily. Normal BMD July 2018. \par \par \par RTC in 3 months. \par \par Seen and discussed with Dr Manley.

## 2021-04-06 NOTE — HISTORY OF PRESENT ILLNESS
[FreeTextEntry1] : chief complaint: DM1\par \par 45 year old man with history of uncontrolled DM1, hypothyroidism, history of non-traumatic fracture of right tibia/fibula presents for follow up of DM1. \par \par DM type 1:\par Patient was diagnosed with DM1 at age 8. He was previously on Levemir and Humalog till sept 2019, was switched over to Novolin / Humulin 70/30 due to cost issues. He was then enrolled in [facility] assistance program and was back on basal/bolus since March-April 2020. In 2021, he was able to be enrolled in [facility] and continued getting basal/bolus insulin. \par \par HbA1c 7.4 (09/2020). Not done in Jan for unclear reasons, likely order did not go through. \par \par Currently taking Basaglar 26 units qhs and Humalog 5/4/6 ac tid. Reports adherence to insulin and does not miss doses. \par Patient is using free style milly (he is paying out of pocket for Milly-60$ a month) and checking blood sugars 3-4 times a day. \par Milly reader reviewed last 30 days:\par Time in target (100-150): 21%\par Above target : 53%\par Below target: 26%\par Noted with frequent post prandial hyperglycemia after lunch and dinner and overnight and am hypoglycemia (reports he takes additional 2 units of Humalog most of the times at bedtime due to elevated BG in 300s at 9 pm) \par Walks occasionally. \par \par Eat 3 meals a day:\par Breakfast 8 am: coffee with milk, 2 slices of toast or cereal\par Lunch 12-1 pm: chicken, bread\par Dinner 6 pm: chicken, less than a cup of rice, vegetables\par No bedtime snack. \par \par No reported microvascular or macrovascular complications. \par Last opthalmology visit in May 2020, no retinopathy. \par Follows with podiatrist. No feet ulceration. \par Urine microalbumin / Cr not detected in 11/2020. \par \par Hypothyroidism: Takes 150 mcg of LT4 daily. No neck complaints - dysphagia, dysphonia, neck pain. No chest pain, palpitations, abdominal pain, nausea, vomiting, diarrhea, constipation. No heat or cold intolerance. No skin or hair changes. Weight is overall stable. TFTs from Jan 2021: TSH 0.91, FT4 1.8. \par \par HLD: takes Zocor 20 mg daily. 1/2021 LDL 72\par \par History of right tibial and fibula fracture: s/p surgery with ortho in March 2018. BMD July 2018 normal: L spine -0.9, left femoral neck -0.9, left total hip -1.1. Work up for fracture negative - vitamin D levels normal, calcium normal, TFTs normal; testosterone noted to be mildly low previously with normal LH and FSH, however testosterone was not drawn in the AM. Takes a vitamin D supplement daily - takes 1000 units daily. Does not take a calcium supplement.\par \par Received first dose of his Covid vaccine, will be getting second dose later this month.

## 2021-04-08 ENCOUNTER — NON-APPOINTMENT (OUTPATIENT)
Age: 46
End: 2021-04-08

## 2021-04-09 DIAGNOSIS — E10.9 TYPE 1 DIABETES MELLITUS WITHOUT COMPLICATIONS: ICD-10-CM

## 2021-04-09 DIAGNOSIS — E03.9 HYPOTHYROIDISM, UNSPECIFIED: ICD-10-CM

## 2021-04-09 DIAGNOSIS — E78.5 HYPERLIPIDEMIA, UNSPECIFIED: ICD-10-CM

## 2021-06-24 ENCOUNTER — APPOINTMENT (OUTPATIENT)
Dept: ENDOCRINOLOGY | Facility: HOSPITAL | Age: 46
End: 2021-06-24

## 2021-06-24 ENCOUNTER — OUTPATIENT (OUTPATIENT)
Dept: OUTPATIENT SERVICES | Facility: HOSPITAL | Age: 46
LOS: 1 days | End: 2021-06-24
Payer: SELF-PAY

## 2021-06-24 VITALS
HEART RATE: 96 BPM | DIASTOLIC BLOOD PRESSURE: 72 MMHG | SYSTOLIC BLOOD PRESSURE: 144 MMHG | TEMPERATURE: 98.1 F | BODY MASS INDEX: 34.83 KG/M2 | WEIGHT: 204 LBS | RESPIRATION RATE: 14 BRPM | HEIGHT: 64 IN

## 2021-06-24 DIAGNOSIS — E34.9 ENDOCRINE DISORDER, UNSPECIFIED: ICD-10-CM

## 2021-06-24 LAB
ALBUMIN SERPL ELPH-MCNC: 4.2 G/DL — SIGNIFICANT CHANGE UP (ref 3.3–5)
ALP SERPL-CCNC: 79 U/L — SIGNIFICANT CHANGE UP (ref 40–120)
ALT FLD-CCNC: 21 U/L — SIGNIFICANT CHANGE UP (ref 10–45)
ANION GAP SERPL CALC-SCNC: 12 MMOL/L — SIGNIFICANT CHANGE UP (ref 5–17)
AST SERPL-CCNC: 15 U/L — SIGNIFICANT CHANGE UP (ref 10–40)
BILIRUB SERPL-MCNC: 0.4 MG/DL — SIGNIFICANT CHANGE UP (ref 0.2–1.2)
BUN SERPL-MCNC: 21 MG/DL — SIGNIFICANT CHANGE UP (ref 7–23)
CALCIUM SERPL-MCNC: 9.2 MG/DL — SIGNIFICANT CHANGE UP (ref 8.4–10.5)
CHLORIDE SERPL-SCNC: 102 MMOL/L — SIGNIFICANT CHANGE UP (ref 96–108)
CO2 SERPL-SCNC: 27 MMOL/L — SIGNIFICANT CHANGE UP (ref 22–31)
CREAT SERPL-MCNC: 0.66 MG/DL — SIGNIFICANT CHANGE UP (ref 0.5–1.3)
GLUCOSE BLDC GLUCOMTR-MCNC: 126
GLUCOSE BLDC GLUCOMTR-MCNC: 126 MG/DL — HIGH (ref 70–99)
GLUCOSE SERPL-MCNC: 159 MG/DL — HIGH (ref 70–99)
POTASSIUM SERPL-MCNC: 4.3 MMOL/L — SIGNIFICANT CHANGE UP (ref 3.5–5.3)
POTASSIUM SERPL-SCNC: 4.3 MMOL/L — SIGNIFICANT CHANGE UP (ref 3.5–5.3)
PROT SERPL-MCNC: 6.9 G/DL — SIGNIFICANT CHANGE UP (ref 6–8.3)
SODIUM SERPL-SCNC: 140 MMOL/L — SIGNIFICANT CHANGE UP (ref 135–145)

## 2021-06-24 PROCEDURE — 82962 GLUCOSE BLOOD TEST: CPT

## 2021-06-24 PROCEDURE — G0463: CPT

## 2021-06-24 PROCEDURE — 80053 COMPREHEN METABOLIC PANEL: CPT

## 2021-06-24 PROCEDURE — 83036 HEMOGLOBIN GLYCOSYLATED A1C: CPT

## 2021-06-24 NOTE — PHYSICAL EXAM
[Alert] : alert [Well Nourished] : well nourished [No Acute Distress] : no acute distress [Well Developed] : well developed [Normal Sclera/Conjunctiva] : normal sclera/conjunctiva [No Proptosis] : no proptosis [Thyroid Not Enlarged] : the thyroid was not enlarged [No Thyroid Nodules] : no palpable thyroid nodules [No Respiratory Distress] : no respiratory distress [Clear to Auscultation] : lungs were clear to auscultation bilaterally [Normal S1, S2] : normal S1 and S2 [Normal Rate] : heart rate was normal [No Edema] : no peripheral edema [Pedal Pulses Normal] : the pedal pulses are present [Not Tender] : non-tender [Not Distended] : not distended [Soft] : abdomen soft [Normal Gait] : normal gait [No Rash] : no rash [Right Foot Was Examined] : right foot ~C was examined [Left Foot Was Examined] : left foot ~C was examined [Normal] : normal [2+] : 2+ in the dorsalis pedis [No Tremors] : no tremors [Oriented x3] : oriented to person, place, and time [Normal Affect] : the affect was normal [Normal Mood] : the mood was normal

## 2021-06-24 NOTE — ASSESSMENT
[Diabetes Foot Care] : diabetes foot care [Long Term Vascular Complications] : long term vascular complications of diabetes [Carbohydrate Consistent Diet] : carbohydrate consistent diet [Importance of Diet and Exercise] : importance of diet and exercise to improve glycemic control, achieve weight loss and improve cardiovascular health [Hypoglycemia Management] : hypoglycemia management [Self Monitoring of Blood Glucose] : self monitoring of blood glucose [FreeTextEntry1] : 45 year old man with uncontrolled DM1, hypothyroidism, and HLD\par \par 1. DM1, uncontrolled:\par - HbA1c 8.1% 4/2021, goal is 7% or below. Recheck today. \par - FreeStyle Milly BG reading reviewed \par - Decrease Basaglar to 21 units sq qhs and continue Humalog 5/5/7 before meals + will add correctional scale for BG >200: -300: 1 units, 301-400: 2 units, >400: 3 units and should call doctor. \par - Continue to scan milly 4 times a day. Asked not to self adjust doses, call if persistent highs or lows \par - Counseled about consistent carb diet, weight loss and exercise as tolerated. \par - Recommended opthalmology and podiatry follow up, referral provided. \par - Urine microalbumin/creatinine not detected 11/2020. Monitor annually.  \par - Transglutaminase ab: negative (9/2019), monitor routinely. \par \par 2. Hypothyroidism: \par - Clinically euthyroid. \par - TFTs from 4/2021 TSH 0.69, FT4 2.0; recheck at next visit. \par - Continue LT4 150 mcg daily\par \par 3. HLD: \par - 1/2021 LDL 72\par - Continue Zocor 20 mg qhs\par - Recheck lipid profile annually \par \par 4. HTN:\par - BP at goal <130/80, not on any medications. Noted to be slightly above goal today but has been normal in past, will continue to monitor. \par - Urine microalbumin/creatinine not detected 11/2020. Recheck annually \par \par 5. Vit D supplement s/p right tibia and fibula fracture:  patient currently on Vit D3 1000 units daily. Normal BMD July 2018. \par \par \par RTC in 3 months. \par \par Discussed with Dr Brown.

## 2021-06-24 NOTE — REVIEW OF SYSTEMS
[All other systems negative] : All other systems negative [Fatigue] : no fatigue [Recent Weight Gain (___ Lbs)] : no recent weight gain [Recent Weight Loss (___ Lbs)] : no recent weight loss [Eye Pain] : no pain [Blurred Vision] : no blurred vision [Dysphagia] : no dysphagia [Neck Pain] : no neck pain [Dysphonia] : no dysphonia [Chest Pain] : no chest pain [Palpitations] : no palpitations [Shortness Of Breath] : no shortness of breath [Cough] : no cough [Nausea] : no nausea [Constipation] : no constipation [Abdominal Pain] : no abdominal pain [Vomiting] : no vomiting [Polyuria] : no polyuria [Tremors] : no tremors [Pain/Numbness of Digits] : no pain/numbness of digits [Cold Intolerance] : no cold intolerance [Heat Intolerance] : no heat intolerance [Swelling] : no swelling

## 2021-06-24 NOTE — END OF VISIT
[] : Fellow [FreeTextEntry3] : Patient is a 45-year-old man with history of type 1 diabetes on basal bolus insulin regimen, hypothyroidism on levothyroxine 150 mcg daily.  Patient is currently taking Basaglar 24 units at bedtime, NovoLog 5 units with breakfast, 5 units with lunch, 7 units with dinner.  Patient gets the insulin via [employer] care program.  Patient uses a CGM, freestyle roel which he pays out-of-pocket.  Review of the CGM showed fasting hypoglycemia, and sometimes postprandial hyperglycemia.  Recommend to decrease Basaglar from 24 to 21 units at bedtime.  Recommend to continue with NovoLog 5/5/7 3 times daily with meals, start sliding scale at 200 mg/dL, give 1 units per 100 mg/dL above 200 mg/dL.  Patient expressed understanding of all his instructions.  UTD with ophtal and podiatry . Continue with same dose of levothyroxine.  Follow-up in 3 months.

## 2021-06-24 NOTE — HISTORY OF PRESENT ILLNESS
[FreeTextEntry1] : chief complaint: DM1\par \par 45 year old man with history of uncontrolled DM1, hypothyroidism, history of non-traumatic fracture of right tibia/fibula presents for follow up of DM1. \par \par DM type 1:\par Patient was diagnosed with DM1 at age 8. He was previously on Levemir and Humalog till sept 2019, was switched over to Novolin / Humulin 70/30 due to cost issues. He was then enrolled in [facility] assistance program and was back on basal/bolus since March-April 2020. In 2021, he was able to be enrolled in [facility] and continued getting basal/bolus insulin. \par \par HbA1c 8.1% (4/2021). \par \par Currently taking Basaglar 24 units qhs and Humalog 5/5/7 ac tid. Reports adherence to insulin and does not miss doses. \par Patient is using free style milly (he is paying out of pocket for Milly-60$ a month) and checking blood sugars 3-4 times a day. \par Milly reader reviewed last 30 days:\par Time in target (100-150): 30%\par Above target : 55%\par Below target: 15%\par Again noted with frequent post prandial hyperglycemia after lunch and dinner and overnight and am hypoglycemia (reports he takes additional 1-2 units of Humalog at bedtime due to elevated BG in 300s at 9 pm) \par Walks occasionally. \par \par Eat 3 meals a day:\par Breakfast 8 am: coffee with milk, 2 slices of toast or cereal\par Lunch 12-1 pm: chicken, bread\par Dinner 6 pm: chicken, less than a cup of rice, vegetables\par No bedtime snack. \par \par No reported microvascular or macrovascular complications. \par Last opthalmology visit in May 2020, no retinopathy. \par Follows with podiatrist. No feet ulceration. \par Urine microalbumin / Cr not detected in 11/2020. \par \par Hypothyroidism: Takes 150 mcg of LT4 daily. No neck complaints - dysphagia, dysphonia, neck pain. No chest pain, palpitations, abdominal pain, nausea, vomiting, diarrhea, constipation. No heat or cold intolerance. No skin or hair changes. Weight is overall stable. TFTs from April 2021: TSH 0.69, FT4 2.0, TT3 109. \par \par HLD: takes Zocor 20 mg daily. 1/2021 LDL 72\par \par History of right tibial and fibula fracture: s/p surgery with ortho in March 2018. BMD July 2018 normal: L spine -0.9, left femoral neck -0.9, left total hip -1.1. Work up for fracture negative - vitamin D levels normal, calcium normal, TFTs normal; testosterone noted to be mildly low previously with normal LH and FSH, however testosterone was not drawn in the AM. Takes a vitamin D supplement daily - takes 1000 units daily. Does not take a calcium supplement.\par \par Received both doses of Covid vaccine.

## 2021-06-25 DIAGNOSIS — E78.5 HYPERLIPIDEMIA, UNSPECIFIED: ICD-10-CM

## 2021-06-25 DIAGNOSIS — E03.9 HYPOTHYROIDISM, UNSPECIFIED: ICD-10-CM

## 2021-06-25 DIAGNOSIS — E10.9 TYPE 1 DIABETES MELLITUS WITHOUT COMPLICATIONS: ICD-10-CM

## 2021-06-25 LAB
A1C WITH ESTIMATED AVERAGE GLUCOSE RESULT: 7.9 % — HIGH (ref 4–5.6)
ESTIMATED AVERAGE GLUCOSE: 180 MG/DL — HIGH (ref 68–114)

## 2021-06-29 ENCOUNTER — NON-APPOINTMENT (OUTPATIENT)
Age: 46
End: 2021-06-29

## 2021-06-30 ENCOUNTER — OUTPATIENT (OUTPATIENT)
Dept: OUTPATIENT SERVICES | Facility: HOSPITAL | Age: 46
LOS: 1 days | End: 2021-06-30
Payer: SELF-PAY

## 2021-06-30 ENCOUNTER — APPOINTMENT (OUTPATIENT)
Dept: PODIATRY | Facility: HOSPITAL | Age: 46
End: 2021-06-30
Payer: COMMERCIAL

## 2021-06-30 VITALS
DIASTOLIC BLOOD PRESSURE: 72 MMHG | WEIGHT: 204 LBS | BODY MASS INDEX: 34.83 KG/M2 | HEIGHT: 64 IN | HEART RATE: 100 BPM | TEMPERATURE: 96.6 F | SYSTOLIC BLOOD PRESSURE: 125 MMHG

## 2021-06-30 DIAGNOSIS — B35.9 DERMATOPHYTOSIS, UNSPECIFIED: ICD-10-CM

## 2021-06-30 DIAGNOSIS — E10.9 TYPE 1 DIABETES MELLITUS WITHOUT COMPLICATIONS: ICD-10-CM

## 2021-06-30 DIAGNOSIS — M79.609 PAIN IN UNSPECIFIED LIMB: ICD-10-CM

## 2021-06-30 PROCEDURE — G0463: CPT

## 2021-06-30 PROCEDURE — 99212 OFFICE O/P EST SF 10 MIN: CPT

## 2021-06-30 NOTE — HISTORY OF PRESENT ILLNESS
[FreeTextEntry1] : 44 yo m presents to clinic initial evaluation for diabetic foot check, last A1C 7.8%, last PCP visit 1 month ago, pt denies any pain numbness or tingling to feet, denies nay cramping to the legs, denies F/N/V/SOB or other pedal complaints

## 2021-06-30 NOTE — ASSESSMENT
[FreeTextEntry1] : 46 yo m with diatetic foot evaluation\par -pt seen and evaluated\par - right dorsal ankle 3 annular lesions with scaling, no open lesions, no clinical signs of infection, \par -instructed pt to continue monitoring his glucose and follow up with PCP\par -instructed pt to check his feet dialy\par -RX clotrimazole \par -RTC 6 momths\par

## 2021-09-30 ENCOUNTER — OUTPATIENT (OUTPATIENT)
Dept: OUTPATIENT SERVICES | Facility: HOSPITAL | Age: 46
LOS: 1 days | End: 2021-09-30
Payer: SELF-PAY

## 2021-09-30 ENCOUNTER — APPOINTMENT (OUTPATIENT)
Dept: ENDOCRINOLOGY | Facility: HOSPITAL | Age: 46
End: 2021-09-30

## 2021-09-30 VITALS
SYSTOLIC BLOOD PRESSURE: 131 MMHG | BODY MASS INDEX: 34.83 KG/M2 | HEART RATE: 89 BPM | WEIGHT: 204 LBS | TEMPERATURE: 97.3 F | HEIGHT: 64 IN | DIASTOLIC BLOOD PRESSURE: 79 MMHG | RESPIRATION RATE: 16 BRPM

## 2021-09-30 DIAGNOSIS — S82.201S UNSPECIFIED FRACTURE OF SHAFT OF RIGHT TIBIA, SEQUELA: ICD-10-CM

## 2021-09-30 DIAGNOSIS — S82.401S UNSPECIFIED FRACTURE OF SHAFT OF RIGHT TIBIA, SEQUELA: ICD-10-CM

## 2021-09-30 DIAGNOSIS — E11.9 TYPE 2 DIABETES MELLITUS WITHOUT COMPLICATIONS: ICD-10-CM

## 2021-09-30 LAB
CREAT ?TM UR-MCNC: 128 MG/DL — SIGNIFICANT CHANGE UP
MICROALBUMIN UR-MCNC: <1.2 MG/DL — SIGNIFICANT CHANGE UP
MICROALBUMIN/CREAT UR-RTO: SIGNIFICANT CHANGE UP MG/G (ref 0–30)

## 2021-09-30 PROCEDURE — 82043 UR ALBUMIN QUANTITATIVE: CPT

## 2021-09-30 PROCEDURE — G0463: CPT

## 2021-10-03 NOTE — REVIEW OF SYSTEMS
[All other systems negative] : All other systems negative [Recent Weight Gain (___ Lbs)] : no recent weight gain [Fatigue] : no fatigue [Recent Weight Loss (___ Lbs)] : no recent weight loss [Eye Pain] : no pain [Blurred Vision] : no blurred vision [Dysphagia] : no dysphagia [Neck Pain] : no neck pain [Dysphonia] : no dysphonia [Chest Pain] : no chest pain [Palpitations] : no palpitations [Shortness Of Breath] : no shortness of breath [Cough] : no cough [Constipation] : no constipation [Nausea] : no nausea [Abdominal Pain] : no abdominal pain [Vomiting] : no vomiting [Polyuria] : no polyuria [Tremors] : no tremors [Pain/Numbness of Digits] : no pain/numbness of digits [Cold Intolerance] : no cold intolerance [Heat Intolerance] : no heat intolerance [Swelling] : no swelling

## 2021-10-03 NOTE — ASSESSMENT
[Diabetes Foot Care] : diabetes foot care [Long Term Vascular Complications] : long term vascular complications of diabetes [Carbohydrate Consistent Diet] : carbohydrate consistent diet [Importance of Diet and Exercise] : importance of diet and exercise to improve glycemic control, achieve weight loss and improve cardiovascular health [Hypoglycemia Management] : hypoglycemia management [Self Monitoring of Blood Glucose] : self monitoring of blood glucose [FreeTextEntry1] : 45 year old man with uncontrolled DM1, hypothyroidism, and HLD\par \par 1. DM1, uncontrolled:\par - HbA1c 7.9% 6/2021, goal is 7% or below. Recheck today. \par - FreeStyle Milly BG reading reviewed: pattern of post-prandial hyperglycemia most notably after lunch \par - Continue Basaglar to 21 units sq qhs and will adjust Humalog from 5/5/7 to 5/6/7 before meals. Advised pt to use the following correctional scale for BG >200 BEFORE MEALS ONLY (not at bedtime): for: -300: 1 units, 301-400: 2 units, >400: 3 units and should call doctor. \par - Continue to scan milly 4 times a day. Call if persistent highs or lows \par - Discussed consistent carb diet and regular exercise\par - Recommended opthalmology follow up (has apt in October 2021)\par - Urine microalbumin/creatinine not detected 11/2020. Recheck today.\par - Transglutaminase ab: negative (9/2019), monitor routinely. \par \par 2. Hypothyroidism: \par - Clinically euthyroid. \par - TFTs from 4/2021 TSH 0.69, FT4 2.0\par - Continue LT4 150 mcg daily\par \par 3. HLD: \par - 1/2021 LDL 72\par - Continue Zocor 20 mg qhs\par - Recheck lipid profile annually \par \par 4. Vit D supplement s/p right tibia and fibula fracture:\par  patient currently on Vit D3 1000 units daily.\par  Normal BMD July 2018. \par \par \par RTC in 3 months. \par \par Discussed with Dr. Riojas

## 2021-10-03 NOTE — HISTORY OF PRESENT ILLNESS
[FreeTextEntry1] : chief complaint: DM1\par \par 45 year old man with history of uncontrolled DM1, hypothyroidism, history of non-traumatic fracture of right tibia/fibula presents for follow up of DM1. \par \par DM type 1:\par Patient was diagnosed with DM1 at age 8. He was previously on Levemir and Humalog till Sept 2019, was switched over to Novolin / Humulin 70/30 due to cost issues. He was then enrolled in [facility] assistance program and was back on basal/bolus since March-April 2020. In 2021, he was able to be enrolled in [facility] and continued getting basal/bolus insulin. \par \par HbA1c 7.9% (6/2021), improved from  8.1% (4/2021).\par \par Currently taking Basaglar 21 units qhs, Humalog 5/5/7 ac tid, and correctional scale only at bedtime for BG >200: -300: 1 units, 301-400: 2 units, >400: 3 units and should call doctor. Reports adherence to insulin and does not miss doses. \par Patient is using free style milly (he is paying out of pocket for Milly-60$ a month) and checking blood sugars 3-4 times a day. \par Milly reader reviewed last 30 days:\par Time in target (100-150): 24%\par Above target : 65%\par Below target: 11%\par Again noted with frequent post prandial hyperglycemia after lunch and dinner. He reports he takes pre-meal admelog about 15 minutes before the meal\par Noted with low BG to 40s at 3:30 in the morning within last 7 days. Glucose at bedtime the night before was 90s.\par \par Walks every day.\par \par Eat 3 meals a day:\par Breakfast 8 am: coffee with milk, 2 slices of toast or cereal\par Lunch 12-1 pm: chicken, bread\par Dinner 6 pm: chicken, less than a cup of rice, vegetables\par No bedtime snack. \par \par No reported microvascular or macrovascular complications. \par Last opthalmology visit in May 2020, no retinopathy. Next f/u apt in October 2021. \par Follows with podiatrist. No foot ulceration. Last visit August 2021\par Urine microalbumin / Cr negative 11/2020. \par \par Hypothyroidism: Takes 150 mcg of LT4 daily. Takes every morning at 4AM on empty stomach. Does not miss doses. TFTs from April 2021: TSH 0.69, FT4 2.0, TT3 109. Denies palpitations, tremors, weight loss.\par \par HLD: takes Zocor 20 mg daily. 1/2021 LDL 72\par \par History of right tibial and fibula fracture: s/p surgery with ortho in March 2018. BMD July 2018 normal: L spine -0.9, left femoral neck -0.9, left total hip -1.1. Work up for fracture negative - vitamin D levels normal, calcium normal, TFTs normal; testosterone noted to be mildly low previously with normal LH and FSH, however testosterone was not drawn in the AM. Takes a vitamin D supplement daily - takes 1000 units daily. Does not take a calcium supplement.\par \par Received both doses of Covid vaccine.

## 2021-10-03 NOTE — END OF VISIT
[] : Fellow [FreeTextEntry3] : 46 yo M with T1DM. Adjust insulin as detailed by Dr. Sánchez due to pre-dinner hyperglycemia. Use scale with meals rather than before bedtime. If early AM hypos continue to occur will cut back basal dose/dinner humalog dose.

## 2021-10-04 DIAGNOSIS — E10.9 TYPE 1 DIABETES MELLITUS WITHOUT COMPLICATIONS: ICD-10-CM

## 2021-10-04 DIAGNOSIS — S82.201S UNSPECIFIED FRACTURE OF SHAFT OF RIGHT TIBIA, SEQUELA: ICD-10-CM

## 2021-10-04 DIAGNOSIS — E03.9 HYPOTHYROIDISM, UNSPECIFIED: ICD-10-CM

## 2021-10-04 DIAGNOSIS — E78.5 HYPERLIPIDEMIA, UNSPECIFIED: ICD-10-CM

## 2021-10-07 ENCOUNTER — OUTPATIENT (OUTPATIENT)
Dept: OUTPATIENT SERVICES | Facility: HOSPITAL | Age: 46
LOS: 1 days | End: 2021-10-07
Payer: SELF-PAY

## 2021-10-07 DIAGNOSIS — E78.5 HYPERLIPIDEMIA, UNSPECIFIED: ICD-10-CM

## 2021-10-07 DIAGNOSIS — E03.9 HYPOTHYROIDISM, UNSPECIFIED: ICD-10-CM

## 2021-10-07 DIAGNOSIS — S82.201S UNSPECIFIED FRACTURE OF SHAFT OF RIGHT TIBIA, SEQUELA: ICD-10-CM

## 2021-10-07 DIAGNOSIS — E10.9 TYPE 1 DIABETES MELLITUS WITHOUT COMPLICATIONS: ICD-10-CM

## 2021-10-07 LAB
A1C WITH ESTIMATED AVERAGE GLUCOSE RESULT: 8.4 % — HIGH (ref 4–5.6)
ESTIMATED AVERAGE GLUCOSE: 194 MG/DL — HIGH (ref 68–114)

## 2021-10-07 PROCEDURE — 83036 HEMOGLOBIN GLYCOSYLATED A1C: CPT

## 2021-10-07 PROCEDURE — 36415 COLL VENOUS BLD VENIPUNCTURE: CPT

## 2021-10-18 ENCOUNTER — APPOINTMENT (OUTPATIENT)
Dept: OPHTHALMOLOGY | Facility: CLINIC | Age: 46
End: 2021-10-18
Payer: SELF-PAY

## 2021-10-18 ENCOUNTER — NON-APPOINTMENT (OUTPATIENT)
Age: 46
End: 2021-10-18

## 2021-10-18 PROCEDURE — 92133 CPTRZD OPH DX IMG PST SGM ON: CPT

## 2021-10-18 PROCEDURE — 92014 COMPRE OPH EXAM EST PT 1/>: CPT

## 2021-10-25 ENCOUNTER — NON-APPOINTMENT (OUTPATIENT)
Age: 46
End: 2021-10-25

## 2021-10-25 ENCOUNTER — APPOINTMENT (OUTPATIENT)
Dept: OPHTHALMOLOGY | Facility: CLINIC | Age: 46
End: 2021-10-25
Payer: SELF-PAY

## 2021-10-25 PROCEDURE — 92133 CPTRZD OPH DX IMG PST SGM ON: CPT

## 2021-10-25 PROCEDURE — 99214 OFFICE O/P EST MOD 30 MIN: CPT

## 2021-10-25 PROCEDURE — 92083 EXTENDED VISUAL FIELD XM: CPT

## 2021-11-02 ENCOUNTER — NON-APPOINTMENT (OUTPATIENT)
Age: 46
End: 2021-11-02

## 2022-01-07 ENCOUNTER — RX RENEWAL (OUTPATIENT)
Age: 47
End: 2022-01-07

## 2022-01-20 ENCOUNTER — OUTPATIENT (OUTPATIENT)
Dept: OUTPATIENT SERVICES | Facility: HOSPITAL | Age: 47
LOS: 1 days | End: 2022-01-20
Payer: SELF-PAY

## 2022-01-20 ENCOUNTER — APPOINTMENT (OUTPATIENT)
Dept: ENDOCRINOLOGY | Facility: HOSPITAL | Age: 47
End: 2022-01-20
Payer: COMMERCIAL

## 2022-01-20 VITALS
DIASTOLIC BLOOD PRESSURE: 74 MMHG | HEART RATE: 102 BPM | BODY MASS INDEX: 35.17 KG/M2 | TEMPERATURE: 96.3 F | HEIGHT: 64 IN | SYSTOLIC BLOOD PRESSURE: 125 MMHG | WEIGHT: 206 LBS

## 2022-01-20 DIAGNOSIS — E03.9 HYPOTHYROIDISM, UNSPECIFIED: ICD-10-CM

## 2022-01-20 DIAGNOSIS — E11.65 TYPE 2 DIABETES MELLITUS WITH HYPERGLYCEMIA: ICD-10-CM

## 2022-01-20 DIAGNOSIS — E10.9 TYPE 1 DIABETES MELLITUS WITHOUT COMPLICATIONS: ICD-10-CM

## 2022-01-20 PROCEDURE — ZZZZZ: CPT

## 2022-01-20 PROCEDURE — G0463: CPT

## 2022-01-20 RX ORDER — SIMVASTATIN 20 MG/1
20 TABLET, FILM COATED ORAL
Qty: 90 | Refills: 3 | Status: DISCONTINUED | COMMUNITY
Start: 2018-04-19 | End: 2022-01-20

## 2022-01-20 RX ORDER — CLOTRIMAZOLE 10 MG/G
1 CREAM TOPICAL 3 TIMES DAILY
Qty: 1 | Refills: 3 | Status: DISCONTINUED | COMMUNITY
Start: 2021-06-30 | End: 2022-01-20

## 2022-01-20 RX ORDER — DOXYCYCLINE HYCLATE 100 MG/1
100 CAPSULE ORAL
Qty: 42 | Refills: 0 | Status: DISCONTINUED | COMMUNITY
Start: 2021-01-15 | End: 2022-01-20

## 2022-01-20 RX ORDER — OXYBUTYNIN CHLORIDE 5 MG/1
5 TABLET ORAL AT BEDTIME
Qty: 30 | Refills: 11 | Status: DISCONTINUED | COMMUNITY
Start: 2021-01-25 | End: 2022-01-20

## 2022-01-20 RX ORDER — MIRABEGRON 25 MG/1
25 TABLET, FILM COATED, EXTENDED RELEASE ORAL DAILY
Qty: 30 | Refills: 0 | Status: DISCONTINUED | COMMUNITY
Start: 2021-01-04 | End: 2022-01-20

## 2022-02-17 ENCOUNTER — APPOINTMENT (OUTPATIENT)
Dept: ENDOCRINOLOGY | Facility: HOSPITAL | Age: 47
End: 2022-02-17
Payer: COMMERCIAL

## 2022-02-17 ENCOUNTER — OUTPATIENT (OUTPATIENT)
Dept: OUTPATIENT SERVICES | Facility: HOSPITAL | Age: 47
LOS: 1 days | End: 2022-02-17
Payer: SELF-PAY

## 2022-02-17 VITALS
SYSTOLIC BLOOD PRESSURE: 102 MMHG | TEMPERATURE: 96.3 F | BODY MASS INDEX: 34.49 KG/M2 | DIASTOLIC BLOOD PRESSURE: 64 MMHG | WEIGHT: 202 LBS | HEIGHT: 64 IN

## 2022-02-17 DIAGNOSIS — I10 ESSENTIAL (PRIMARY) HYPERTENSION: ICD-10-CM

## 2022-02-17 DIAGNOSIS — E11.9 TYPE 2 DIABETES MELLITUS WITHOUT COMPLICATIONS: ICD-10-CM

## 2022-02-17 DIAGNOSIS — E03.9 HYPOTHYROIDISM, UNSPECIFIED: ICD-10-CM

## 2022-02-17 DIAGNOSIS — E78.5 HYPERLIPIDEMIA, UNSPECIFIED: ICD-10-CM

## 2022-02-17 DIAGNOSIS — E10.9 TYPE 1 DIABETES MELLITUS WITHOUT COMPLICATIONS: ICD-10-CM

## 2022-02-17 LAB
A1C WITH ESTIMATED AVERAGE GLUCOSE RESULT: 8.8 % — HIGH (ref 4–5.6)
ALBUMIN SERPL ELPH-MCNC: 4.6 G/DL — SIGNIFICANT CHANGE UP (ref 3.3–5)
ALBUMIN, RANDOM URINE: <1.2 MG/DL — SIGNIFICANT CHANGE UP
ALBUMIN/CREATININE RATIO (ACR): SIGNIFICANT CHANGE UP MG/G (ref 0–30)
ALP SERPL-CCNC: 86 U/L — SIGNIFICANT CHANGE UP (ref 40–120)
ALT FLD-CCNC: 22 U/L — SIGNIFICANT CHANGE UP (ref 10–45)
ANION GAP SERPL CALC-SCNC: 15 MMOL/L — SIGNIFICANT CHANGE UP (ref 5–17)
AST SERPL-CCNC: 18 U/L — SIGNIFICANT CHANGE UP (ref 10–40)
BILIRUB SERPL-MCNC: 0.4 MG/DL — SIGNIFICANT CHANGE UP (ref 0.2–1.2)
BUN SERPL-MCNC: 18 MG/DL — SIGNIFICANT CHANGE UP (ref 7–23)
CALCIUM SERPL-MCNC: 8.9 MG/DL — SIGNIFICANT CHANGE UP (ref 8.4–10.5)
CHLORIDE SERPL-SCNC: 101 MMOL/L — SIGNIFICANT CHANGE UP (ref 96–108)
CHOLEST SERPL-MCNC: 178 MG/DL — SIGNIFICANT CHANGE UP
CO2 SERPL-SCNC: 26 MMOL/L — SIGNIFICANT CHANGE UP (ref 22–31)
CREAT ?TM UR-MCNC: 100 MG/DL — SIGNIFICANT CHANGE UP
CREAT SERPL-MCNC: 0.74 MG/DL — SIGNIFICANT CHANGE UP (ref 0.5–1.3)
ESTIMATED AVERAGE GLUCOSE: 206 MG/DL — HIGH (ref 68–114)
GLUCOSE SERPL-MCNC: 76 MG/DL — SIGNIFICANT CHANGE UP (ref 70–99)
HDLC SERPL-MCNC: 77 MG/DL — SIGNIFICANT CHANGE UP
LIPID PNL WITH DIRECT LDL SERPL: 88 MG/DL — SIGNIFICANT CHANGE UP
NON HDL CHOLESTEROL: 101 MG/DL — SIGNIFICANT CHANGE UP
POTASSIUM SERPL-MCNC: 4.3 MMOL/L — SIGNIFICANT CHANGE UP (ref 3.5–5.3)
POTASSIUM SERPL-SCNC: 4.3 MMOL/L — SIGNIFICANT CHANGE UP (ref 3.5–5.3)
PROT SERPL-MCNC: 7.3 G/DL — SIGNIFICANT CHANGE UP (ref 6–8.3)
SODIUM SERPL-SCNC: 143 MMOL/L — SIGNIFICANT CHANGE UP (ref 135–145)
T4 FREE SERPL-MCNC: 1.6 NG/DL — SIGNIFICANT CHANGE UP (ref 0.9–1.8)
TRIGL SERPL-MCNC: 63 MG/DL — SIGNIFICANT CHANGE UP

## 2022-02-17 PROCEDURE — 80061 LIPID PANEL: CPT

## 2022-02-17 PROCEDURE — ZZZZZ: CPT | Mod: GC

## 2022-02-17 PROCEDURE — 80053 COMPREHEN METABOLIC PANEL: CPT

## 2022-02-17 PROCEDURE — 84439 ASSAY OF FREE THYROXINE: CPT

## 2022-02-17 PROCEDURE — 83036 HEMOGLOBIN GLYCOSYLATED A1C: CPT

## 2022-02-17 PROCEDURE — 82043 UR ALBUMIN QUANTITATIVE: CPT

## 2022-02-17 PROCEDURE — 84443 ASSAY THYROID STIM HORMONE: CPT

## 2022-02-17 PROCEDURE — G0463: CPT

## 2022-02-17 RX ORDER — URINE ACETONE TEST STRIPS
STRIP MISCELLANEOUS
Qty: 1 | Refills: 0 | Status: ACTIVE | COMMUNITY
Start: 2022-02-17 | End: 1900-01-01

## 2022-02-17 NOTE — PHYSICAL EXAM
[Alert] : alert [No Acute Distress] : no acute distress [No Neck Mass] : no neck mass was observed [No LAD] : no lymphadenopathy [Thyroid Not Enlarged] : the thyroid was not enlarged [No Thyroid Nodules] : no palpable thyroid nodules [No Respiratory Distress] : no respiratory distress [No Murmurs] : no murmurs [Normal Rate] : heart rate was normal [Oriented x3] : oriented to person, place, and time

## 2022-02-18 LAB — TSH SERPL-MCNC: 2.05 UIU/ML — SIGNIFICANT CHANGE UP (ref 0.27–4.2)

## 2022-02-22 NOTE — ASSESSMENT
[FreeTextEntry1] : 46 year old man with history of uncontrolled DM1, hypothyroidism, history of non-traumatic fracture of right tibia/fibula presents for follow up of DM1. \par \par 46 year old man with uncontrolled DM1, hypothyroidism, and HLD\par \par 1. DM1, uncontrolled:\par HbA1c 8.4% 11/2021, goal is 7% or below.\par FreeStyle Milly BG reading reviewed: overview of graph trends show large bedtime spikes and quick AM drops. He also has significant response to hypoglycemia corrections, and large spikes with meals.\par -Decrease Basaglar to 19 units sq qhs and continue 4/5/5 before meals for now. Will call in a few weeks to check \par - Discussed consistent carb diet\par - UTD with opthalmology and podiatry\par - Urine microalbumin/creatinine not detected 11/2021.\par - Sent ketone strips. He has glucagon at home- needs Medic ID\par \par 2. Hypothyroidism: \par - Clinically euthyroid. \par - TFTs from 4/2021 TSH 0.69, FT4 2.0\par - Continue LT4 150 mcg daily\par - Repeat TFTs today\par \par 3. HLD: \par - 1/2021 LDL 72\par - Continue Zocor 20 mg qhs\par - Recheck lipid profile in 4 weeks at f/u appointment\par \par 4. Vit D supplement s/p right tibia and fibula fracture:\par  patient currently on Vit D3 1000 units daily. \par  Normal BMD July 2018. \par \par RTC in 3 months\par \par Discussed with Dr. Garcia\par \par Alecia Barth MD\par Endocrine Fellow [Diabetes Foot Care] : diabetes foot care [Long Term Vascular Complications] : long term vascular complications of diabetes [Carbohydrate Consistent Diet] : carbohydrate consistent diet [Importance of Diet and Exercise] : importance of diet and exercise to improve glycemic control, achieve weight loss and improve cardiovascular health [Exercise/Effect on Glucose] : exercise/effect on glucose [Hypoglycemia Management] : hypoglycemia management [Glucagon Use] : glucagon use [Ketone Testing] : ketone testing [Action and use of Insulin] : action and use of short and long-acting insulin [Self Monitoring of Blood Glucose] : self monitoring of blood glucose [Insulin Self-Administration] : insulin self-administration

## 2022-02-22 NOTE — END OF VISIT
[] : Fellow [FreeTextEntry3] : pt seen and examined with the fellow.\par case discussed as above.\par Plan of care discussed with the pt and the fellow.  \par \par \par

## 2022-02-22 NOTE — HISTORY OF PRESENT ILLNESS
[FreeTextEntry1] : 46 year old man with history of uncontrolled DM1, hypothyroidism, history of non-traumatic fracture of right tibia/fibula presents for follow up of DM1. \par \par DM type 1:\par Patient was diagnosed with DM1 at age 8. A1c 8.4% in November 2021. Currently on Basaglar 21 units qHS and Humalog 4/5/5, through [facility]. He endorses strict compliance, and takes Admelog 5-10 minutes before the meal, never in between the meal. Uses a freestyle liibre for glucose monitoring. Download not available, but overview of graph trends show large bedtime spikes and quick AM drops. He also has significant response to hypoglycemia corrections, and large spikes with meals. Does not feel symptomatic until FS < 70. Diet is average, however he does have carb rich meals at times, which lead to the spikes. \par \par Walks every day.\par \par No reported microvascular other than retinopathy, saw optho Oct 2021. No macrovascular complications. \par Follows with podiatrist. No foot ulceration. Last visit August 2021\par Urine microalbumin / Cr negative 11/2021. \par \par Hypothyroidism: Takes 150 mcg of LT4 daily. Takes every morning at 4AM on empty stomach. Does not miss doses. Last TFTs wnl Denies palpitations, tremors, weight loss.\par \par HLD: takes Zocor 20 mg daily. 1/2021 LDL 72\par \par History of right tibial and fibula fracture: s/p surgery with ortho in March 2018. BMD July 2018 normal: L spine -0.9, left femoral neck -0.9, left total hip -1.1. Work up for fracture negative - vitamin D levels normal, calcium normal, TFTs normal; testosterone noted to be mildly low previously with normal LH and FSH, however testosterone was not drawn in the AM. Patient with normal libido and energy levels at this time. Takes a vitamin D supplement daily - takes 1000 units daily. Does not take a calcium supplement. No interval fxs.\par \par Received both doses of Covid vaccine. \par

## 2022-05-26 ENCOUNTER — OUTPATIENT (OUTPATIENT)
Dept: OUTPATIENT SERVICES | Facility: HOSPITAL | Age: 47
LOS: 1 days | End: 2022-05-26
Payer: SELF-PAY

## 2022-05-26 ENCOUNTER — APPOINTMENT (OUTPATIENT)
Dept: ENDOCRINOLOGY | Facility: HOSPITAL | Age: 47
End: 2022-05-26
Payer: COMMERCIAL

## 2022-05-26 VITALS
HEIGHT: 64.5 IN | TEMPERATURE: 96.4 F | WEIGHT: 193 LBS | BODY MASS INDEX: 32.55 KG/M2 | DIASTOLIC BLOOD PRESSURE: 75 MMHG | HEART RATE: 109 BPM | SYSTOLIC BLOOD PRESSURE: 111 MMHG

## 2022-05-26 DIAGNOSIS — R21 RASH AND OTHER NONSPECIFIC SKIN ERUPTION: ICD-10-CM

## 2022-05-26 DIAGNOSIS — E78.5 HYPERLIPIDEMIA, UNSPECIFIED: ICD-10-CM

## 2022-05-26 DIAGNOSIS — E10.9 TYPE 1 DIABETES MELLITUS WITHOUT COMPLICATIONS: ICD-10-CM

## 2022-05-26 DIAGNOSIS — E11.65 TYPE 2 DIABETES MELLITUS WITH HYPERGLYCEMIA: ICD-10-CM

## 2022-05-26 DIAGNOSIS — E03.9 HYPOTHYROIDISM, UNSPECIFIED: ICD-10-CM

## 2022-05-26 LAB
A1C WITH ESTIMATED AVERAGE GLUCOSE RESULT: 9.2 % — HIGH (ref 4–5.6)
ESTIMATED AVERAGE GLUCOSE: 217 MG/DL — HIGH (ref 68–114)

## 2022-05-26 PROCEDURE — G0463: CPT

## 2022-05-26 PROCEDURE — ZZZZZ: CPT | Mod: GC

## 2022-05-26 PROCEDURE — 36415 COLL VENOUS BLD VENIPUNCTURE: CPT

## 2022-05-26 PROCEDURE — 83036 HEMOGLOBIN GLYCOSYLATED A1C: CPT

## 2022-05-26 RX ORDER — TAMSULOSIN HYDROCHLORIDE 0.4 MG/1
0.4 CAPSULE ORAL
Qty: 1 | Refills: 2 | Status: DISCONTINUED | COMMUNITY
Start: 2020-11-04 | End: 2022-05-26

## 2022-05-26 RX ORDER — TERBINAFINE HYDROCHLORIDE 1 G/100G
1 CREAM TOPICAL 3 TIMES DAILY
Qty: 1 | Refills: 3 | Status: DISCONTINUED | COMMUNITY
Start: 2021-01-13 | End: 2022-05-26

## 2022-05-26 NOTE — HISTORY OF PRESENT ILLNESS
[FreeTextEntry1] : 46 year old man with history of uncontrolled DM1, hypothyroidism, history of non-traumatic fracture of right tibia/fibula presents for follow up of DM1. \par \par No interval updates or hospitalizations. Feeling well.\par \par DM type 1:\par Patient was diagnosed with DM1 at age 8. A1c 8.8% in Feb 2022. Currently on Basaglar 19 units qHS and Humalog 4/5/5, through [facility]. He endorses strict compliance, and takes Admelog ~10 minutes before the meal, never during the meal. Uses a freestyle liibre for glucose monitoring. Download not available, but overview of graph trends show less hypoglycemia in the morning but hyperglycemia post-prandial after breakfast and hypoglycemia often in the early evening. He also has significant response to hypoglycemia corrections, and large spikes with meals as above. No hypoglycemic symptoms. Does state when the Milly shows lows, his FS usually shows 80s. Diet is average, however he does have carb rich meals at times, which lead to the spikes. States he's watching his portions though. No diarrhea or bloating symptoms.\par \par Walks every day.\par \par No reported microvascular other than retinopathy, saw optho Oct 2021. No macrovascular complications. \par Follows with podiatrist. No foot ulceration. Last visit August 2021.\par Urine microalbumin / Cr negative Feb 2022. \par \par Hypothyroidism: Takes 150 mcg of LT4 daily. Takes every morning at 4AM on empty stomach. Does not miss doses. Last TFTs wnl in Feb 2022. Denies palpitations, tremors, weight loss.\par \par HLD: takes Zocor 20 mg daily. LDL 88 Feb 2022.\par \par History of right tibial and fibula fracture: s/p surgery with ortho in March 2018. BMD July 2018 normal: L spine -0.9, left femoral neck -0.9, left total hip -1.1. Work up for fracture negative - vitamin D levels normal, calcium normal, TFTs normal; testosterone noted to be mildly low previously with normal LH and FSH, however testosterone was not drawn in the AM. Patient with normal libido and energy levels at this time. Takes a vitamin D supplement daily - takes 1000 units daily. Does not take a calcium supplement. No interval fxs. \par \par Received both doses of Covid vaccine and a booster.\par \par Having rash on left forearm. Not itchy or painful.

## 2022-05-26 NOTE — ASSESSMENT
[Diabetes Foot Care] : diabetes foot care [Long Term Vascular Complications] : long term vascular complications of diabetes [Carbohydrate Consistent Diet] : carbohydrate consistent diet [Importance of Diet and Exercise] : importance of diet and exercise to improve glycemic control, achieve weight loss and improve cardiovascular health [Exercise/Effect on Glucose] : exercise/effect on glucose [Hypoglycemia Management] : hypoglycemia management [Glucagon Use] : glucagon use [Ketone Testing] : ketone testing [Action and use of Insulin] : action and use of short and long-acting insulin [Self Monitoring of Blood Glucose] : self monitoring of blood glucose [Insulin Self-Administration] : insulin self-administration [FreeTextEntry1] : 46 year old man with history of uncontrolled DM1, hypothyroidism, history of non-traumatic fracture of right tibia/fibula presents for follow up of DM1. \par \par 46 year old man with uncontrolled DM1, hypothyroidism, and HLD\par \par 1. DM1, uncontrolled:\par HbA1c 8.8%, goal is 7% or below.\par FreeStyle Milly BG reading reviewed: shows less hypoglycemia in the morning but hyperglycemia post-prandial after breakfast and hypoglycemia often in the early evening. He also has significant response to hypoglycemia corrections, and large spikes with meals as above. No hypoglycemic symptoms.\par -Continue Basaglar 19 units sq qhs and change prandial to 5/5/4 for now.\par - Discussed consistent carb diet\par - UTD with opthalmology and podiatry\par - Urine microalbumin/creatinine not detected Feb 2022.\par - Sent ketone strips. He has glucagon at home- needs Medic ID\par - A1c today\par \par 2. Hypothyroidism: \par - Clinically euthyroid and biochemically euthyroid\par - Continue LT4 150 mcg daily\par - Repeat TFTs next visit\par \par 3. HLD: \par - 2/2022 LDL 88\par - Continue Zocor 20 mg qhs\par \par 4. Vit D supplement s/p right tibia and fibula fracture:\par  patient currently on Vit D3 1000 units daily. \par  Normal BMD July 2018. \par \par 5. Rash\par Likely ringworm. Will try clotrimazole. If does not help, will send to derm. Patient should get PCP also.\par \par RTC in 3 months\par \par Discussed with Dr. Riojas\par \par Lam Mcintyre DO, Endocrinology Fellow

## 2022-05-26 NOTE — END OF VISIT
[] : Fellow [FreeTextEntry3] : 45 yo M with uncontrolled T1DM, hypothyroidism here for f/u. Noted hypoglycemia on roel in evening however pt reports sugars not as low (usually in 80s though). Adjust insulin as above. If persistent intermittent/unpredictable patterns of postprandial hypoglycemia consider screening for celiac disease given autoimmune hx. Can try clotrimazole 1% OTC for rash and refer to derm if needed.

## 2022-05-26 NOTE — PHYSICAL EXAM
[Alert] : alert [Well Nourished] : well nourished [No Acute Distress] : no acute distress [EOMI] : extra ocular movement intact [No Proptosis] : no proptosis [No Lid Lag] : no lid lag [Supple] : the neck was supple [Thyroid Not Enlarged] : the thyroid was not enlarged [No Thyroid Nodules] : no palpable thyroid nodules [No Respiratory Distress] : no respiratory distress [No Accessory Muscle Use] : no accessory muscle use [Normal Rate and Effort] : normal respiratory rate and effort [Normal Rate] : heart rate was normal [Regular Rhythm] : with a regular rhythm [No Edema] : no peripheral edema [Not Tender] : non-tender [Soft] : abdomen soft [No Involuntary Movements] : no involuntary movements were seen [No Joint Swelling] : no joint swelling seen [Right foot was examined, including] : right foot ~C was examined, including visual inspection with sensory and pulse exams [Left foot was examined, including] : left foot ~C was examined, including visual inspection with sensory and pulse exams [Normal] : normal [2+] : 2+ in the dorsalis pedis [Cranial Nerves Intact] : cranial nerves 2-12 were intact [No Motor Deficits] : the motor exam was normal [Oriented x3] : oriented to person, place, and time [Normal Affect] : the affect was normal [Normal Mood] : the mood was normal [Foot Ulcers] : no foot ulcers [#1 Diminished] : number 1 was normal [#2 Diminished] : number 2 was normal [#3 Diminished] : number 3 was normal [#4 Diminished] : number 4 was normal [#5 Diminished] : number 5 was normal [#6 Diminished] : number 6 was normal [de-identified] : Fungal rash left antecubital fossa and right shin.

## 2022-08-19 LAB — GLUCOSE BLDC GLUCOMTR-MCNC: 273

## 2022-09-01 ENCOUNTER — APPOINTMENT (OUTPATIENT)
Dept: ENDOCRINOLOGY | Facility: HOSPITAL | Age: 47
End: 2022-09-01

## 2022-09-01 ENCOUNTER — OUTPATIENT (OUTPATIENT)
Dept: OUTPATIENT SERVICES | Facility: HOSPITAL | Age: 47
LOS: 1 days | End: 2022-09-01
Payer: SELF-PAY

## 2022-09-01 VITALS
HEIGHT: 64.5 IN | SYSTOLIC BLOOD PRESSURE: 120 MMHG | HEART RATE: 93 BPM | BODY MASS INDEX: 32.38 KG/M2 | DIASTOLIC BLOOD PRESSURE: 73 MMHG | RESPIRATION RATE: 14 BRPM | WEIGHT: 192 LBS | TEMPERATURE: 98 F

## 2022-09-01 DIAGNOSIS — E06.9 THYROIDITIS, UNSPECIFIED: ICD-10-CM

## 2022-09-01 LAB
24R-OH-CALCIDIOL SERPL-MCNC: 41.2 NG/ML — SIGNIFICANT CHANGE UP (ref 30–80)
A1C WITH ESTIMATED AVERAGE GLUCOSE RESULT: 8.5 % — HIGH (ref 4–5.6)
ALBUMIN SERPL ELPH-MCNC: 4.4 G/DL — SIGNIFICANT CHANGE UP (ref 3.3–5)
ALP SERPL-CCNC: 82 U/L — SIGNIFICANT CHANGE UP (ref 40–120)
ALT FLD-CCNC: 21 U/L — SIGNIFICANT CHANGE UP (ref 10–45)
ANION GAP SERPL CALC-SCNC: 12 MMOL/L — SIGNIFICANT CHANGE UP (ref 5–17)
AST SERPL-CCNC: 16 U/L — SIGNIFICANT CHANGE UP (ref 10–40)
BILIRUB SERPL-MCNC: 0.4 MG/DL — SIGNIFICANT CHANGE UP (ref 0.2–1.2)
BUN SERPL-MCNC: 17 MG/DL — SIGNIFICANT CHANGE UP (ref 7–23)
CALCIUM SERPL-MCNC: 9.3 MG/DL — SIGNIFICANT CHANGE UP (ref 8.4–10.5)
CHLORIDE SERPL-SCNC: 100 MMOL/L — SIGNIFICANT CHANGE UP (ref 96–108)
CO2 SERPL-SCNC: 27 MMOL/L — SIGNIFICANT CHANGE UP (ref 22–31)
CREAT SERPL-MCNC: 0.73 MG/DL — SIGNIFICANT CHANGE UP (ref 0.5–1.3)
EGFR: 114 ML/MIN/1.73M2 — SIGNIFICANT CHANGE UP
ESTIMATED AVERAGE GLUCOSE: 197 MG/DL — HIGH (ref 68–114)
GLUCOSE SERPL-MCNC: 250 MG/DL — HIGH (ref 70–99)
POTASSIUM SERPL-MCNC: 5.3 MMOL/L — SIGNIFICANT CHANGE UP (ref 3.5–5.3)
POTASSIUM SERPL-SCNC: 5.3 MMOL/L — SIGNIFICANT CHANGE UP (ref 3.5–5.3)
PROT SERPL-MCNC: 7.1 G/DL — SIGNIFICANT CHANGE UP (ref 6–8.3)
SODIUM SERPL-SCNC: 140 MMOL/L — SIGNIFICANT CHANGE UP (ref 135–145)
T4 FREE SERPL-MCNC: 1.5 NG/DL — SIGNIFICANT CHANGE UP (ref 0.9–1.8)
TSH SERPL-MCNC: 1.46 UIU/ML — SIGNIFICANT CHANGE UP (ref 0.27–4.2)
VIT B12 SERPL-MCNC: 1058 PG/ML — SIGNIFICANT CHANGE UP (ref 232–1245)

## 2022-09-01 PROCEDURE — 83036 HEMOGLOBIN GLYCOSYLATED A1C: CPT

## 2022-09-01 PROCEDURE — G0463: CPT

## 2022-09-01 PROCEDURE — 84443 ASSAY THYROID STIM HORMONE: CPT

## 2022-09-01 PROCEDURE — ZZZZZ: CPT

## 2022-09-01 PROCEDURE — 84439 ASSAY OF FREE THYROXINE: CPT

## 2022-09-01 PROCEDURE — 86364 TISS TRNSGLTMNASE EA IG CLAS: CPT

## 2022-09-01 PROCEDURE — 82607 VITAMIN B-12: CPT

## 2022-09-01 PROCEDURE — 36415 COLL VENOUS BLD VENIPUNCTURE: CPT

## 2022-09-01 PROCEDURE — 82306 VITAMIN D 25 HYDROXY: CPT

## 2022-09-01 PROCEDURE — 80053 COMPREHEN METABOLIC PANEL: CPT

## 2022-09-01 PROCEDURE — 83520 IMMUNOASSAY QUANT NOS NONAB: CPT

## 2022-09-01 NOTE — END OF VISIT
[] : Fellow [FreeTextEntry3] : 47 yo M with uncontrolled T1DM on MDI regimen ,using roel, with some noted AM lows however on FSG typically in 90s more often. Also with post lunch/dinner hyperglycemia. Reviewed hypoglycemia treatment and preventing overtreatment, checking FSG if roel reporting lows. Pt injecting insulin premeals appropriately. Adjust insulin as above. Check labs.

## 2022-09-01 NOTE — ASSESSMENT
[Diabetes Foot Care] : diabetes foot care [Long Term Vascular Complications] : long term vascular complications of diabetes [Carbohydrate Consistent Diet] : carbohydrate consistent diet [Importance of Diet and Exercise] : importance of diet and exercise to improve glycemic control, achieve weight loss and improve cardiovascular health [Exercise/Effect on Glucose] : exercise/effect on glucose [Hypoglycemia Management] : hypoglycemia management [Glucagon Use] : glucagon use [Ketone Testing] : ketone testing [Action and use of Insulin] : action and use of short and long-acting insulin [Self Monitoring of Blood Glucose] : self monitoring of blood glucose [Insulin Self-Administration] : insulin self-administration [FreeTextEntry1] : 46 year old man with history of uncontrolled DM1, hypothyroidism, history of non-traumatic fracture of right tibia/fibula presents for follow up of DM1. \par \par 46 year old man with uncontrolled DM1, hypothyroidism, and HLD\par \par 1. DM1, uncontrolled:\par HbA1c 9.2%, goal is 7% or below.\par FreeStyle Milly BG reading reviewed, as above. \par -Continue Basaglar 19 units sq qhs. If fasting BG in the 80s-90s, can reduce to 17 or 18 units.\par -Change prandial to 5/6/5. If eating a large meal, go up to 6/7/6.\par - Discussed consistent carb diet\par - UTD with opthalmology and podiatry\par - Urine microalbumin/creatinine not detected Feb 2022.\par - Sent ketone strips. He has glucagon at home- needs Medic ID\par - A1c today, transglutaminase Ab IgG/IgA and Vit B12\par \par 2. Hypothyroidism: \par - Clinically euthyroid and biochemically euthyroid\par - Continue LT4 150 mcg daily\par - Repeat TFTs today\par \par 3. HLD: \par - 2/2022 LDL 88\par - Continue Zocor 20 mg qhs\par \par 4. Vit D supplement s/p right tibia and fibula fracture:\par  patient currently on Vit D3 1000 units daily. \par  Normal BMD July 2018. \par Check 25 OH Vit D today\par \par RTC in 3 months\par \par Discussed with Dr. Riojas\par \par Lam Mcintyre DO, Endocrinology Fellow

## 2022-09-01 NOTE — PHYSICAL EXAM
[Alert] : alert [Well Nourished] : well nourished [No Acute Distress] : no acute distress [EOMI] : extra ocular movement intact [No Proptosis] : no proptosis [No Lid Lag] : no lid lag [Supple] : the neck was supple [Thyroid Not Enlarged] : the thyroid was not enlarged [No Thyroid Nodules] : no palpable thyroid nodules [No Respiratory Distress] : no respiratory distress [No Accessory Muscle Use] : no accessory muscle use [Normal Rate and Effort] : normal respiratory rate and effort [Normal Rate] : heart rate was normal [Regular Rhythm] : with a regular rhythm [No Edema] : no peripheral edema [Not Tender] : non-tender [Soft] : abdomen soft [No Involuntary Movements] : no involuntary movements were seen [No Joint Swelling] : no joint swelling seen [Cranial Nerves Intact] : cranial nerves 2-12 were intact [No Motor Deficits] : the motor exam was normal [Oriented x3] : oriented to person, place, and time [Normal Affect] : the affect was normal [Normal Mood] : the mood was normal

## 2022-09-01 NOTE — HISTORY OF PRESENT ILLNESS
[FreeTextEntry1] : 46 year old man with history of uncontrolled DM1, hypothyroidism, history of non-traumatic fracture of right tibia/fibula presents for follow up of DM1. \par \par No interval updates or hospitalizations. Feeling well.\par \par DM type 1:\par Patient was diagnosed with DM1 at age 8. A1c 8.8% in Feb 2022, up to 9.2% in June 2022. Currently on Basaglar 19 units qHS and Humalog 4/5/5, through [facility]. He endorses strict compliance, and takes Admelog ~10 minutes before the meal, never during the meal. Uses a freestyle milly for glucose monitoring. Download not available, but overview of graph trends show frequent hypoglycemia but patient states FSBG shows 80s-90s at those times. No symptoms when Milly shows lows. Patient reports fasting AM glucose 120-200s. No fasting hypoglycemia. Is often high at night. No nighttime snacks. Hyperglycemia is often after dinner. Some nights not going as high after dinner. Depends on what he's eating. Often high after lunch. He has a hx of significant response to hypoglycemia corrections, and large spikes with meals as above. No hypoglycemic symptoms. Diet is average, however he does have carb rich meals at times, which lead to the spikes. No diarrhea or bloating symptoms.\par \par Walks every day.\par \par No reported microvascular other than retinopathy, saw optho a couple months ago. No macrovascular complications. \par Follows with podiatrist. No foot ulceration. Last visit a couple months ago.\par Urine microalbumin / Cr negative Feb 2022. \par \par Hypothyroidism: Takes 150 mcg of LT4 daily. Takes every morning at 4AM on empty stomach. Does not miss doses. Last TFTs wnl in Feb 2022. Denies palpitations, tremors, weight loss.\par \par HLD: takes Zocor 20 mg daily. LDL 88 Feb 2022.\par \par History of right tibial and fibula fracture: s/p surgery with ortho in March 2018. BMD July 2018 normal: L spine -0.9, left femoral neck -0.9, left total hip -1.1. Work up for fracture negative - vitamin D levels normal, calcium normal, TFTs normal; testosterone noted to be mildly low previously with normal LH and FSH, however testosterone was not drawn in the AM. Patient with normal libido and energy levels at this time. Takes a vitamin D supplement daily - takes 1000 units daily. Does not take a calcium supplement. No interval fxs. \par \par Received both doses of Covid vaccine and a booster.

## 2022-09-02 DIAGNOSIS — E10.39 TYPE 1 DIABETES MELLITUS WITH OTHER DIABETIC OPHTHALMIC COMPLICATION: ICD-10-CM

## 2022-09-02 DIAGNOSIS — E03.9 HYPOTHYROIDISM, UNSPECIFIED: ICD-10-CM

## 2022-09-02 DIAGNOSIS — E78.5 HYPERLIPIDEMIA, UNSPECIFIED: ICD-10-CM

## 2022-09-03 LAB
TTG IGA SER-ACNC: <1.2 U/ML — SIGNIFICANT CHANGE UP
TTG IGA SER-ACNC: NEGATIVE — SIGNIFICANT CHANGE UP
TTG IGG SER IA-ACNC: NEGATIVE — SIGNIFICANT CHANGE UP
TTG IGG SER-ACNC: <1.2 U/ML — SIGNIFICANT CHANGE UP

## 2022-09-06 ENCOUNTER — NON-APPOINTMENT (OUTPATIENT)
Age: 47
End: 2022-09-06

## 2022-12-01 ENCOUNTER — APPOINTMENT (OUTPATIENT)
Dept: ENDOCRINOLOGY | Facility: HOSPITAL | Age: 47
End: 2022-12-01
Payer: COMMERCIAL

## 2022-12-01 ENCOUNTER — OUTPATIENT (OUTPATIENT)
Dept: OUTPATIENT SERVICES | Facility: HOSPITAL | Age: 47
LOS: 1 days | End: 2022-12-01
Payer: SELF-PAY

## 2022-12-01 VITALS
BODY MASS INDEX: 32.55 KG/M2 | HEART RATE: 88 BPM | WEIGHT: 193 LBS | DIASTOLIC BLOOD PRESSURE: 76 MMHG | SYSTOLIC BLOOD PRESSURE: 118 MMHG | TEMPERATURE: 96.7 F | HEIGHT: 64.5 IN

## 2022-12-01 DIAGNOSIS — E06.9 THYROIDITIS, UNSPECIFIED: ICD-10-CM

## 2022-12-01 DIAGNOSIS — E10.39 TYPE 1 DIABETES MELLITUS WITH OTHER DIABETIC OPHTHALMIC COMPLICATION: ICD-10-CM

## 2022-12-01 LAB
A1C WITH ESTIMATED AVERAGE GLUCOSE RESULT: 9.1 % — HIGH (ref 4–5.6)
BASOPHILS # BLD AUTO: 0.04 K/UL — SIGNIFICANT CHANGE UP (ref 0–0.2)
BASOPHILS NFR BLD AUTO: 0.5 % — SIGNIFICANT CHANGE UP (ref 0–2)
EOSINOPHIL # BLD AUTO: 0.04 K/UL — SIGNIFICANT CHANGE UP (ref 0–0.5)
EOSINOPHIL NFR BLD AUTO: 0.5 % — SIGNIFICANT CHANGE UP (ref 0–6)
ESTIMATED AVERAGE GLUCOSE: 214 MG/DL — HIGH (ref 68–114)
HCT VFR BLD CALC: 41.5 % — SIGNIFICANT CHANGE UP (ref 39–50)
HGB BLD-MCNC: 13.8 G/DL — SIGNIFICANT CHANGE UP (ref 13–17)
IMM GRANULOCYTES NFR BLD AUTO: 0.4 % — SIGNIFICANT CHANGE UP (ref 0–0.9)
LYMPHOCYTES # BLD AUTO: 1.59 K/UL — SIGNIFICANT CHANGE UP (ref 1–3.3)
LYMPHOCYTES # BLD AUTO: 19.4 % — SIGNIFICANT CHANGE UP (ref 13–44)
MCHC RBC-ENTMCNC: 30.2 PG — SIGNIFICANT CHANGE UP (ref 27–34)
MCHC RBC-ENTMCNC: 33.3 GM/DL — SIGNIFICANT CHANGE UP (ref 32–36)
MCV RBC AUTO: 90.8 FL — SIGNIFICANT CHANGE UP (ref 80–100)
MONOCYTES # BLD AUTO: 0.53 K/UL — SIGNIFICANT CHANGE UP (ref 0–0.9)
MONOCYTES NFR BLD AUTO: 6.5 % — SIGNIFICANT CHANGE UP (ref 2–14)
NEUTROPHILS # BLD AUTO: 5.95 K/UL — SIGNIFICANT CHANGE UP (ref 1.8–7.4)
NEUTROPHILS NFR BLD AUTO: 72.7 % — SIGNIFICANT CHANGE UP (ref 43–77)
PLATELET # BLD AUTO: 333 K/UL — SIGNIFICANT CHANGE UP (ref 150–400)
RBC # BLD: 4.57 M/UL — SIGNIFICANT CHANGE UP (ref 4.2–5.8)
RBC # FLD: 12.6 % — SIGNIFICANT CHANGE UP (ref 10.3–14.5)
WBC # BLD: 8.18 K/UL — SIGNIFICANT CHANGE UP (ref 3.8–10.5)
WBC # FLD AUTO: 8.18 K/UL — SIGNIFICANT CHANGE UP (ref 3.8–10.5)

## 2022-12-01 PROCEDURE — 85025 COMPLETE CBC W/AUTO DIFF WBC: CPT

## 2022-12-01 PROCEDURE — 36415 COLL VENOUS BLD VENIPUNCTURE: CPT

## 2022-12-01 PROCEDURE — 83036 HEMOGLOBIN GLYCOSYLATED A1C: CPT

## 2022-12-01 PROCEDURE — ZZZZZ: CPT | Mod: GC

## 2022-12-01 PROCEDURE — G0463: CPT

## 2022-12-08 ENCOUNTER — NON-APPOINTMENT (OUTPATIENT)
Age: 47
End: 2022-12-08

## 2022-12-15 RX ORDER — FLASH GLUCOSE SCANNING READER
EACH MISCELLANEOUS
Qty: 1 | Refills: 1 | Status: ACTIVE | COMMUNITY
Start: 2019-03-21 | End: 1900-01-01

## 2022-12-28 NOTE — HISTORY OF PRESENT ILLNESS
[FreeTextEntry1] : 47 year old man with history of uncontrolled DM1, hypothyroidism, history of non-traumatic fracture of right tibia/fibula presents for follow up of DM1.\par \par No interval updates or hospitalizations. Feeling well. Has made improvements in diet.\par \par Sept 2022 labs reviewed. A1c 8.5, B12 1058 (nml), TSH 1.46, FT4 1.5, 25 Vit D 41.02, CMP unremarkable except for . Tissue transglutaminase Abs negative.\par \par DM type 1:\par Patient was diagnosed with DM1 at age 8. A1c 8.8% in Feb 2022, up to 9.2% in June 2022. Currently on Basaglar 19 units qHS and Humalog 4/5/5, through [facility]. He endorses strict adherence, and takes Admelog ~10 minutes before the meal, never during the meal. Taking an extra unit of admelog in the afternoon to cover for any hyperglycemia between lunch and dinner. Uses a freestyle roel for glucose monitoring. Download not available, but overview of graph trends show frequent hypoglycemia at noon and also an hour or two after dinner. Not feeling symptoms of this. FSBG shows 80s-90s at those times. Having fasting hyperglycemia most days. No fasting hypoglycemia. Is often high at night. No nighttime snacks. Often high after lunch. He has a hx of significant response to hypoglycemia corrections. No hypoglycemic symptoms. Diet is improved now per patient. No diarrhea or bloating symptoms.\par \par Walks most days.\par \par No reported microvascular other than retinopathy, saw ophtho Oct 2021, due to see again. No macrovascular complications. \par Follows with podiatrist. No foot ulceration. Last visit in June.\par Urine microalbumin / Cr negative Feb 2022. \par \par Hypothyroidism: Takes 150 mcg of LT4 daily. Takes every morning at 4AM on empty stomach. Does not miss doses. Last TFTs wnl in Sept 2022. Denies palpitations, tremors, weight loss.\par \par HLD: takes Zocor 20 mg daily. LDL 88 Feb 2022.\par \par History of right tibial and fibula fracture: s/p surgery with ortho in March 2018. BMD July 2018 normal: L spine -0.9, left femoral neck -0.9, left total hip -1.1. Work up for fracture negative - vitamin D levels normal, calcium normal, TFTs normal; testosterone noted to be mildly low previously with normal LH and FSH, however testosterone was not drawn in the AM. Patient with normal libido and energy levels at this time. Takes a vitamin D supplement daily - takes 1000 units daily. Does not take a calcium supplement. No interval fxs or falls.\par \par Received both doses of Covid vaccine and a booster.

## 2022-12-28 NOTE — PHYSICAL EXAM
[Alert] : alert [Well Nourished] : well nourished [No Acute Distress] : no acute distress [EOMI] : extra ocular movement intact [No Proptosis] : no proptosis [No Lid Lag] : no lid lag [Supple] : the neck was supple [Thyroid Not Enlarged] : the thyroid was not enlarged [No Thyroid Nodules] : no palpable thyroid nodules [No Respiratory Distress] : no respiratory distress [No Accessory Muscle Use] : no accessory muscle use [Normal Rate and Effort] : normal respiratory rate and effort [Normal Rate] : heart rate was normal [Regular Rhythm] : with a regular rhythm [No Edema] : no peripheral edema [Not Tender] : non-tender [Soft] : abdomen soft [No Spinal Tenderness] : no spinal tenderness [Spine Straight] : spine straight [No Involuntary Movements] : no involuntary movements were seen [No Joint Swelling] : no joint swelling seen [No Rash] : no rash [No Skin Lesions] : no skin lesions [Cranial Nerves Intact] : cranial nerves 2-12 were intact [No Motor Deficits] : the motor exam was normal [Oriented x3] : oriented to person, place, and time [Normal Affect] : the affect was normal [Normal Mood] : the mood was normal [Kyphosis] : no kyphosis present [Acanthosis Nigricans] : no acanthosis nigricans

## 2022-12-28 NOTE — ASSESSMENT
[Diabetes Foot Care] : diabetes foot care [Long Term Vascular Complications] : long term vascular complications of diabetes [Carbohydrate Consistent Diet] : carbohydrate consistent diet [Importance of Diet and Exercise] : importance of diet and exercise to improve glycemic control, achieve weight loss and improve cardiovascular health [Exercise/Effect on Glucose] : exercise/effect on glucose [Hypoglycemia Management] : hypoglycemia management [Glucagon Use] : glucagon use [Ketone Testing] : ketone testing [Action and use of Insulin] : action and use of short and long-acting insulin [Self Monitoring of Blood Glucose] : self monitoring of blood glucose [Insulin Self-Administration] : insulin self-administration [FreeTextEntry1] : 46 year old man with history of uncontrolled DM1, hypothyroidism, history of non-traumatic fracture of right tibia/fibula presents for follow up of DM1. \par \par 46 year old man with uncontrolled DM1, hypothyroidism, and HLD\par \par 1. DM1, uncontrolled:\par HbA1c 8.5%, goal is 7% or below.\par FreeStyle Milly BG reading reviewed, as above. \par -Increase Basaglar to 20 units sq qhs. If fasting BG in the 80s-90s, can reduce to 18 or 19 units.\par -Change prandial to 3/4/4. If eating a large meal, go up to 4/5/5.\par - Discussed consistent carb diet\par - Needs to see opthalmology\par - Urine microalbumin/creatinine not detected Feb 2022.\par - Sent ketone strips last time. He has glucagon at home- needs Medic ID\par - A1c today\par \par 2. Hypothyroidism: \par - Clinically euthyroid and biochemically euthyroid\par - Continue LT4 150 mcg daily\par \par 3. HLD: \par - 2/2022 LDL 88\par - Continue Zocor 20 mg qhs\par \par 4. Vit D supplement s/p right tibia and fibula fracture:\par  patient currently on Vit D3 1000 units daily.\par  Normal BMD July 2018.\par \par RTC in 3 months\par \par Discussed with Dr. Tsang.\par \par Lam Mcintyre DO, Endocrinology Fellow

## 2023-02-23 ENCOUNTER — APPOINTMENT (OUTPATIENT)
Dept: ENDOCRINOLOGY | Facility: HOSPITAL | Age: 48
End: 2023-02-23
Payer: COMMERCIAL

## 2023-02-23 ENCOUNTER — OUTPATIENT (OUTPATIENT)
Dept: OUTPATIENT SERVICES | Facility: HOSPITAL | Age: 48
LOS: 1 days | End: 2023-02-23
Payer: SELF-PAY

## 2023-02-23 VITALS
DIASTOLIC BLOOD PRESSURE: 82 MMHG | BODY MASS INDEX: 32.21 KG/M2 | HEIGHT: 64.5 IN | SYSTOLIC BLOOD PRESSURE: 126 MMHG | TEMPERATURE: 97 F | RESPIRATION RATE: 14 BRPM | WEIGHT: 191 LBS | HEART RATE: 106 BPM

## 2023-02-23 DIAGNOSIS — E10.39 TYPE 1 DIABETES MELLITUS WITH OTHER DIABETIC OPHTHALMIC COMPLICATION: ICD-10-CM

## 2023-02-23 DIAGNOSIS — E03.9 HYPOTHYROIDISM, UNSPECIFIED: ICD-10-CM

## 2023-02-23 DIAGNOSIS — E78.5 HYPERLIPIDEMIA, UNSPECIFIED: ICD-10-CM

## 2023-02-23 DIAGNOSIS — E11.65 TYPE 2 DIABETES MELLITUS WITH HYPERGLYCEMIA: ICD-10-CM

## 2023-02-23 PROCEDURE — G0463: CPT

## 2023-02-23 PROCEDURE — 99215 OFFICE O/P EST HI 40 MIN: CPT

## 2023-03-01 ENCOUNTER — OUTPATIENT (OUTPATIENT)
Dept: OUTPATIENT SERVICES | Facility: HOSPITAL | Age: 48
LOS: 1 days | End: 2023-03-01
Payer: SELF-PAY

## 2023-03-01 ENCOUNTER — APPOINTMENT (OUTPATIENT)
Dept: PODIATRY | Facility: HOSPITAL | Age: 48
End: 2023-03-01
Payer: COMMERCIAL

## 2023-03-01 VITALS
OXYGEN SATURATION: 97 % | BODY MASS INDEX: 32.21 KG/M2 | TEMPERATURE: 98 F | WEIGHT: 191 LBS | DIASTOLIC BLOOD PRESSURE: 77 MMHG | HEIGHT: 64.5 IN | SYSTOLIC BLOOD PRESSURE: 119 MMHG | HEART RATE: 107 BPM

## 2023-03-01 DIAGNOSIS — L60.9 NAIL DISORDER, UNSPECIFIED: ICD-10-CM

## 2023-03-01 DIAGNOSIS — M79.609 PAIN IN UNSPECIFIED LIMB: ICD-10-CM

## 2023-03-01 DIAGNOSIS — E10.39 TYPE 1 DIABETES MELLITUS WITH OTHER DIABETIC OPHTHALMIC COMPLICATION: ICD-10-CM

## 2023-03-01 PROCEDURE — ZZZZZ: CPT | Mod: GC

## 2023-03-01 PROCEDURE — G0463: CPT

## 2023-03-01 NOTE — ASSESSMENT
[FreeTextEntry1] : 47M presents for diabetic foot eval \par - Patient seen and evaluated \par - L foot with b/l digits elongated nails, no fungal component, no open lesions, no clinical signs of infection \par - Educated patient on proper diabetic foot care \par - Aseptic debridement of b/l elongated nails x 5 with sterile nail nipper \par - Instructed patient to make an appt with his PCP \par - Instructed patient on proper diet and exercise for glucose management\par - RTC 6 months

## 2023-03-01 NOTE — HISTORY OF PRESENT ILLNESS
[FreeTextEntry1] : 47M presents for diabetic foot eval. Pt is type 1 diabetic patient. Pt wanted to get his feet checked since he hasn't been to pod in a long time. pt doesn't experience any pain. Pt denies N/V/F/C/SOB. \par \par Last PCP visit 1 month ago \par A1C: in the 7 (3 months ago)\par FBS: 212 (today)

## 2023-03-03 ENCOUNTER — OUTPATIENT (OUTPATIENT)
Dept: OUTPATIENT SERVICES | Facility: HOSPITAL | Age: 48
LOS: 1 days | End: 2023-03-03
Payer: SELF-PAY

## 2023-03-03 DIAGNOSIS — E11.65 TYPE 2 DIABETES MELLITUS WITH HYPERGLYCEMIA: ICD-10-CM

## 2023-03-03 LAB
A1C WITH ESTIMATED AVERAGE GLUCOSE RESULT: 9.7 % — HIGH (ref 4–5.6)
ALBUMIN, RANDOM URINE: <1.2 MG/DL — SIGNIFICANT CHANGE UP
ALBUMIN/CREATININE RATIO (ACR): SIGNIFICANT CHANGE UP MG/G (ref 0–30)
CHOLEST SERPL-MCNC: 167 MG/DL — SIGNIFICANT CHANGE UP
CREAT ?TM UR-MCNC: 111 MG/DL — SIGNIFICANT CHANGE UP
ESTIMATED AVERAGE GLUCOSE: 232 MG/DL — HIGH (ref 68–114)
FSH SERPL-MCNC: 7.6 IU/L — SIGNIFICANT CHANGE UP (ref 1.5–12.4)
HDLC SERPL-MCNC: 78 MG/DL — SIGNIFICANT CHANGE UP
LH SERPL-ACNC: 4.5 IU/L — SIGNIFICANT CHANGE UP
LIPID PNL WITH DIRECT LDL SERPL: 81 MG/DL — SIGNIFICANT CHANGE UP
NON HDL CHOLESTEROL: 89 MG/DL — SIGNIFICANT CHANGE UP
TRIGL SERPL-MCNC: 44 MG/DL — SIGNIFICANT CHANGE UP

## 2023-03-03 PROCEDURE — 83002 ASSAY OF GONADOTROPIN (LH): CPT

## 2023-03-03 PROCEDURE — 84270 ASSAY OF SEX HORMONE GLOBUL: CPT

## 2023-03-03 PROCEDURE — 80061 LIPID PANEL: CPT

## 2023-03-03 PROCEDURE — 84402 ASSAY OF FREE TESTOSTERONE: CPT

## 2023-03-03 PROCEDURE — 82043 UR ALBUMIN QUANTITATIVE: CPT

## 2023-03-03 PROCEDURE — 83001 ASSAY OF GONADOTROPIN (FSH): CPT

## 2023-03-03 PROCEDURE — 83036 HEMOGLOBIN GLYCOSYLATED A1C: CPT

## 2023-03-03 PROCEDURE — 36415 COLL VENOUS BLD VENIPUNCTURE: CPT

## 2023-03-04 LAB — SHBG SERPL-MCNC: 50.5 NMOL/L — SIGNIFICANT CHANGE UP (ref 16.5–55.9)

## 2023-03-07 LAB
TESTOST FREE SERPL-MCNC: 8.8 PG/ML — SIGNIFICANT CHANGE UP (ref 5.7–30.7)
TESTOST FREE+TOTAL PANEL SERPL-MCNC: 358 NG/DL — SIGNIFICANT CHANGE UP (ref 300–836)

## 2023-03-09 ENCOUNTER — NON-APPOINTMENT (OUTPATIENT)
Age: 48
End: 2023-03-09

## 2023-03-16 ENCOUNTER — NON-APPOINTMENT (OUTPATIENT)
Age: 48
End: 2023-03-16

## 2023-03-16 ENCOUNTER — APPOINTMENT (OUTPATIENT)
Dept: OPHTHALMOLOGY | Facility: CLINIC | Age: 48
End: 2023-03-16
Payer: COMMERCIAL

## 2023-03-16 PROCEDURE — 92133 CPTRZD OPH DX IMG PST SGM ON: CPT

## 2023-03-16 PROCEDURE — 92014 COMPRE OPH EXAM EST PT 1/>: CPT

## 2023-03-17 NOTE — END OF VISIT
[] : Fellow [FreeTextEntry3] : Pt seen and examined by bedside.  \par Increase BG 22 U HS and adjust as educated. \par Changed HL 3/4/5 \par rest of the plan as per fellow's note \par chart reviewed \par > 40 mins spent

## 2023-03-17 NOTE — ASSESSMENT
[Diabetes Foot Care] : diabetes foot care [Long Term Vascular Complications] : long term vascular complications of diabetes [Carbohydrate Consistent Diet] : carbohydrate consistent diet [Importance of Diet and Exercise] : importance of diet and exercise to improve glycemic control, achieve weight loss and improve cardiovascular health [Exercise/Effect on Glucose] : exercise/effect on glucose [Hypoglycemia Management] : hypoglycemia management [Glucagon Use] : glucagon use [Ketone Testing] : ketone testing [Action and use of Insulin] : action and use of short and long-acting insulin [Self Monitoring of Blood Glucose] : self monitoring of blood glucose [Insulin Self-Administration] : insulin self-administration [FreeTextEntry1] : 46 year old man with history of uncontrolled DM1, hypothyroidism, history of non-traumatic fracture of right tibia/fibula presents for follow up of DM1. \par \par 46 year old man with uncontrolled DM1, hypothyroidism, and HLD\par \par 1. DM1, uncontrolled:\par HbA1c 9.1%, goal is 7% or below.\par FreeStyle Milly BG reading reviewed, as above. \par -Increase Basaglar to 22 units sq qhs. Can adjust by a unit or two at night if fasting BG is low or high.\par - Change prandial to 3/5/4. May add pre-meal sliding scale next time.\par - Discussed consistent carb diet\par - Needs to see opthalmology and podiatry\par - Urine microalbumin/creatinine not detected Feb 2022.\par - Sent ketone strips previously. He has glucagon at home- needs Medic ID\par - A1c as well as lipids and urine ACR with next labs\par \par 2. Hypothyroidism: \par - Clinically euthyroid and biochemically euthyroid\par - Continue LT4 150 mcg daily\par \par 3. HLD: \par - 2/2022 LDL 88\par - Continue Zocor 20 mg qhs\par \par 4. Vit D supplement s/p right tibia and fibula fracture:\par  patient currently on Vit D3 1000 units daily.\par  Normal BMD July 2018.\par Reporting depression and forgetfulness. Will get fasting labs this upcoming Tues to check testo levels.\par \par RTC in 3 months\par \par Discussed with Dr. Tsang.\par \par Lam Mcintyre DO, Endocrinology Fellow

## 2023-03-17 NOTE — PHYSICAL EXAM
[Alert] : alert [Well Nourished] : well nourished [No Acute Distress] : no acute distress [EOMI] : extra ocular movement intact [No Proptosis] : no proptosis [No Lid Lag] : no lid lag [No LAD] : no lymphadenopathy [Supple] : the neck was supple [No Thyroid Nodules] : no palpable thyroid nodules [No Respiratory Distress] : no respiratory distress [No Accessory Muscle Use] : no accessory muscle use [Normal Rate and Effort] : normal respiratory rate and effort [Regular Rhythm] : with a regular rhythm [No Edema] : no peripheral edema [Not Tender] : non-tender [Soft] : abdomen soft [No Involuntary Movements] : no involuntary movements were seen [No Joint Swelling] : no joint swelling seen [Cranial Nerves Intact] : cranial nerves 2-12 were intact [No Motor Deficits] : the motor exam was normal [Oriented x3] : oriented to person, place, and time [Normal Affect] : the affect was normal [Normal Mood] : the mood was normal [de-identified] : Tachycardic.

## 2023-03-17 NOTE — HISTORY OF PRESENT ILLNESS
[FreeTextEntry1] : 47 year old man with history of uncontrolled DM1, hypothyroidism, history of non-traumatic fracture of right tibia/fibula presents for follow up of DM1.\par \par No interval updates or hospitalizations. Feeling well. Has made improvements in diet.\par \par DM type 1:\par Patient was diagnosed with DM1 at age 8. A1c 8.8% in Feb 2022, up to 9.2% in June 2022, 9.1% in Dec 2022. Currently on Basaglar 20 units qHS and Humalog 4/5/5, through [facility]. He endorses strict adherence, and takes Admelog ~10 minutes before the meal, never during the meal. Taking an extra unit of admelog in the afternoon to cover for any hyperglycemia between lunch and dinner. Uses a freestyle roel for glucose monitoring. Download not available, but overview of graph trends show occasional hypoglycemia at varying times. Not feeling symptoms of this. FSBG shows 80s-90s at those times. Having fasting hyperglycemia most days. No fasting hypoglycemia. Is often high at night. No nighttime snacks. Often high after lunch. He has a hx of significant response to hypoglycemia corrections. No hypoglycemic symptoms. Diet is improved now per patient. No diarrhea or bloating symptoms. Is having nausea, unsure why. Wants to see GI.\par \par Walks most days.\par \par No reported microvascular other than retinopathy, saw ophtho Oct 2021, due to see again. No macrovascular complications. \par Follows with podiatrist. No foot ulceration. Last visit in June 2021. Wants to see again.\par Urine microalbumin / Cr negative Feb 2022. \par \par Hypothyroidism: Takes 150 mcg of LT4 daily. Takes every morning at 4AM on empty stomach. Does not miss doses. Last TFTs wnl in Sept 2022. Denies palpitations, tremors, weight loss.\par \par HLD: takes Zocor 20 mg daily. LDL 88 Feb 2022.\par \par History of right tibial and fibula fracture: s/p surgery with ortho in March 2018. BMD July 2018 normal: L spine -0.9, left femoral neck -0.9, left total hip -1.1. Work up for fracture negative - vitamin D levels normal, calcium normal, TFTs normal; testosterone noted to be mildly low previously with normal LH and FSH, however testosterone was not drawn in the AM. Patient with normal libido and energy levels at this time. Takes a vitamin D supplement daily - takes 1000 units daily. 25 D normal in Sept 2022. Does not take a calcium supplement. No interval fxs or falls. \par \par Received both doses of Covid vaccine and a booster.

## 2023-05-18 ENCOUNTER — APPOINTMENT (OUTPATIENT)
Dept: ENDOCRINOLOGY | Facility: HOSPITAL | Age: 48
End: 2023-05-18
Payer: COMMERCIAL

## 2023-05-18 ENCOUNTER — OUTPATIENT (OUTPATIENT)
Dept: OUTPATIENT SERVICES | Facility: HOSPITAL | Age: 48
LOS: 1 days | End: 2023-05-18
Payer: SELF-PAY

## 2023-05-18 VITALS
SYSTOLIC BLOOD PRESSURE: 108 MMHG | RESPIRATION RATE: 14 BRPM | DIASTOLIC BLOOD PRESSURE: 73 MMHG | OXYGEN SATURATION: 98 % | WEIGHT: 194 LBS | TEMPERATURE: 97.9 F | HEART RATE: 96 BPM | BODY MASS INDEX: 32.32 KG/M2 | HEIGHT: 65 IN

## 2023-05-18 DIAGNOSIS — E03.9 HYPOTHYROIDISM, UNSPECIFIED: ICD-10-CM

## 2023-05-18 DIAGNOSIS — E06.9 THYROIDITIS, UNSPECIFIED: ICD-10-CM

## 2023-05-18 DIAGNOSIS — E10.39 TYPE 1 DIABETES MELLITUS WITH OTHER DIABETIC OPHTHALMIC COMPLICATION: ICD-10-CM

## 2023-05-18 DIAGNOSIS — E78.5 HYPERLIPIDEMIA, UNSPECIFIED: ICD-10-CM

## 2023-05-18 LAB
A1C WITH ESTIMATED AVERAGE GLUCOSE RESULT: 10.2 % — HIGH (ref 4–5.6)
ALBUMIN SERPL ELPH-MCNC: 4.3 G/DL — SIGNIFICANT CHANGE UP (ref 3.3–5)
ALP SERPL-CCNC: 91 U/L — SIGNIFICANT CHANGE UP (ref 40–120)
ALT FLD-CCNC: 24 U/L — SIGNIFICANT CHANGE UP (ref 10–45)
ANION GAP SERPL CALC-SCNC: 15 MMOL/L — SIGNIFICANT CHANGE UP (ref 5–17)
AST SERPL-CCNC: 17 U/L — SIGNIFICANT CHANGE UP (ref 10–40)
BILIRUB SERPL-MCNC: 0.6 MG/DL — SIGNIFICANT CHANGE UP (ref 0.2–1.2)
BUN SERPL-MCNC: 22 MG/DL — SIGNIFICANT CHANGE UP (ref 7–23)
CALCIUM SERPL-MCNC: 9 MG/DL — SIGNIFICANT CHANGE UP (ref 8.4–10.5)
CHLORIDE SERPL-SCNC: 100 MMOL/L — SIGNIFICANT CHANGE UP (ref 96–108)
CO2 SERPL-SCNC: 25 MMOL/L — SIGNIFICANT CHANGE UP (ref 22–31)
CREAT SERPL-MCNC: 0.67 MG/DL — SIGNIFICANT CHANGE UP (ref 0.5–1.3)
EGFR: 117 ML/MIN/1.73M2 — SIGNIFICANT CHANGE UP
ESTIMATED AVERAGE GLUCOSE: 246 MG/DL — HIGH (ref 68–114)
GLUCOSE SERPL-MCNC: 334 MG/DL — HIGH (ref 70–99)
POTASSIUM SERPL-MCNC: 4.7 MMOL/L — SIGNIFICANT CHANGE UP (ref 3.5–5.3)
POTASSIUM SERPL-SCNC: 4.7 MMOL/L — SIGNIFICANT CHANGE UP (ref 3.5–5.3)
PROT SERPL-MCNC: 7.4 G/DL — SIGNIFICANT CHANGE UP (ref 6–8.3)
SODIUM SERPL-SCNC: 140 MMOL/L — SIGNIFICANT CHANGE UP (ref 135–145)
T4 FREE SERPL-MCNC: 1.3 NG/DL — SIGNIFICANT CHANGE UP (ref 0.9–1.8)
TSH SERPL-MCNC: 3.26 UIU/ML — SIGNIFICANT CHANGE UP (ref 0.27–4.2)

## 2023-05-18 PROCEDURE — G0463: CPT

## 2023-05-18 PROCEDURE — ZZZZZ: CPT

## 2023-05-18 PROCEDURE — 80053 COMPREHEN METABOLIC PANEL: CPT

## 2023-05-18 PROCEDURE — 83036 HEMOGLOBIN GLYCOSYLATED A1C: CPT

## 2023-05-18 PROCEDURE — 84443 ASSAY THYROID STIM HORMONE: CPT

## 2023-05-18 PROCEDURE — 84439 ASSAY OF FREE THYROXINE: CPT

## 2023-05-18 NOTE — END OF VISIT
[] : Fellow [FreeTextEntry3] : 48 yo M with uncontrolled DM1, adjust insulin doses as above and try metformin for insulin resistance/high BMI. Discussed need to double check FSG if roel alerts to a low sugar as they may not be real. Will look into cost options for CGM as pt reports roel sensors are too expensive.

## 2023-05-18 NOTE — ASSESSMENT
[Diabetes Foot Care] : diabetes foot care [Long Term Vascular Complications] : long term vascular complications of diabetes [Carbohydrate Consistent Diet] : carbohydrate consistent diet [Importance of Diet and Exercise] : importance of diet and exercise to improve glycemic control, achieve weight loss and improve cardiovascular health [Exercise/Effect on Glucose] : exercise/effect on glucose [Hypoglycemia Management] : hypoglycemia management [Glucagon Use] : glucagon use [Ketone Testing] : ketone testing [Action and use of Insulin] : action and use of short and long-acting insulin [Self Monitoring of Blood Glucose] : self monitoring of blood glucose [Insulin Self-Administration] : insulin self-administration [FreeTextEntry1] : 47 year old man with history of uncontrolled DM1, hypothyroidism, history of non-traumatic fracture of right tibia/fibula presents for follow up of DM1. \par \par 47 year old man with uncontrolled DM1, hypothyroidism, and HLD\par \par 1. DM1, uncontrolled:\par HbA1c 9.7%, goal is 7% or below.\par FreeStyle Milly BG reading reviewed, as above. \par - Decrease Basaglar to 17 units sq qhs.\par - Add metformin 500mg BID\par - Continue prandial 3/5/4.\par - Check FSBG when milly shows lows as we suspect the lows are not real\par - Discussed consistent carb diet. Will refer to nutritionist. Carb counting would be ideal.\par - UTD with opthalmology and podiatry\par - Urine microalbumin/creatinine not detected March 2023.\par - Sent ketone strips previously. He has glucagon at home- needs Medic ID\par - A1c, CMP today\par \par 2. Hypothyroidism: \par - Clinically euthyroid and biochemically euthyroid\par - Continue LT4 150 mcg daily\par - Check TFTs today\par \par 3. HLD: \par - March 2023 lipids reviewed\par - Continue Zocor 20 mg qhs\par \par 4. Vit D supplement s/p right tibia and fibula fracture:\par  patient currently on Vit D3 1000 units daily.\par  Normal BMD July 2018.\par \par RTC in 3 months\par \par Discussed with Dr. Riojas.\par \par Lam Mcintyre DO, Endocrinology Fellow

## 2023-05-18 NOTE — HISTORY OF PRESENT ILLNESS
[FreeTextEntry1] : 47 year old man with history of uncontrolled DM1, hypothyroidism, history of non-traumatic fracture of right tibia/fibula presents for follow up of DM1.\par \par No interval updates or hospitalizations. Feeling well. Has made improvements in diet.\par \par DM type 1:\par Patient was diagnosed with DM1 at age 8. A1c 8.8% in Feb 2022, up to 9.2% in June 2022, 9.1% in Dec 2022,9.7% March 2023. Currently on Basaglar 20 units qHS and Humalog 3/5/4, through [facility]. He endorses strict adherence, and takes Humalog ~15 minutes before the meal, never during the meal. Taking an extra unit of admelog in the afternoon to cover for any hyperglycemia close to dinner time. Uses a freestyle roel for glucose monitoring. Download not available, but overview of graph trends show occasional hypoglycemia at varying times. Not feeling symptoms of this. Hypoglycemia not related to low PO intake as patient eating regularly. Not checking FSBG when roel shows lows. Having fasting hyperglycemia most days. No fasting hypoglycemia. Is often high at night. No nighttime snacks. Often high after lunch. He has a hx of significant response to hypoglycemia corrections. No hypoglycemic symptoms. No diarrhea or bloating symptoms. No nausea. \par \par Breakfast: coffee with bread x2 slices\par Lunch: Turkey sandwich on bread\par Dinner: Pasta (not portioned) and meatballs and sugar free juice\par No snacks.\par \par Walks most days.\par \par No reported microvascular other than retinopathy, saw ophtho March 2023. No macrovascular complications. \par Follows with podiatrist. No foot ulceration. Last visit in March 2023.\par Urine microalbumin / Cr negative Mar 2023.\par \par Hypothyroidism: Takes 150 mcg of LT4 daily. Takes every morning at 4AM on empty stomach. Does not miss doses. Last TFTs wnl in Sept 2022. Denies palpitations, tremors, weight loss.\par \par HLD: takes Zocor 20 mg daily. Lipids reviewed March 2023.\par \par History of right tibial and fibula fracture: s/p surgery with ortho in March 2018. BMD July 2018 normal: L spine -0.9, left femoral neck -0.9, left total hip -1.1. Work up for fracture negative - vitamin D levels normal, calcium normal, TFTs normal; testosterone noted to be mildly low previously with normal LH and FSH, however testosterone was not drawn in the AM. Patient with normal libido and energy levels at this time. Takes a vitamin D supplement daily - takes 1000 units daily. 25 D normal in Sept 2022. Does not take a calcium supplement. No interval fxs or falls. \par \par Received both doses of Covid vaccine and a booster.

## 2023-05-18 NOTE — PHYSICAL EXAM
[Alert] : alert [Well Nourished] : well nourished [No Acute Distress] : no acute distress [EOMI] : extra ocular movement intact [No Proptosis] : no proptosis [No Lid Lag] : no lid lag [Supple] : the neck was supple [Thyroid Not Enlarged] : the thyroid was not enlarged [No Thyroid Nodules] : no palpable thyroid nodules [No Respiratory Distress] : no respiratory distress [No Accessory Muscle Use] : no accessory muscle use [Normal Rate and Effort] : normal respiratory rate and effort [Regular Rhythm] : with a regular rhythm [No Edema] : no peripheral edema [Not Tender] : non-tender [Soft] : abdomen soft [No Spinal Tenderness] : no spinal tenderness [Spine Straight] : spine straight [No Involuntary Movements] : no involuntary movements were seen [No Rash] : no rash [No Joint Swelling] : no joint swelling seen [Cranial Nerves Intact] : cranial nerves 2-12 were intact [No Motor Deficits] : the motor exam was normal [Oriented x3] : oriented to person, place, and time [Normal Affect] : the affect was normal [Normal Mood] : the mood was normal [Kyphosis] : no kyphosis present [de-identified] : Borderline tachycardic.

## 2023-05-24 ENCOUNTER — NON-APPOINTMENT (OUTPATIENT)
Age: 48
End: 2023-05-24

## 2023-09-07 ENCOUNTER — APPOINTMENT (OUTPATIENT)
Dept: ENDOCRINOLOGY | Facility: HOSPITAL | Age: 48
End: 2023-09-07

## 2023-09-14 ENCOUNTER — APPOINTMENT (OUTPATIENT)
Dept: OPHTHALMOLOGY | Facility: CLINIC | Age: 48
End: 2023-09-14
Payer: COMMERCIAL

## 2023-09-14 ENCOUNTER — NON-APPOINTMENT (OUTPATIENT)
Age: 48
End: 2023-09-14

## 2023-09-14 PROCEDURE — 92012 INTRM OPH EXAM EST PATIENT: CPT

## 2023-10-09 ENCOUNTER — APPOINTMENT (OUTPATIENT)
Dept: INTERNAL MEDICINE | Facility: CLINIC | Age: 48
End: 2023-10-09
Payer: SELF-PAY

## 2023-10-09 ENCOUNTER — OUTPATIENT (OUTPATIENT)
Dept: OUTPATIENT SERVICES | Facility: HOSPITAL | Age: 48
LOS: 1 days | End: 2023-10-09
Payer: SELF-PAY

## 2023-10-09 VITALS
SYSTOLIC BLOOD PRESSURE: 120 MMHG | DIASTOLIC BLOOD PRESSURE: 70 MMHG | WEIGHT: 179 LBS | BODY MASS INDEX: 29.82 KG/M2 | OXYGEN SATURATION: 98 % | HEIGHT: 65 IN | HEART RATE: 96 BPM

## 2023-10-09 DIAGNOSIS — N39.44 NOCTURNAL ENURESIS: ICD-10-CM

## 2023-10-09 DIAGNOSIS — E10.9 TYPE 1 DIABETES MELLITUS WITHOUT COMPLICATIONS: ICD-10-CM

## 2023-10-09 DIAGNOSIS — E10.39 TYPE 1 DIABETES MELLITUS WITH OTHER DIABETIC OPHTHALMIC COMPLICATION: ICD-10-CM

## 2023-10-09 DIAGNOSIS — I10 ESSENTIAL (PRIMARY) HYPERTENSION: ICD-10-CM

## 2023-10-09 DIAGNOSIS — Z00.00 ENCOUNTER FOR GENERAL ADULT MEDICAL EXAMINATION WITHOUT ABNORMAL FINDINGS: ICD-10-CM

## 2023-10-09 DIAGNOSIS — R35.1 NOCTURIA: ICD-10-CM

## 2023-10-09 DIAGNOSIS — Z00.00 ENCOUNTER FOR GENERAL ADULT MEDICAL EXAMINATION W/OUT ABNORMAL FINDINGS: ICD-10-CM

## 2023-10-09 LAB — HBA1C MFR BLD HPLC: 8.9

## 2023-10-09 PROCEDURE — 99214 OFFICE O/P EST MOD 30 MIN: CPT | Mod: GE,25

## 2023-10-09 PROCEDURE — G0463: CPT

## 2023-10-10 ENCOUNTER — APPOINTMENT (OUTPATIENT)
Dept: GASTROENTEROLOGY | Facility: HOSPITAL | Age: 48
End: 2023-10-10

## 2023-11-30 ENCOUNTER — APPOINTMENT (OUTPATIENT)
Dept: ENDOCRINOLOGY | Facility: HOSPITAL | Age: 48
End: 2023-11-30
Payer: COMMERCIAL

## 2023-11-30 ENCOUNTER — OUTPATIENT (OUTPATIENT)
Dept: OUTPATIENT SERVICES | Facility: HOSPITAL | Age: 48
LOS: 1 days | End: 2023-11-30
Payer: SELF-PAY

## 2023-11-30 VITALS
SYSTOLIC BLOOD PRESSURE: 135 MMHG | DIASTOLIC BLOOD PRESSURE: 83 MMHG | HEART RATE: 89 BPM | HEIGHT: 65 IN | BODY MASS INDEX: 28.99 KG/M2 | RESPIRATION RATE: 14 BRPM | TEMPERATURE: 97 F | WEIGHT: 174 LBS

## 2023-11-30 DIAGNOSIS — E10.9 TYPE 1 DIABETES MELLITUS W/OUT COMPLICATIONS: ICD-10-CM

## 2023-11-30 DIAGNOSIS — E06.9 THYROIDITIS, UNSPECIFIED: ICD-10-CM

## 2023-11-30 LAB
ALBUMIN SERPL ELPH-MCNC: 4.1 G/DL — SIGNIFICANT CHANGE UP (ref 3.3–5)
ALBUMIN SERPL ELPH-MCNC: 4.1 G/DL — SIGNIFICANT CHANGE UP (ref 3.3–5)
ALP SERPL-CCNC: 104 U/L — SIGNIFICANT CHANGE UP (ref 40–120)
ALP SERPL-CCNC: 104 U/L — SIGNIFICANT CHANGE UP (ref 40–120)
ALT FLD-CCNC: 23 U/L — SIGNIFICANT CHANGE UP (ref 10–45)
ALT FLD-CCNC: 23 U/L — SIGNIFICANT CHANGE UP (ref 10–45)
ANION GAP SERPL CALC-SCNC: 12 MMOL/L — SIGNIFICANT CHANGE UP (ref 5–17)
ANION GAP SERPL CALC-SCNC: 12 MMOL/L — SIGNIFICANT CHANGE UP (ref 5–17)
AST SERPL-CCNC: 15 U/L — SIGNIFICANT CHANGE UP (ref 10–40)
AST SERPL-CCNC: 15 U/L — SIGNIFICANT CHANGE UP (ref 10–40)
BILIRUB SERPL-MCNC: 0.5 MG/DL — SIGNIFICANT CHANGE UP (ref 0.2–1.2)
BILIRUB SERPL-MCNC: 0.5 MG/DL — SIGNIFICANT CHANGE UP (ref 0.2–1.2)
BUN SERPL-MCNC: 19 MG/DL — SIGNIFICANT CHANGE UP (ref 7–23)
BUN SERPL-MCNC: 19 MG/DL — SIGNIFICANT CHANGE UP (ref 7–23)
CALCIUM SERPL-MCNC: 9.4 MG/DL — SIGNIFICANT CHANGE UP (ref 8.4–10.5)
CALCIUM SERPL-MCNC: 9.4 MG/DL — SIGNIFICANT CHANGE UP (ref 8.4–10.5)
CHLORIDE SERPL-SCNC: 99 MMOL/L — SIGNIFICANT CHANGE UP (ref 96–108)
CHLORIDE SERPL-SCNC: 99 MMOL/L — SIGNIFICANT CHANGE UP (ref 96–108)
CO2 SERPL-SCNC: 26 MMOL/L — SIGNIFICANT CHANGE UP (ref 22–31)
CO2 SERPL-SCNC: 26 MMOL/L — SIGNIFICANT CHANGE UP (ref 22–31)
CREAT SERPL-MCNC: 0.61 MG/DL — SIGNIFICANT CHANGE UP (ref 0.5–1.3)
CREAT SERPL-MCNC: 0.61 MG/DL — SIGNIFICANT CHANGE UP (ref 0.5–1.3)
EGFR: 119 ML/MIN/1.73M2 — SIGNIFICANT CHANGE UP
EGFR: 119 ML/MIN/1.73M2 — SIGNIFICANT CHANGE UP
GLUCOSE SERPL-MCNC: 209 MG/DL — HIGH (ref 70–99)
GLUCOSE SERPL-MCNC: 209 MG/DL — HIGH (ref 70–99)
POTASSIUM SERPL-MCNC: 4.5 MMOL/L — SIGNIFICANT CHANGE UP (ref 3.5–5.3)
POTASSIUM SERPL-MCNC: 4.5 MMOL/L — SIGNIFICANT CHANGE UP (ref 3.5–5.3)
POTASSIUM SERPL-SCNC: 4.5 MMOL/L — SIGNIFICANT CHANGE UP (ref 3.5–5.3)
POTASSIUM SERPL-SCNC: 4.5 MMOL/L — SIGNIFICANT CHANGE UP (ref 3.5–5.3)
PROT SERPL-MCNC: 7.2 G/DL — SIGNIFICANT CHANGE UP (ref 6–8.3)
PROT SERPL-MCNC: 7.2 G/DL — SIGNIFICANT CHANGE UP (ref 6–8.3)
SODIUM SERPL-SCNC: 138 MMOL/L — SIGNIFICANT CHANGE UP (ref 135–145)
SODIUM SERPL-SCNC: 138 MMOL/L — SIGNIFICANT CHANGE UP (ref 135–145)
T4 FREE SERPL-MCNC: 1.4 NG/DL — SIGNIFICANT CHANGE UP (ref 0.9–1.8)
T4 FREE SERPL-MCNC: 1.4 NG/DL — SIGNIFICANT CHANGE UP (ref 0.9–1.8)
TSH SERPL-MCNC: 4.9 UIU/ML — HIGH (ref 0.27–4.2)
TSH SERPL-MCNC: 4.9 UIU/ML — HIGH (ref 0.27–4.2)

## 2023-11-30 PROCEDURE — 80053 COMPREHEN METABOLIC PANEL: CPT

## 2023-11-30 PROCEDURE — G0463: CPT

## 2023-11-30 PROCEDURE — 84439 ASSAY OF FREE THYROXINE: CPT

## 2023-11-30 PROCEDURE — 84443 ASSAY THYROID STIM HORMONE: CPT

## 2023-11-30 PROCEDURE — ZZZZZ: CPT | Mod: GC

## 2023-11-30 RX ORDER — PEN NEEDLE, DIABETIC 29 G X1/2"
32G X 4 MM NEEDLE, DISPOSABLE MISCELLANEOUS
Qty: 1 | Refills: 3 | Status: ACTIVE | COMMUNITY
Start: 2021-04-01 | End: 1900-01-01

## 2023-11-30 RX ORDER — SIMVASTATIN 20 MG/1
20 TABLET, FILM COATED ORAL DAILY
Qty: 90 | Refills: 3 | Status: COMPLETED | COMMUNITY
Start: 2021-09-13 | End: 2023-11-30

## 2023-12-04 DIAGNOSIS — E10.9 TYPE 1 DIABETES MELLITUS WITHOUT COMPLICATIONS: ICD-10-CM

## 2023-12-04 DIAGNOSIS — E03.9 HYPOTHYROIDISM, UNSPECIFIED: ICD-10-CM

## 2023-12-04 DIAGNOSIS — E16.2 HYPOGLYCEMIA, UNSPECIFIED: ICD-10-CM

## 2023-12-04 DIAGNOSIS — E78.5 HYPERLIPIDEMIA, UNSPECIFIED: ICD-10-CM

## 2023-12-07 ENCOUNTER — NON-APPOINTMENT (OUTPATIENT)
Age: 48
End: 2023-12-07

## 2023-12-19 ENCOUNTER — RESULT REVIEW (OUTPATIENT)
Age: 48
End: 2023-12-19

## 2023-12-19 ENCOUNTER — OUTPATIENT (OUTPATIENT)
Dept: OUTPATIENT SERVICES | Facility: HOSPITAL | Age: 48
LOS: 1 days | End: 2023-12-19
Payer: SELF-PAY

## 2023-12-19 ENCOUNTER — APPOINTMENT (OUTPATIENT)
Dept: INTERNAL MEDICINE | Facility: CLINIC | Age: 48
End: 2023-12-19
Payer: COMMERCIAL

## 2023-12-19 VITALS
BODY MASS INDEX: 29.66 KG/M2 | DIASTOLIC BLOOD PRESSURE: 70 MMHG | OXYGEN SATURATION: 98 % | WEIGHT: 178 LBS | HEART RATE: 87 BPM | HEIGHT: 65 IN | SYSTOLIC BLOOD PRESSURE: 144 MMHG

## 2023-12-19 DIAGNOSIS — I10 ESSENTIAL (PRIMARY) HYPERTENSION: ICD-10-CM

## 2023-12-19 PROCEDURE — 82607 VITAMIN B-12: CPT

## 2023-12-19 PROCEDURE — 86780 TREPONEMA PALLIDUM: CPT

## 2023-12-19 PROCEDURE — 80053 COMPREHEN METABOLIC PANEL: CPT

## 2023-12-19 PROCEDURE — 99214 OFFICE O/P EST MOD 30 MIN: CPT | Mod: GC

## 2023-12-19 PROCEDURE — G0463: CPT

## 2023-12-19 NOTE — ASSESSMENT
[FreeTextEntry1] : Mr. Sepulveda is a 47 yo M with pmh of DM1, hypothyroidism, and enuresis presenting for follow up with new complaints of memory loss and depression. The patient expresses two months of depressed mood concerning for MDD, not currently in an acute crisis. Will start patient on sertraline 25mg and refer him to [facility] to initiate therapy. Crisis number provided to mother and patient for [facility] in case the patient were to experience an acute crisis. As for the patient's subacute memory loss, unclear etiology at this time. Certainly could be related to his mood symptoms but given the onset, rapid progression, and severity, will check labs and believe patient could benefit from MRI of the head. Will have patient follow up with me next month to see how he is doing.    #Depression - PHQ-9 of 21 - Not currently in acute crisis - Start sertraline 25mg daily - Task [facility] to schedule patient for therapy - Crisis line to [facility] provided - RTC 5 weeks for follow up   #Subacute memory loss - Check folate, B12, RPR, and CMP - Order MRI head - Patient may need valium prior to scan due to claustrophobia, okay to prescribe once scan is confirmed/scheduled   John Quinn, PGY-1

## 2023-12-19 NOTE — HISTORY OF PRESENT ILLNESS
[FreeTextEntry1] : Follow up  [de-identified] : Mr. Sepulveda is a 49 yo M with pmh of DM1, hypothyroidism, and enuresis presenting for follow up with new complaints of memory loss and depression. Patient last seen by Dr. Abraham in October. Patient accompanied by mother, [place]pc[place]. Patient says his memory has been poor for approximately 6-8 months now. He finds himself forgetting things daily including where his phone is or when he was last at this office. He denies getting lost in a familiar place. His mother said it is getting worse which is why they decided to come back to our office today.   He also says that he is feeling depressed. Not currently enjoying things he usually does, feels sad all the time, has trouble focusing, finding little patricio day to day. No trouble sleeping. Unclear if this is related to memory loss. Mother says he actively avoids leaving the house, seeing friends or family. He feels he is very stressed out by family including his divorce and daughter that does not speak with him. Mother says he often will get agitated with her. Patient says he has had thoughts of killing or harming himself but at this time has no plan to act on said thoughts. Patient and mother deny any weapons including guns in the home. Denies substance use. PHQ-9 today is 21.

## 2023-12-19 NOTE — PHYSICAL EXAM
[No Acute Distress] : no acute distress [Well Nourished] : well nourished [Well Developed] : well developed [Well-Appearing] : well-appearing [Normal Sclera/Conjunctiva] : normal sclera/conjunctiva [Normal Outer Ear/Nose] : the outer ears and nose were normal in appearance [Normal Oropharynx] : the oropharynx was normal [No JVD] : no jugular venous distention [Supple] : supple [No Respiratory Distress] : no respiratory distress  [No Accessory Muscle Use] : no accessory muscle use [Clear to Auscultation] : lungs were clear to auscultation bilaterally [Normal Rate] : normal rate  [Normal S1, S2] : normal S1 and S2 [No Edema] : there was no peripheral edema [Non Tender] : non-tender [Non-distended] : non-distended [No Rash] : no rash [Coordination Grossly Intact] : coordination grossly intact [No Focal Deficits] : no focal deficits [Normal Gait] : normal gait [de-identified] : Depressed affect, alert and orientedx3

## 2023-12-19 NOTE — END OF VISIT
[FreeTextEntry3] : In addition to above, we add that no focal deficits noted although has a bilateral tremor with finger-to-nose and seems slow to respond to questions. Lisp noted. Agree with close follow-up given passive SI. [] : Resident

## 2023-12-20 LAB
ALBUMIN SERPL ELPH-MCNC: 4.3 G/DL
ALP BLD-CCNC: 96 U/L
ALT SERPL-CCNC: 24 U/L
ANION GAP SERPL CALC-SCNC: 12 MMOL/L
AST SERPL-CCNC: 20 U/L
BILIRUB SERPL-MCNC: 0.3 MG/DL
BUN SERPL-MCNC: 16 MG/DL
CALCIUM SERPL-MCNC: 8.9 MG/DL
CHLORIDE SERPL-SCNC: 99 MMOL/L
CO2 SERPL-SCNC: 28 MMOL/L
CREAT SERPL-MCNC: 0.6 MG/DL
EGFR: 120 ML/MIN/1.73M2
FOLATE SERPL-MCNC: >20 NG/ML
GLUCOSE SERPL-MCNC: 104 MG/DL
POTASSIUM SERPL-SCNC: 4.1 MMOL/L
PROT SERPL-MCNC: 7 G/DL
SODIUM SERPL-SCNC: 140 MMOL/L
T PALLIDUM AB SER QL IA: NEGATIVE
VIT B12 SERPL-MCNC: 622 PG/ML

## 2023-12-27 ENCOUNTER — OUTPATIENT (OUTPATIENT)
Dept: OUTPATIENT SERVICES | Facility: HOSPITAL | Age: 48
LOS: 1 days | End: 2023-12-27
Payer: SELF-PAY

## 2023-12-27 ENCOUNTER — APPOINTMENT (OUTPATIENT)
Dept: INTERNAL MEDICINE | Facility: CLINIC | Age: 48
End: 2023-12-27
Payer: COMMERCIAL

## 2023-12-27 DIAGNOSIS — I10 ESSENTIAL (PRIMARY) HYPERTENSION: ICD-10-CM

## 2023-12-27 PROCEDURE — 90791 PSYCH DIAGNOSTIC EVALUATION: CPT

## 2024-01-02 ENCOUNTER — NON-APPOINTMENT (OUTPATIENT)
Age: 49
End: 2024-01-02

## 2024-01-02 DIAGNOSIS — R41.3 OTHER AMNESIA: ICD-10-CM

## 2024-01-02 DIAGNOSIS — F32.A DEPRESSION, UNSPECIFIED: ICD-10-CM

## 2024-01-02 RX ORDER — LORAZEPAM 0.5 MG/1
0.5 TABLET ORAL
Qty: 2 | Refills: 0 | Status: COMPLETED | COMMUNITY
Start: 2024-01-02 | End: 2024-01-09

## 2024-01-04 ENCOUNTER — APPOINTMENT (OUTPATIENT)
Dept: MRI IMAGING | Facility: CLINIC | Age: 49
End: 2024-01-04
Payer: COMMERCIAL

## 2024-01-04 ENCOUNTER — OUTPATIENT (OUTPATIENT)
Dept: OUTPATIENT SERVICES | Facility: HOSPITAL | Age: 49
LOS: 1 days | End: 2024-01-04
Payer: COMMERCIAL

## 2024-01-04 DIAGNOSIS — R41.3 OTHER AMNESIA: ICD-10-CM

## 2024-01-04 DIAGNOSIS — F32.A DEPRESSION, UNSPECIFIED: ICD-10-CM

## 2024-01-04 PROCEDURE — 70551 MRI BRAIN STEM W/O DYE: CPT

## 2024-01-04 PROCEDURE — 70551 MRI BRAIN STEM W/O DYE: CPT | Mod: 26

## 2024-01-05 ENCOUNTER — APPOINTMENT (OUTPATIENT)
Dept: INTERNAL MEDICINE | Facility: CLINIC | Age: 49
End: 2024-01-05
Payer: COMMERCIAL

## 2024-01-05 ENCOUNTER — OUTPATIENT (OUTPATIENT)
Dept: OUTPATIENT SERVICES | Facility: HOSPITAL | Age: 49
LOS: 1 days | End: 2024-01-05
Payer: SELF-PAY

## 2024-01-05 DIAGNOSIS — F32.A DEPRESSION, UNSPECIFIED: ICD-10-CM

## 2024-01-05 DIAGNOSIS — I10 ESSENTIAL (PRIMARY) HYPERTENSION: ICD-10-CM

## 2024-01-05 PROCEDURE — 90834 PSYTX W PT 45 MINUTES: CPT

## 2024-01-12 ENCOUNTER — TRANSCRIPTION ENCOUNTER (OUTPATIENT)
Age: 49
End: 2024-01-12

## 2024-01-18 ENCOUNTER — APPOINTMENT (OUTPATIENT)
Dept: OPHTHALMOLOGY | Facility: CLINIC | Age: 49
End: 2024-01-18
Payer: COMMERCIAL

## 2024-01-18 ENCOUNTER — NON-APPOINTMENT (OUTPATIENT)
Age: 49
End: 2024-01-18

## 2024-01-18 PROCEDURE — 92014 COMPRE OPH EXAM EST PT 1/>: CPT

## 2024-01-18 PROCEDURE — 92133 CPTRZD OPH DX IMG PST SGM ON: CPT

## 2024-01-18 PROCEDURE — 92083 EXTENDED VISUAL FIELD XM: CPT

## 2024-01-23 ENCOUNTER — OUTPATIENT (OUTPATIENT)
Dept: OUTPATIENT SERVICES | Facility: HOSPITAL | Age: 49
LOS: 1 days | End: 2024-01-23
Payer: SELF-PAY

## 2024-01-23 ENCOUNTER — APPOINTMENT (OUTPATIENT)
Dept: INTERNAL MEDICINE | Facility: CLINIC | Age: 49
End: 2024-01-23
Payer: COMMERCIAL

## 2024-01-23 VITALS
HEART RATE: 96 BPM | BODY MASS INDEX: 30.32 KG/M2 | OXYGEN SATURATION: 97 % | SYSTOLIC BLOOD PRESSURE: 128 MMHG | HEIGHT: 65 IN | DIASTOLIC BLOOD PRESSURE: 70 MMHG | WEIGHT: 182 LBS

## 2024-01-23 DIAGNOSIS — F32.A DEPRESSION, UNSPECIFIED: ICD-10-CM

## 2024-01-23 DIAGNOSIS — R41.3 OTHER AMNESIA: ICD-10-CM

## 2024-01-23 DIAGNOSIS — I10 ESSENTIAL (PRIMARY) HYPERTENSION: ICD-10-CM

## 2024-01-23 PROCEDURE — G0463: CPT

## 2024-01-23 PROCEDURE — 99213 OFFICE O/P EST LOW 20 MIN: CPT | Mod: GC

## 2024-01-24 PROBLEM — F32.A DEPRESSION, UNSPECIFIED DEPRESSION TYPE: Status: ACTIVE | Noted: 2023-12-19

## 2024-01-24 NOTE — PHYSICAL EXAM
[No Acute Distress] : no acute distress [Well Nourished] : well nourished [Well Developed] : well developed [Normal Sclera/Conjunctiva] : normal sclera/conjunctiva [PERRL] : pupils equal round and reactive to light [EOMI] : extraocular movements intact [Normal Outer Ear/Nose] : the outer ears and nose were normal in appearance [Normal Oropharynx] : the oropharynx was normal [No JVD] : no jugular venous distention [No Lymphadenopathy] : no lymphadenopathy [Supple] : supple [Thyroid Normal, No Nodules] : the thyroid was normal and there were no nodules present [No Respiratory Distress] : no respiratory distress  [No Accessory Muscle Use] : no accessory muscle use [Clear to Auscultation] : lungs were clear to auscultation bilaterally [Regular Rhythm] : with a regular rhythm [Normal Rate] : normal rate  [Normal S1, S2] : normal S1 and S2 [No Murmur] : no murmur heard [Soft] : abdomen soft [No Edema] : there was no peripheral edema [Non Tender] : non-tender [Non-distended] : non-distended [Normal Bowel Sounds] : normal bowel sounds [No CVA Tenderness] : no CVA  tenderness [No Spinal Tenderness] : no spinal tenderness [No Joint Swelling] : no joint swelling [No Rash] : no rash [Normal Affect] : the affect was normal [Normal Insight/Judgement] : insight and judgment were intact

## 2024-01-26 NOTE — PHYSICAL THERAPY INITIAL EVALUATION ADULT - STANDING BALANCE: DYNAMIC, REHAB EVAL
fair balance Partially impaired: cannot see medication labels or newsprint, but can see obstacles in path, and the surrounding layout; can count fingers at arm's length

## 2024-02-13 ENCOUNTER — OUTPATIENT (OUTPATIENT)
Dept: OUTPATIENT SERVICES | Facility: HOSPITAL | Age: 49
LOS: 1 days | End: 2024-02-13
Payer: SELF-PAY

## 2024-02-13 ENCOUNTER — APPOINTMENT (OUTPATIENT)
Dept: NEUROLOGY | Facility: HOSPITAL | Age: 49
End: 2024-02-13
Payer: COMMERCIAL

## 2024-02-13 VITALS — TEMPERATURE: 96.2 F | HEART RATE: 79 BPM | DIASTOLIC BLOOD PRESSURE: 79 MMHG | SYSTOLIC BLOOD PRESSURE: 120 MMHG

## 2024-02-13 DIAGNOSIS — R51.9 HEADACHE, UNSPECIFIED: ICD-10-CM

## 2024-02-13 PROCEDURE — 36415 COLL VENOUS BLD VENIPUNCTURE: CPT

## 2024-02-13 PROCEDURE — 85730 THROMBOPLASTIN TIME PARTIAL: CPT

## 2024-02-13 PROCEDURE — G0463: CPT

## 2024-02-13 PROCEDURE — 85027 COMPLETE CBC AUTOMATED: CPT

## 2024-02-13 PROCEDURE — 85610 PROTHROMBIN TIME: CPT

## 2024-02-13 PROCEDURE — 99204 OFFICE O/P NEW MOD 45 MIN: CPT

## 2024-02-13 PROCEDURE — G2211 COMPLEX E/M VISIT ADD ON: CPT

## 2024-02-13 NOTE — DISCUSSION/SUMMARY
[FreeTextEntry1] : 48y M with Hx memory loss (>1 year), abnormal MOCA (21/30), cerebellar/brainstem atrophy appreciated on MRI. Favor developmental etiology as opposed to neurodegenerative process. Would obtain LP to rule-out reversible etiologies such as inflammation  [] referral to IR for LP. Send CSF for cell count, glucose, protein, LDH, autoimmune encephalitis, Lyme, WNV, PCR panel, gram stain, culture, oligoclonal bands, myelin basic protein [] would increase Zoloft dose. Spoke w/ PCP Dr. Quinn, who voiced understanding. [] send CBC, PT, PTT, INR (for LP) [] RTC in 2-3 months  Case discussed and staffed at bedside w/ attending Dr. Sanon

## 2024-02-13 NOTE — REVIEW OF SYSTEMS
[Depression] : depression [Memory Lapses or Loss] : memory loss [Negative] : Heme/Lymph [Facial Weakness] : no facial weakness [Arm Weakness] : no arm weakness [Hand Weakness] : no hand weakness [Leg Weakness] : no leg weakness [Difficulties in Speech] : no speech difficulties [Numbness] : no numbness [Tingling] : no tingling [Lightheadedness] : no lightheadedness [Vertigo] : no vertigo [Cluster Headache] : no cluster headache [Migraine Headache] : no migraine headache [Tension Headache] : no tension-type headache [Difficulty Walking] : no difficulty walking

## 2024-02-13 NOTE — PHYSICAL EXAM
[___ / 5] : Visuospatial / Executive: [unfilled] / 5 [___ / 3] : Attention (Serial 7 subtraction): [unfilled] / 3 [___ / 1] : Fluency: [unfilled] / 1 [___ / 2] : Abstraction: [unfilled] / 2 [___ / 5] : Delayed Recall: [unfilled] / 5 [___ / 6] : Orientation: [unfilled] / 6 [General Appearance - Alert] : alert [General Appearance - In No Acute Distress] : in no acute distress [General Appearance - Well Developed] : well developed [General Appearance - Well Nourished] : well nourished [General Appearance - Well-Appearing] : healthy appearing [Oriented To Time, Place, And Person] : oriented to person, place, and time [Impaired Insight] : insight and judgment were intact [Affect] : the affect was normal [Mood] : the mood was normal [Memory Remote] : remote memory was not impaired [Person] : oriented to person [Place] : oriented to place [Time] : oriented to time [Remote Intact] : remote memory intact [Registration Intact] : recent registration memory intact [Span Intact] : the attention span was normal [Concentration Intact] : normal concentrating ability [Naming Objects] : no difficulty naming common objects [Fluency] : fluency intact [Comprehension] : comprehension intact [Vocabulary] : adequate range of vocabulary [Cranial Nerves Optic (II)] : visual acuity intact bilaterally,  visual fields full to confrontation, pupils equal round and reactive to light [Cranial Nerves Oculomotor (III)] : extraocular motion intact [Cranial Nerves Trigeminal (V)] : facial sensation intact symmetrically [Cranial Nerves Facial (VII)] : face symmetrical [Cranial Nerves Vestibulocochlear (VIII)] : hearing was intact bilaterally [Cranial Nerves Glossopharyngeal (IX)] : tongue and palate midline [Cranial Nerves Accessory (XI - Cranial And Spinal)] : head turning and shoulder shrug symmetric [Cranial Nerves Hypoglossal (XII)] : there was no tongue deviation with protrusion [Motor Tone] : muscle tone was normal in all four extremities [Motor Strength] : muscle strength was normal in all four extremities [Sensation Tactile Decrease] : light touch was intact [Balance] : balance was intact [2+] : Ankle jerk left 2+ [Sclera] : the sclera and conjunctiva were normal [PERRL With Normal Accommodation] : pupils were equal in size, round, reactive to light, with normal accommodation [Optic Disc Abnormality] : the optic disc were normal in size and color [Full Visual Field] : full visual field [Outer Ear] : the ears and nose were normal in appearance [Hearing Threshold Finger Rub Not [place]] : hearing was normal [Neck Cervical Mass (___cm)] : no neck mass was observed [Neck Appearance] : the appearance of the neck was normal [Thyroid Diffuse Enlargement] : the thyroid was not enlarged [] : no respiratory distress [Exaggerated Use Of Accessory Muscles For Inspiration] : no accessory muscle use [Edema] : there was no peripheral edema [Involuntary Movements] : no involuntary movements were seen [Skin Color & Pigmentation] : normal skin color and pigmentation [Short Term Intact] : short term memory impaired [Repeating Phrases] : difficulty repeating a phrase [Paresis Pronator Drift Right-Sided] : no pronator drift on the right [Paresis Pronator Drift Left-Sided] : no pronator drift on the left [Motor Strength Upper Extremities Bilaterally] : strength was normal in both upper extremities [Motor Strength Lower Extremities Bilaterally] : strength was normal in both lower extremities [Romberg's Sign] : Romberg's sign was negtive [Tremor] : no tremor present [Dysdiadochokinesia Bilaterally] : not present [Coordination - Dysmetria Impaired Finger-to-Nose Bilateral] : not present [Coordination - Dysmetria Impaired Heel-to-Shin Bilateral] : not present [MocaTotal] : 21 21 [FreeTextEntry8] : slightly stooped gait

## 2024-02-13 NOTE — HISTORY OF PRESENT ILLNESS
[FreeTextEntry1] : 48y M with Hx DM1, hypothyroidism, depression, HLD, who presents as initial evaluation for memory loss. Issue has been ongoing for 1 year. He is not currently working. He is independent in ADLs. He states LTM is impaired, but LTM is intact. He is forgetful of other people, except for family members. Denies tremors, getting lost in familiar places. Mom notices that he walks slow and sometimes will fall in the AM. He is slow to find his words at times. No focal weakness, numbness, tingling, dizziness, headaches, or visual loss. He had normal development as child. No neurological disease in the family. He was referred here by PCP due to memory loss and abnormal MRI (cerebellar and brainstem atrophy). Normal folate, B12, RPR, CMP. He has been on Zoloft for past 2 months and has noticed some improvements, talking and more helpful around the house. Lives with his mother.

## 2024-02-13 NOTE — END OF VISIT
[] : Resident [FreeTextEntry3] : nonfocal neuro exam.  cognitive dysfunction of unclear etiology.  MRI with substantial brainstem and cerebellar atrophy.   AP: unclear etiology. MRI suggests a chronic or remote process, but pt reports only 1y of cognitive dysfunction (pt's mother reports some more subtly cognitive changes for several years prior). Pt is a college graduate, so clinically does not seem like a developmental issue. check LP. rtc after LP. might benefit from opinion from cognitive neurology specialist if pt can afford it.   mood can certainly play a role in cognitive dysfunction and it is being addressed by pmd. may benefit from seeing psychiatrist. [Time Spent: ___ minutes] : I have spent [unfilled] minutes of time on the encounter.

## 2024-02-15 DIAGNOSIS — F09 UNSPECIFIED MENTAL DISORDER DUE TO KNOWN PHYSIOLOGICAL CONDITION: ICD-10-CM

## 2024-02-16 ENCOUNTER — APPOINTMENT (OUTPATIENT)
Dept: INTERNAL MEDICINE | Facility: CLINIC | Age: 49
End: 2024-02-16
Payer: COMMERCIAL

## 2024-02-16 ENCOUNTER — OUTPATIENT (OUTPATIENT)
Dept: OUTPATIENT SERVICES | Facility: HOSPITAL | Age: 49
LOS: 1 days | End: 2024-02-16
Payer: SELF-PAY

## 2024-02-16 DIAGNOSIS — I10 ESSENTIAL (PRIMARY) HYPERTENSION: ICD-10-CM

## 2024-02-16 PROCEDURE — 90836 PSYTX W PT W E/M 45 MIN: CPT

## 2024-02-16 PROCEDURE — 90834 PSYTX W PT 45 MINUTES: CPT

## 2024-02-20 ENCOUNTER — NON-APPOINTMENT (OUTPATIENT)
Age: 49
End: 2024-02-20

## 2024-02-23 ENCOUNTER — OUTPATIENT (OUTPATIENT)
Dept: OUTPATIENT SERVICES | Facility: HOSPITAL | Age: 49
LOS: 1 days | End: 2024-02-23
Payer: SELF-PAY

## 2024-02-23 ENCOUNTER — RESULT REVIEW (OUTPATIENT)
Age: 49
End: 2024-02-23

## 2024-02-23 ENCOUNTER — APPOINTMENT (OUTPATIENT)
Dept: RADIOLOGY | Facility: HOSPITAL | Age: 49
End: 2024-02-23
Payer: COMMERCIAL

## 2024-02-23 DIAGNOSIS — R41.3 OTHER AMNESIA: ICD-10-CM

## 2024-02-23 PROCEDURE — 89051 BODY FLUID CELL COUNT: CPT

## 2024-02-23 PROCEDURE — 83916 OLIGOCLONAL BANDS: CPT

## 2024-02-23 PROCEDURE — 82042 OTHER SOURCE ALBUMIN QUAN EA: CPT

## 2024-02-23 PROCEDURE — 84157 ASSAY OF PROTEIN OTHER: CPT

## 2024-02-23 PROCEDURE — 86341 ISLET CELL ANTIBODY: CPT

## 2024-02-23 PROCEDURE — 86255 FLUORESCENT ANTIBODY SCREEN: CPT

## 2024-02-23 PROCEDURE — 62328 DX LMBR SPI PNXR W/FLUOR/CT: CPT

## 2024-02-23 PROCEDURE — 87483 CNS DNA AMP PROBE TYPE 12-25: CPT

## 2024-02-23 PROCEDURE — 82040 ASSAY OF SERUM ALBUMIN: CPT

## 2024-02-23 PROCEDURE — 82945 GLUCOSE OTHER FLUID: CPT

## 2024-02-23 PROCEDURE — 82784 ASSAY IGA/IGD/IGG/IGM EACH: CPT

## 2024-02-28 DIAGNOSIS — R41.9 UNSPECIFIED SYMPTOMS AND SIGNS INVOLVING COGNITIVE FUNCTIONS AND AWARENESS: ICD-10-CM

## 2024-03-05 ENCOUNTER — NON-APPOINTMENT (OUTPATIENT)
Age: 49
End: 2024-03-05

## 2024-03-21 ENCOUNTER — APPOINTMENT (OUTPATIENT)
Dept: ENDOCRINOLOGY | Facility: HOSPITAL | Age: 49
End: 2024-03-21

## 2024-04-02 ENCOUNTER — APPOINTMENT (OUTPATIENT)
Dept: INTERNAL MEDICINE | Facility: CLINIC | Age: 49
End: 2024-04-02
Payer: COMMERCIAL

## 2024-04-02 ENCOUNTER — OUTPATIENT (OUTPATIENT)
Dept: OUTPATIENT SERVICES | Facility: HOSPITAL | Age: 49
LOS: 1 days | End: 2024-04-02
Payer: SELF-PAY

## 2024-04-02 VITALS
HEIGHT: 65 IN | HEART RATE: 84 BPM | OXYGEN SATURATION: 98 % | DIASTOLIC BLOOD PRESSURE: 70 MMHG | WEIGHT: 175 LBS | BODY MASS INDEX: 29.16 KG/M2 | SYSTOLIC BLOOD PRESSURE: 130 MMHG

## 2024-04-02 DIAGNOSIS — L30.8 OTHER SPECIFIED DERMATITIS: ICD-10-CM

## 2024-04-02 PROCEDURE — G0463: CPT

## 2024-04-02 PROCEDURE — 99214 OFFICE O/P EST MOD 30 MIN: CPT | Mod: GC

## 2024-04-02 RX ORDER — CLOTRIMAZOLE 10 MG/G
1 CREAM TOPICAL 3 TIMES DAILY
Qty: 1 | Refills: 3 | Status: ACTIVE | COMMUNITY
Start: 2024-04-02 | End: 1900-01-01

## 2024-04-02 NOTE — END OF VISIT
[] : Resident [FreeTextEntry3] : In addition to above, discussed increasing sertraline for addn'l mood benefit. Mother and pt decline.

## 2024-04-02 NOTE — HISTORY OF PRESENT ILLNESS
[FreeTextEntry1] : RPA [de-identified] : Mr. Sepulveda is a 47 yo M with pmh of DM1, hypothyroidism, and enuresis presenting for follow up for depression and trouble with memory. Since our last visit, the patient was able to see neurology. Unclear diagnosis thus far, notably MRI showed significant atrophy of the cerebellum and brainstem of unclear etiology. Subsequent lumbar puncture revealed unremarkable CSF, notably no oligoclonal bands. Has met with Al in the meantime who describes Mr. Sepulveda as "having lack of motivation, apathy, alexithymia, profound confusion, unclear neurological finding." Al believes there may be something more going on than simply behavioral. Further neurology workup includes autoimmune encephalopathy panel, Lyme, WNV, MBP, LDH, next appointment May 7th. CPE due October.  In Al's last note, he mentioned that the patient revealed his father worked full time at [employer], has SS#, and patient may therefore be eligible for Medicaid. Mom Concepción says that she reached out to someone for the state about getting insurance, was told the patient would only qualify for emergency medicaid. She says the patient has an SSN that he got with work visa permit some years ago, just had interview and is working on getting green card.   Concepción says patient feeling better, not getting as angry so she thinks sertraline is working. The patient is unsure if the medication is helping and on further questioning was unsure what medications he was taking. Otherwise no improvement or worsening of symptoms per the patient and Concepción. ROS positive for new rash (see exam). Red border with central clearing, appearing annular, has gotten them before. Otherwise denies ROS.

## 2024-04-02 NOTE — PHYSICAL EXAM
[No Edema] : there was no peripheral edema [Normal] : affect was normal and insight and judgment were intact [de-identified] : Notable annular patch over anterior left antecubitus with central celaring, annular in nature, 4inch in diameter. Similar lesion 1cm in diameter onanterior left wrist, posterior right upper extremity, lesions fluroresecnt under Woods lamp  [de-identified] : Cognition slow, trouble with recent memory and conversation, unclear if patient is following discussion

## 2024-04-02 NOTE — ASSESSMENT
[FreeTextEntry1] : Mr. Sepulveda is a 47 yo M with pmh of DM1, hypothyroidism, and enuresis presenting for follow up for memory loss, depressed mood and poor functional status. Neuro workup pending, MRI with notable changes and CSF unremarkable, follow up next month. Cont sertraline. New annular patches with fluorescence under Wood's lamp c/f tinea, will trial patient on clotrimazole cream. RTC three months.    #depressed mood - May be related to unspecified neurodegenerative disease, less likely behavioral - Cont sertraline to 50mg daily - Continue therapy with Al  - patient will call to schedule  #Subacute memory loss - Folate, B12, RPR, and CMP all WNL - MRI with cerebellar and brain stem atrophy, unclear significance - CSF unremarkable negative for oligoclonal bands - Neuro workup pending including autoimmune encephalopathy panel, Lyme, WNV, MBP, LDH  - Neuro next appointment May 7th - F/u in three months   #Type 1 diabetes mellitus  - Diagnosed at age 8 - a1c 9.7% in March 2023 - current medications basaglar 20 units qhs, humalog 3/5/4, metformin 500mg bid (reports not taking metformin) via dispensary [facility] - A1C check with Endo on April 4th  #annular rash - Diffuse patches with red raised border, central clearing, fluorescent under Wood lamp c/f tinea - likely due to nightly bedwetting, moisture on skin - Trial clotrimazole cream  #Health Maintenance - CPE due in October  - RTC for follow up in 3 months  Case d/w Dr. Callum Quinn, PGY-1.

## 2024-04-04 ENCOUNTER — APPOINTMENT (OUTPATIENT)
Dept: ENDOCRINOLOGY | Facility: HOSPITAL | Age: 49
End: 2024-04-04
Payer: COMMERCIAL

## 2024-04-04 ENCOUNTER — OUTPATIENT (OUTPATIENT)
Dept: OUTPATIENT SERVICES | Facility: HOSPITAL | Age: 49
LOS: 1 days | End: 2024-04-04
Payer: SELF-PAY

## 2024-04-04 VITALS
SYSTOLIC BLOOD PRESSURE: 129 MMHG | RESPIRATION RATE: 16 BRPM | OXYGEN SATURATION: 98 % | BODY MASS INDEX: 28.82 KG/M2 | DIASTOLIC BLOOD PRESSURE: 84 MMHG | WEIGHT: 173 LBS | TEMPERATURE: 98 F | HEART RATE: 86 BPM | HEIGHT: 65 IN

## 2024-04-04 DIAGNOSIS — E06.9 THYROIDITIS, UNSPECIFIED: ICD-10-CM

## 2024-04-04 DIAGNOSIS — E10.39 TYPE 1 DIABETES MELLITUS WITH OTHER DIABETIC OPHTHALMIC COMPLICATION: ICD-10-CM

## 2024-04-04 DIAGNOSIS — E78.5 HYPERLIPIDEMIA, UNSPECIFIED: ICD-10-CM

## 2024-04-04 DIAGNOSIS — E03.9 HYPOTHYROIDISM, UNSPECIFIED: ICD-10-CM

## 2024-04-04 DIAGNOSIS — E16.2 HYPOGLYCEMIA, UNSPECIFIED: ICD-10-CM

## 2024-04-04 LAB
24R-OH-CALCIDIOL SERPL-MCNC: 38.1 NG/ML — SIGNIFICANT CHANGE UP (ref 30–80)
A1C WITH ESTIMATED AVERAGE GLUCOSE RESULT: 9.7 % — HIGH (ref 4–5.6)
ALBUMIN SERPL ELPH-MCNC: 3.7 G/DL — SIGNIFICANT CHANGE UP (ref 3.3–5)
ALP SERPL-CCNC: 89 U/L — SIGNIFICANT CHANGE UP (ref 40–120)
ALT FLD-CCNC: 16 U/L — SIGNIFICANT CHANGE UP (ref 10–45)
ANION GAP SERPL CALC-SCNC: 11 MMOL/L — SIGNIFICANT CHANGE UP (ref 5–17)
AST SERPL-CCNC: 10 U/L — SIGNIFICANT CHANGE UP (ref 10–40)
BILIRUB SERPL-MCNC: 0.3 MG/DL — SIGNIFICANT CHANGE UP (ref 0.2–1.2)
BUN SERPL-MCNC: 14 MG/DL — SIGNIFICANT CHANGE UP (ref 7–23)
CALCIUM SERPL-MCNC: 8.9 MG/DL — SIGNIFICANT CHANGE UP (ref 8.4–10.5)
CHLORIDE SERPL-SCNC: 100 MMOL/L — SIGNIFICANT CHANGE UP (ref 96–108)
CHOLEST SERPL-MCNC: 122 MG/DL — SIGNIFICANT CHANGE UP
CO2 SERPL-SCNC: 30 MMOL/L — SIGNIFICANT CHANGE UP (ref 22–31)
CREAT SERPL-MCNC: 0.6 MG/DL — SIGNIFICANT CHANGE UP (ref 0.5–1.3)
EGFR: 120 ML/MIN/1.73M2 — SIGNIFICANT CHANGE UP
ESTIMATED AVERAGE GLUCOSE: 232 MG/DL — HIGH (ref 68–114)
GLUCOSE SERPL-MCNC: 77 MG/DL — SIGNIFICANT CHANGE UP (ref 70–99)
HBA1C MFR BLD HPLC: 9.4
HDLC SERPL-MCNC: 58 MG/DL — SIGNIFICANT CHANGE UP
LIPID PNL WITH DIRECT LDL SERPL: 54 MG/DL — SIGNIFICANT CHANGE UP
NON HDL CHOLESTEROL: 64 MG/DL — SIGNIFICANT CHANGE UP
POTASSIUM SERPL-MCNC: 5.1 MMOL/L — SIGNIFICANT CHANGE UP (ref 3.5–5.3)
POTASSIUM SERPL-SCNC: 5.1 MMOL/L — SIGNIFICANT CHANGE UP (ref 3.5–5.3)
PROT SERPL-MCNC: 7.4 G/DL — SIGNIFICANT CHANGE UP (ref 6–8.3)
SODIUM SERPL-SCNC: 141 MMOL/L — SIGNIFICANT CHANGE UP (ref 135–145)
T4 FREE SERPL-MCNC: 1.7 NG/DL — SIGNIFICANT CHANGE UP (ref 0.9–1.8)
TRIGL SERPL-MCNC: 41 MG/DL — SIGNIFICANT CHANGE UP
TSH SERPL-MCNC: 0.71 UIU/ML — SIGNIFICANT CHANGE UP (ref 0.27–4.2)

## 2024-04-04 PROCEDURE — 84439 ASSAY OF FREE THYROXINE: CPT

## 2024-04-04 PROCEDURE — 84443 ASSAY THYROID STIM HORMONE: CPT

## 2024-04-04 PROCEDURE — 83036 HEMOGLOBIN GLYCOSYLATED A1C: CPT

## 2024-04-04 PROCEDURE — 80053 COMPREHEN METABOLIC PANEL: CPT

## 2024-04-04 PROCEDURE — 80061 LIPID PANEL: CPT

## 2024-04-04 PROCEDURE — 82306 VITAMIN D 25 HYDROXY: CPT

## 2024-04-04 PROCEDURE — 82043 UR ALBUMIN QUANTITATIVE: CPT

## 2024-04-04 PROCEDURE — ZZZZZ: CPT | Mod: GC

## 2024-04-04 RX ORDER — INSULIN LISPRO 100 [IU]/ML
100 INJECTION, SOLUTION INTRAVENOUS; SUBCUTANEOUS
Qty: 1 | Refills: 3 | Status: ACTIVE | COMMUNITY
Start: 2020-06-18 | End: 1900-01-01

## 2024-04-04 RX ORDER — INSULIN GLARGINE 100 [IU]/ML
100 INJECTION, SOLUTION SUBCUTANEOUS
Qty: 2 | Refills: 6 | Status: ACTIVE | COMMUNITY
Start: 2020-06-18 | End: 1900-01-01

## 2024-04-04 RX ORDER — METFORMIN HYDROCHLORIDE 500 MG/1
500 TABLET, FILM COATED, EXTENDED RELEASE ORAL
Qty: 90 | Refills: 3 | Status: COMPLETED | COMMUNITY
Start: 2023-11-30 | End: 2024-04-04

## 2024-04-04 RX ORDER — ATORVASTATIN CALCIUM 20 MG/1
20 TABLET, FILM COATED ORAL
Qty: 90 | Refills: 2 | Status: ACTIVE | COMMUNITY
Start: 2023-11-30 | End: 1900-01-01

## 2024-04-04 RX ORDER — LEVOTHYROXINE SODIUM 0.15 MG/1
150 TABLET ORAL DAILY
Qty: 90 | Refills: 3 | Status: ACTIVE | COMMUNITY
Start: 2018-04-19 | End: 1900-01-01

## 2024-04-04 NOTE — REVIEW OF SYSTEMS
[As Noted in HPI] : as noted in HPI [Polyuria] : no polyuria [Muscle Weakness] : no muscle weakness [Myalgia] : no myalgia  [Pain/Numbness of Digits] : no pain/numbness of digits

## 2024-04-04 NOTE — HISTORY OF PRESENT ILLNESS
[FreeTextEntry1] : 48 year old man with history of uncontrolled DM1, hypothyroidism, history of non-traumatic fracture of right tibia/fibula presents for follow up of DM1.  No interval updates or hospitalizations. Feeling well. Has made improvements in diet.  DM type 1: Patient was diagnosed with DM1 at age 8. A1c 8.8% in Feb 2022, up to 9.2% in June 2022, 9.1% in Dec 2022,9.7% March 2023. - recent labs: 4/4/24 A1c 9.4%, 12/23 , 3/23 urine microalbumin WNL, LDL 81, TG 44, HDL 78 - current medications basaglar 24 units qhs, humalog 5-7 units tid depending on what he is eating and sugar, not taking metformin - SMBG: freestyle roel 14 day past 14 days 11% in target, 82% above target, 7% below target, target 100-150, trend: bedtime 350, wake up around 200, before lunch 250, before dinner low 300s, past 14 days - 6 low events, 4 early morning, 1 in the afternoon, 1 in the afternoon - statin: atorvastatin 20mg daily - ACE/ARB: not on ace/arb, /84 - Diet Breakfast: coffee with milk, cereal Lunch: bread with turkey Dinner: "anything", biggest meal No snacks. - Exercise: denies 10/23 saw optho with mild NPDR No macrovascular complications.  Follows with podiatrist. No foot ulceration. Last visit in March 2023. Reports needs an appointment Urine microalbumin / Cr negative Mar 2023.  Hypothyroidism: Takes 150 mcg of LT4 reports taking at night, reports takes it without food. Does not miss doses. Denies palpitations, tremors, loose stools, constipation, heat or cold intolerance, unexplained weight changes, neck pain, dysphagia, dysphonia, skin changes, hair loss  History of right tibial and fibula fracture: s/p surgery with ortho in March 2018. BMD July 2018 normal: L spine -0.9, left femoral neck -0.9, left total hip -1.1. Work up for fracture negative - vitamin D levels normal, calcium normal, TFTs normal; testosterone noted to be mildly low previously with normal LH and FSH, however testosterone was not drawn in the AM. Patient with normal libido and energy levels at this time. Takes a vitamin D supplement daily - takes 1000 units daily. 25 D normal in Sept 2022. Does not take a calcium supplement. No interval fxs or falls.  - does not currently take vitamin D, denies interval falls or fractures - currently reports no change in libido, denies fatigue

## 2024-04-04 NOTE — PHYSICAL EXAM
[Alert] : alert [No Acute Distress] : no acute distress [Normal Sclera/Conjunctiva] : normal sclera/conjunctiva [No Proptosis] : no proptosis [No Neck Mass] : no neck mass was observed [No LAD] : no lymphadenopathy [Thyroid Not Enlarged] : the thyroid was not enlarged [No Respiratory Distress] : no respiratory distress [Clear to Auscultation] : lungs were clear to auscultation bilaterally [Normal S1, S2] : normal S1 and S2 [Regular Rhythm] : with a regular rhythm [Normal Bowel Sounds] : normal bowel sounds [Soft] : abdomen soft [No Stigmata of Cushings Syndrome] : no stigmata of Cushings Syndrome [No Involuntary Movements] : no involuntary movements were seen [Normal] : normal [No Motor Deficits] : the motor exam was normal [Normal Affect] : the affect was normal [Normal Mood] : the mood was normal [Acanthosis Nigricans] : no acanthosis nigricans [FreeTextEntry4] : intact to gross palpation [FreeTextEntry8] : intact to gross palpation

## 2024-04-04 NOTE — END OF VISIT
[] : Fellow [FreeTextEntry3] : 48-year-old man with Type 1 diabetes, hypothyroidism and hyperlipidemia, here for endocrinology follow-up.  Currently using basal bolus regimen with Basaglar 24 units at bedtime, Admelog 4-6 units 3 times daily with meals, low-dose sliding scale, roel sensor showed that the patient with postprandial hyperglycemia. Recommend decreasing Basaglar to 2020 units at bedtime given a.m. hypoglycemia, insulin lispro 6 units 3 times daily with meals with low-dose sliding scale. Carb consistent diet stressed again. For hypothyroidism, continue with levothyroxine 150 mcg once daily. Check vitamin D 25-hydroxy level today. Follow-up with patient in 3 months to repeat A1c level. Encourage patient to follow-up with ophthalmologist and podiatrist once yearly.

## 2024-04-04 NOTE — ASSESSMENT
[FreeTextEntry1] : 48 year old man with uncontrolled DM1, hypothyroidism, and HLD  1. DM1, uncontrolled and HLD - recent labs: 4/4/24 A1c 9.4%, 12/23 , 3/23 urine microalbumin WNL, LDL 81, TG 44, HDL 78 - current medications basaglar 24 units qhs, humalog 5-7 units tid depending on what he is eating and sugar, not taking metformin - SMBG: freestyle roel 14 day past 14 days 11% in target, 82% above target, 7% below target, target 100-150, trend: bedtime 350, wake up around 200, before lunch 250, before dinner low 300s, past 14 days - 6 low events, 4 early morning, 1 in the afternoon, 1 in the afternoon - statin: atorvastatin 20mg daily - ACE/ARB: not on ace/arb, /84 PLAN - decrease basaglar to 22 units given am hypoglycemia - increase humalog to 6 units tid plus low sliding scale (1 unit for every 50 over 150) if small meal, 8 units tid plus low sliding scale if large meal  - continue smbg with roel 14 day, sensors refilled - should decrease basaglar if BG <90 in am or premeal humalog if premeal BG <90 - will defer metformin for now for possible insulin resistance given changes in insulin regimen as above - lifestyle modifications encouraged - routine opthalmology and podiatry follow up - check CMP, urine microalbumin, lipid panel - change simvastatin to atorvastatin 20mg daily  2. Hypothyroidism: - Clinically euthyroid and biochemically euthyroid - Continue LT4 150 mcg daily, discussed taking on an empty stomach - Check TFTs today  3. Vit D supplement s/p right tibia and fibula fracture:  Normal BMD July 2018. - check 25 vitamin D  RTC in 3 months  Case seen and discussed with Dr. Brown. Insulin regimen written down for patient.  Star Moeller MD Endocrinology Fellow

## 2024-04-05 DIAGNOSIS — E16.2 HYPOGLYCEMIA, UNSPECIFIED: ICD-10-CM

## 2024-04-05 DIAGNOSIS — E03.9 HYPOTHYROIDISM, UNSPECIFIED: ICD-10-CM

## 2024-04-05 DIAGNOSIS — E78.00 PURE HYPERCHOLESTEROLEMIA, UNSPECIFIED: ICD-10-CM

## 2024-04-05 DIAGNOSIS — Z00.00 ENCOUNTER FOR GENERAL ADULT MEDICAL EXAMINATION WITHOUT ABNORMAL FINDINGS: ICD-10-CM

## 2024-04-05 DIAGNOSIS — E78.5 HYPERLIPIDEMIA, UNSPECIFIED: ICD-10-CM

## 2024-04-05 DIAGNOSIS — E11.9 TYPE 2 DIABETES MELLITUS WITHOUT COMPLICATIONS: ICD-10-CM

## 2024-04-05 DIAGNOSIS — E10.39 TYPE 1 DIABETES MELLITUS WITH OTHER DIABETIC OPHTHALMIC COMPLICATION: ICD-10-CM

## 2024-04-05 DIAGNOSIS — I10 ESSENTIAL (PRIMARY) HYPERTENSION: ICD-10-CM

## 2024-04-05 LAB
ALBUMIN, RANDOM URINE: <1.2 MG/DL — SIGNIFICANT CHANGE UP
ALBUMIN/CREATININE RATIO (ACR): SIGNIFICANT CHANGE UP MG/G (ref 0–30)
CREAT ?TM UR-MCNC: 51 MG/DL — SIGNIFICANT CHANGE UP

## 2024-05-07 ENCOUNTER — OUTPATIENT (OUTPATIENT)
Dept: OUTPATIENT SERVICES | Facility: HOSPITAL | Age: 49
LOS: 1 days | End: 2024-05-07
Payer: SELF-PAY

## 2024-05-07 ENCOUNTER — APPOINTMENT (OUTPATIENT)
Dept: GASTROENTEROLOGY | Facility: HOSPITAL | Age: 49
End: 2024-05-07
Payer: COMMERCIAL

## 2024-05-07 ENCOUNTER — APPOINTMENT (OUTPATIENT)
Dept: NEUROLOGY | Facility: HOSPITAL | Age: 49
End: 2024-05-07
Payer: COMMERCIAL

## 2024-05-07 VITALS
HEART RATE: 71 BPM | SYSTOLIC BLOOD PRESSURE: 134 MMHG | BODY MASS INDEX: 28.82 KG/M2 | WEIGHT: 173 LBS | HEIGHT: 65 IN | TEMPERATURE: 98 F | OXYGEN SATURATION: 98 % | RESPIRATION RATE: 14 BRPM | DIASTOLIC BLOOD PRESSURE: 83 MMHG

## 2024-05-07 DIAGNOSIS — R10.9 UNSPECIFIED ABDOMINAL PAIN: ICD-10-CM

## 2024-05-07 DIAGNOSIS — Z80.0 FAMILY HISTORY OF MALIGNANT NEOPLASM OF DIGESTIVE ORGANS: ICD-10-CM

## 2024-05-07 DIAGNOSIS — R41.3 OTHER AMNESIA: ICD-10-CM

## 2024-05-07 DIAGNOSIS — R51.9 HEADACHE, UNSPECIFIED: ICD-10-CM

## 2024-05-07 DIAGNOSIS — R63.4 ABNORMAL WEIGHT LOSS: ICD-10-CM

## 2024-05-07 PROCEDURE — G0463: CPT

## 2024-05-07 PROCEDURE — 99215 OFFICE O/P EST HI 40 MIN: CPT

## 2024-05-07 PROCEDURE — ZZZZZ: CPT | Mod: GC

## 2024-05-07 RX ORDER — FLASH GLUCOSE SENSOR
KIT MISCELLANEOUS
Qty: 6 | Refills: 3 | Status: ACTIVE | COMMUNITY
Start: 2019-02-28 | End: 1900-01-01

## 2024-05-07 RX ORDER — POLYETHYLENE GLYCOL 3350 AND ELECTROLYTES WITH LEMON FLAVOR 236; 22.74; 6.74; 5.86; 2.97 G/4L; G/4L; G/4L; G/4L; G/4L
236 POWDER, FOR SOLUTION ORAL
Qty: 1 | Refills: 0 | Status: ACTIVE | COMMUNITY
Start: 2024-05-07 | End: 1900-01-01

## 2024-05-07 RX ORDER — METFORMIN HYDROCHLORIDE 500 MG/1
500 TABLET, COATED ORAL DAILY
Refills: 0 | Status: ACTIVE | COMMUNITY
Start: 2024-05-07

## 2024-05-07 NOTE — ASSESSMENT
[FreeTextEntry1] : 49 yo M with PMHx of IDDM1, hypothyroidism, depression, memory loss, and enuresis presents for colon cancer screening. Patient with 20 lbs weight loss x1 year. Patient with FHx gastric and colon ca.  #Unintentional Weight Loss #FHx gastric, pancreatic and colon ca  *gastric ca in paternal grandmother and maternal great great grandfather *colon ca in paternal grandmother's brother *mother reports possible pancreatic cancer hx as well *IDDM (dx when 9 years old) - continuous glucose monitor on insulin TIDAC & qHs  Recommendations: -plan for EGD/Colonoscopy -split prep with PEG and Dulcolax -full liquids day prior to procedure  -NPO at MN night prior to procedure -patient on basal/bolus insulin TIDAC & q Hs -patient to monitor bG with continuous monitor day prior to procedure while on CLD -patient to take 1/2 dose of long acting insulin night prior to procedure (takes 22u qHs normally, will take 11u night prior to procedure) -if EGD/Colonoscopy unrevealing will consider CTAP w/wo to w/u further for weight loss  Discussed with Dr. Dereje Lynn MD PGY-5 GI/Hepatology Fellow

## 2024-05-07 NOTE — END OF VISIT
[] : Resident [FreeTextEntry3] : 48M with dementia and imaging with marked cerebellar and brainstem atrophy, here for follow up. Functional status largely unchanged. Zoloft working, uninterested in uptitrating.  - C/w Zoloft for depression  F/u 6 months [Time Spent: ___ minutes] : I have spent [unfilled] minutes of time on the encounter.

## 2024-05-07 NOTE — HISTORY OF PRESENT ILLNESS
[FreeTextEntry1] : 47 yo M with PMHx of IDDM1, hypothyroidism, depression, memory loss, and enuresis presents for colon cancer screening. Patient is from [facility] (from city). Patient feels well today, denies n/v/f/c/heart burn/abdominal pain/dysphagia/odynophagia. He is at his baseline for bowel movements - he has 1 BM/day, no blood or black stool and he does not strain. Patient never had an EGD or colonoscopy before. He is not on any AC. Patient takes short acting insulin TiDAC with a continuous glucometer and chart to help with dosage. He takes 22 u of long acting insulin at night. He also takes metformin, sertraline, synthroid, and atorvastatin. He is compliant with medicaitons.  Patient noted with 20 lbs unintentional weight loss x1 year (vital trend confirmed with downtrend since May 2023), mother reports patients clothes have been fitting looser. He reports appetite the same, eating the same.  FHx of paternal grandfather with stomach cancer or pancreatic cancer as well as a great-great maternal grandmother with stomach cancer.   The mother reports paternal grandmother had a brother with colon ca.

## 2024-05-07 NOTE — END OF VISIT
[] : Fellow [FreeTextEntry3] : As modified and discussed with patient MD MARCIO Burton [facility] Associate Professor of Medicine [facility]

## 2024-05-07 NOTE — ASSESSMENT
[FreeTextEntry1] : 47 yo M with PMHx of IDDM1, hypothyroidism, depression, memory loss, and enuresis presents for colon cancer screening. Patient with 20 lbs weight loss x1 year. Patient with FHx gastric and colon ca.  #Unintentional Weight Loss #FHx gastric, pancreatic and colon ca  *gastric ca in paternal grandmother and maternal great great grandfather *colon ca in paternal grandmother's brother *mother reports possible pancreatic cancer hx as well *IDDM (dx when 9 years old) - continuous glucose monitor on insulin TIDAC & qHs  Recommendations: -plan for EGD/Colonoscopy -split prep with PEG and Dulcolax -full liquids day prior to procedure  -NPO at MN night prior to procedure -patient on basal/bolus insulin TIDAC & q Hs -patient to monitor bG with continuous monitor day prior to procedure while on CLD -patient to take 1/2 dose of long acting insulin night prior to procedure (takes 22u qHs normally, will take 11u night prior to procedure) -if EGD/Colonoscopy unrevealing will consider CTAP w/wo to w/u further for weight loss  Discussed with Dr. Dereje Lynn MD PGY-5 GI/Hepatology Fellow ,DirectAddress_Unknown

## 2024-05-07 NOTE — END OF VISIT
[] : Fellow [FreeTextEntry3] : As modified and discussed with patient MD MARCIO Burton [facility] Associate Professor of Medicine [facility]

## 2024-05-07 NOTE — HISTORY OF PRESENT ILLNESS
[FreeTextEntry1] : 47 yo M with PMHx of IDDM1, hypothyroidism, depression, memory loss, and enuresis presents for colon cancer screening. Patient is from [facility] (from city). Patient feels well today, denies n/v/f/c/heart burn/abdominal pain/dysphagia/odynophagia. He is at his baseline for bowel movements - he has 1 BM/day, no blood or black stool and he does not strain. Patient never had an EGD or colonoscopy before. He is not on any AC. Patient takes short acting insulin TiDAC with a continuous glucometer and chart to help with dosage. He takes 22 u of long acting insulin at night. He also takes metformin, sertraline, synthroid, and atorvastatin. He is compliant with medicaitons.  Patient noted with 20 lbs unintentional weight loss x1 year (vital trend confirmed with downtrend since May 2023), mother reports patients clothes have been fitting looser. He reports appetite the same, eating the same.  FHx of paternal grandfather with stomach cancer or pancreatic cancer as well as a great-great maternal grandmother with stomach cancer.   The mother reports paternal grandmother had a brother with colon ca.

## 2024-05-07 NOTE — PHYSICAL EXAM
[Alert] : alert [Healthy Appearing] : healthy appearing [No Acute Distress] : no acute distress [No Respiratory Distress] : no respiratory distress [No Acc Muscle Use] : no accessory muscle use [Bowel Sounds] : normal bowel sounds [Abdomen Tenderness] : non-tender [Abdomen Soft] : soft [Oriented To Time, Place, And Person] : oriented to person, place, and time [Normal Affect] : the affect was normal

## 2024-05-07 NOTE — PHYSICAL EXAM
[General Appearance - Alert] : alert [General Appearance - In No Acute Distress] : in no acute distress [General Appearance - Well Nourished] : well nourished [General Appearance - Well Developed] : well developed [General Appearance - Well-Appearing] : healthy appearing [Oriented To Time, Place, And Person] : oriented to person, place, and time [Impaired Insight] : insight and judgment were intact [Affect] : the affect was normal [Mood] : the mood was normal [Memory Remote] : remote memory was not impaired [Person] : oriented to person [Place] : oriented to place [Time] : oriented to time [Remote Intact] : remote memory intact [Registration Intact] : recent registration memory intact [Span Intact] : the attention span was normal [Concentration Intact] : normal concentrating ability [Naming Objects] : no difficulty naming common objects [Fluency] : fluency intact [Comprehension] : comprehension intact [Vocabulary] : adequate range of vocabulary [___ / 5] : Visuospatial / Executive: [unfilled] / 5 [___ / 3] : Attention (Serial 7 subtraction): [unfilled] / 3 [___ / 1] : Fluency: [unfilled] / 1 [___ / 2] : Abstraction: [unfilled] / 2 [___ / 5] : Delayed Recall: [unfilled] / 5 [___ / 6] : Orientation: [unfilled] / 6 [Cranial Nerves Optic (II)] : visual acuity intact bilaterally,  visual fields full to confrontation, pupils equal round and reactive to light [Cranial Nerves Oculomotor (III)] : extraocular motion intact [Cranial Nerves Trigeminal (V)] : facial sensation intact symmetrically [Cranial Nerves Facial (VII)] : face symmetrical [Cranial Nerves Vestibulocochlear (VIII)] : hearing was intact bilaterally [Cranial Nerves Glossopharyngeal (IX)] : tongue and palate midline [Cranial Nerves Accessory (XI - Cranial And Spinal)] : head turning and shoulder shrug symmetric [Cranial Nerves Hypoglossal (XII)] : there was no tongue deviation with protrusion [Motor Tone] : muscle tone was normal in all four extremities [Motor Strength] : muscle strength was normal in all four extremities [Sensation Tactile Decrease] : light touch was intact [Balance] : balance was intact [2+] : Ankle jerk left 2+ [Sclera] : the sclera and conjunctiva were normal [PERRL With Normal Accommodation] : pupils were equal in size, round, reactive to light, with normal accommodation [Optic Disc Abnormality] : the optic disc were normal in size and color [Full Visual Field] : full visual field [Outer Ear] : the ears and nose were normal in appearance [Hearing Threshold Finger Rub Not [place]] : hearing was normal [Neck Appearance] : the appearance of the neck was normal [Neck Cervical Mass (___cm)] : no neck mass was observed [Thyroid Diffuse Enlargement] : the thyroid was not enlarged [] : no respiratory distress [Exaggerated Use Of Accessory Muscles For Inspiration] : no accessory muscle use [Edema] : there was no peripheral edema [Involuntary Movements] : no involuntary movements were seen [Skin Color & Pigmentation] : normal skin color and pigmentation [MocaTotal] : 21 21 [Repeating Phrases] : no difficulty repeating a phrase [Tremor] : a tremor present [Short Term Intact] : short term memory impaired [Paresis Pronator Drift Right-Sided] : no pronator drift on the right [Paresis Pronator Drift Left-Sided] : no pronator drift on the left [Motor Strength Upper Extremities Bilaterally] : strength was normal in both upper extremities [Motor Strength Lower Extremities Bilaterally] : strength was normal in both lower extremities [Romberg's Sign] : Romberg's sign was negtive [Dysdiadochokinesia Bilaterally] : not present [Coordination - Dysmetria Impaired Finger-to-Nose Bilateral] : not present [Coordination - Dysmetria Impaired Heel-to-Shin Bilateral] : not present [Glabellar Reflex] : the glabellar reflex was absent [FreeTextEntry4] : MOCA results below [FreeTextEntry6] : has some slight postural tremor b/l. no bushra cogwheeling/ rigidity of UE/LE. Has some stooped posture, able to get up from seated independently and ambulate without falling towards one side. [FreeTextEntry8] : slightly stooped gait

## 2024-05-07 NOTE — DISCUSSION/SUMMARY
[FreeTextEntry1] : 48y M with Hx memory loss (>1 year), abnormal MOCA 21 (2/24) > 18 (5/24), cerebellar/brainstem atrophy appreciated on MRI. Can be neurodegenerative/ movement disorder. LP unrevealing. Not suggestive of encephalitis.   Recs: [] c/w Zoloft   [] RTC in 4-6 months with our movement disorders clinic  Patient care discussed with neuro attending Dr Perkins and in agreement with above. Discussed above in detail with patient and in agreement. Offered opportunity for questions and all answered. Risk factor modifications including but not limited to dietary, smoking, HTN/HLD/DM control also discussed. Also advised if new/worsening neurologic symptoms to return to ED. Note not finalized until signed by attending.

## 2024-05-08 DIAGNOSIS — R63.4 ABNORMAL WEIGHT LOSS: ICD-10-CM

## 2024-05-08 DIAGNOSIS — Z80.0 FAMILY HISTORY OF MALIGNANT NEOPLASM OF DIGESTIVE ORGANS: ICD-10-CM

## 2024-05-23 RX ORDER — SERTRALINE HYDROCHLORIDE 50 MG/1
50 TABLET, FILM COATED ORAL DAILY
Qty: 60 | Refills: 1 | Status: ACTIVE | COMMUNITY
Start: 2023-12-19 | End: 1900-01-01

## 2024-05-30 ENCOUNTER — TRANSCRIPTION ENCOUNTER (OUTPATIENT)
Age: 49
End: 2024-05-30

## 2024-05-30 ENCOUNTER — OUTPATIENT (OUTPATIENT)
Dept: OUTPATIENT SERVICES | Facility: HOSPITAL | Age: 49
LOS: 1 days | End: 2024-05-30
Payer: SELF-PAY

## 2024-05-30 VITALS
WEIGHT: 179.02 LBS | DIASTOLIC BLOOD PRESSURE: 67 MMHG | RESPIRATION RATE: 19 BRPM | OXYGEN SATURATION: 100 % | HEIGHT: 66 IN | HEART RATE: 85 BPM | SYSTOLIC BLOOD PRESSURE: 136 MMHG | TEMPERATURE: 97 F

## 2024-05-30 VITALS
SYSTOLIC BLOOD PRESSURE: 114 MMHG | HEART RATE: 96 BPM | OXYGEN SATURATION: 100 % | RESPIRATION RATE: 17 BRPM | DIASTOLIC BLOOD PRESSURE: 60 MMHG

## 2024-05-30 DIAGNOSIS — R63.4 ABNORMAL WEIGHT LOSS: ICD-10-CM

## 2024-05-30 LAB — GLUCOSE BLDC GLUCOMTR-MCNC: 179 MG/DL — HIGH (ref 70–99)

## 2024-05-30 PROCEDURE — 45378 DIAGNOSTIC COLONOSCOPY: CPT

## 2024-05-30 PROCEDURE — 43235 EGD DIAGNOSTIC BRUSH WASH: CPT

## 2024-05-30 PROCEDURE — 82962 GLUCOSE BLOOD TEST: CPT

## 2024-05-30 PROCEDURE — 43235 EGD DIAGNOSTIC BRUSH WASH: CPT | Mod: GC

## 2024-05-30 PROCEDURE — 45378 DIAGNOSTIC COLONOSCOPY: CPT | Mod: GC

## 2024-05-30 RX ORDER — INSULIN LISPRO 100/ML
6 VIAL (ML) SUBCUTANEOUS
Qty: 0 | Refills: 0 | DISCHARGE

## 2024-05-30 RX ORDER — SODIUM CHLORIDE 9 MG/ML
500 INJECTION INTRAMUSCULAR; INTRAVENOUS; SUBCUTANEOUS
Refills: 0 | Status: DISCONTINUED | OUTPATIENT
Start: 2024-05-30 | End: 2024-06-13

## 2024-05-30 NOTE — PRE PROCEDURE NOTE - PRE PROCEDURE EVALUATION
Pre-Endoscopy Evaluation    Attending Physician:  Dr. Kishore Delong    Procedure: EGD + Colonoscopy    Indication for Procedure & Pertinent History: See [employer] chart    PAST MEDICAL & SURGICAL HISTORY:  Diabetes    No significant past surgical history    Allergies    No Known Allergies    Intolerances    Medications: MEDICATIONS  (STANDING):    MEDICATIONS  (PRN):    Pertinent lab data:    Comments:    ASA Class: I []  II [X]  III []  IV []    The patient is a suitable candidate for the planned procedure unless box checked [ ]  No, explain:

## 2024-05-30 NOTE — ASU PATIENT PROFILE, ADULT - FALL HARM RISK - UNIVERSAL INTERVENTIONS
Bed in lowest position, wheels locked, appropriate side rails in place/Call bell, personal items and telephone in reach/Instruct patient to call for assistance before getting out of bed or chair/Non-slip footwear when patient is out of bed/[place] to call system/Physically safe environment - no spills, clutter or unnecessary equipment/Purposeful Proactive Rounding/Room/bathroom lighting operational, light cord in reach

## 2024-05-30 NOTE — ASU PREOP CHECKLIST - AS TEMP SITE
Spoke with patient, she will upload readings today or drop them off when she comes in for her OB check this afternoon. No additional questions or concerns.    forehead

## 2024-05-30 NOTE — ASU DISCHARGE PLAN (ADULT/PEDIATRIC) - NS MD DC FALL RISK RISK
For information on Fall & Injury Prevention, visit: [email or web address] OR  [email or web address] OR  https://www.cdc.gov/steadi/patient.html

## 2024-05-30 NOTE — ASU PATIENT PROFILE, ADULT - NSICDXPASTMEDICALHX_GEN_ALL_CORE_FT
Pt arrived via  [facility] for psych eval. Per EMS and Police, pt has a hx of schizophrenia and bipolar disorder and has not been taking his meds for past week or so. Pt mother called police because he was being aggressive with her and his girlfriend. When police arrived on scene, pt had girlfriend pinned to the ground by the throat. Pt was last inpatient psych in October at [place].    PAST MEDICAL HISTORY:  Diabetes type 1

## 2024-05-31 ENCOUNTER — RESULT REVIEW (OUTPATIENT)
Age: 49
End: 2024-05-31

## 2024-06-04 PROBLEM — E11.9 TYPE 2 DIABETES MELLITUS WITHOUT COMPLICATIONS: Chronic | Status: ACTIVE | Noted: 2018-03-28

## 2024-06-05 ENCOUNTER — OUTPATIENT (OUTPATIENT)
Dept: OUTPATIENT SERVICES | Facility: HOSPITAL | Age: 49
LOS: 1 days | End: 2024-06-05
Payer: COMMERCIAL

## 2024-06-05 ENCOUNTER — APPOINTMENT (OUTPATIENT)
Dept: CT IMAGING | Facility: CLINIC | Age: 49
End: 2024-06-05
Payer: COMMERCIAL

## 2024-06-05 DIAGNOSIS — R63.4 ABNORMAL WEIGHT LOSS: ICD-10-CM

## 2024-06-05 PROCEDURE — 74177 CT ABD & PELVIS W/CONTRAST: CPT

## 2024-06-05 PROCEDURE — 71260 CT THORAX DX C+: CPT | Mod: 26

## 2024-06-05 PROCEDURE — 74177 CT ABD & PELVIS W/CONTRAST: CPT | Mod: 26

## 2024-06-05 PROCEDURE — 71260 CT THORAX DX C+: CPT

## 2024-06-21 DIAGNOSIS — I10 ESSENTIAL (PRIMARY) HYPERTENSION: ICD-10-CM

## 2024-06-24 ENCOUNTER — NON-APPOINTMENT (OUTPATIENT)
Age: 49
End: 2024-06-24

## 2024-07-18 ENCOUNTER — APPOINTMENT (OUTPATIENT)
Dept: OPHTHALMOLOGY | Facility: CLINIC | Age: 49
End: 2024-07-18
Payer: SELF-PAY

## 2024-07-18 ENCOUNTER — NON-APPOINTMENT (OUTPATIENT)
Age: 49
End: 2024-07-18

## 2024-07-18 PROCEDURE — 92012 INTRM OPH EXAM EST PATIENT: CPT

## 2024-08-01 ENCOUNTER — APPOINTMENT (OUTPATIENT)
Dept: ENDOCRINOLOGY | Facility: HOSPITAL | Age: 49
End: 2024-08-01
Payer: COMMERCIAL

## 2024-08-01 ENCOUNTER — OUTPATIENT (OUTPATIENT)
Dept: OUTPATIENT SERVICES | Facility: HOSPITAL | Age: 49
LOS: 1 days | End: 2024-08-01
Payer: SELF-PAY

## 2024-08-01 VITALS
BODY MASS INDEX: 31.16 KG/M2 | HEART RATE: 94 BPM | WEIGHT: 187 LBS | HEIGHT: 65 IN | DIASTOLIC BLOOD PRESSURE: 72 MMHG | SYSTOLIC BLOOD PRESSURE: 107 MMHG | RESPIRATION RATE: 16 BRPM | TEMPERATURE: 96.7 F | OXYGEN SATURATION: 98 %

## 2024-08-01 DIAGNOSIS — E03.9 HYPOTHYROIDISM, UNSPECIFIED: ICD-10-CM

## 2024-08-01 DIAGNOSIS — E06.9 THYROIDITIS, UNSPECIFIED: ICD-10-CM

## 2024-08-01 PROCEDURE — ZZZZZ: CPT | Mod: GC

## 2024-08-01 PROCEDURE — G0463: CPT

## 2024-08-06 NOTE — ASSESSMENT
[FreeTextEntry1] : 48 year old man with uncontrolled DM1, hypothyroidism, and HLD  1. DM1, uncontrolled and HLD a1c: 9.4% (4/2024) -> 9.4% (8/2024) - current medications basaglar 22 units qhs, humalog 6/6/8 before breakfast/lunch/dinner - SMBG: freestyle roel 14 day - lows overnight and hyperglycemic during the day  - statin: atorvastatin 20mg daily - ACE/ARB: not on ace/arb, /72 PLAN - decrease basaglar to 18 units at bedtime given am hypoglycemia - increase humalog to 8/8/10 given postprandial hyperglycemia  - if small meal can do 4-5 units instead - continue smbg with roel 14 day, sensors refilled - should decrease basaglar to 16 if BG <90 in am x 1 week - should increase premeal humalog to 10 if postprandial glucose >200 x 1 week - c/w metformin 500mg daily for insulin sensitivity - lifestyle modifications encouraged - routine opthalmology and podiatry follow up  2. Hypothyroidism: - Clinically euthyroid and biochemically euthyroid - Continue LT4 150 mcg daily, discussed taking on an empty stomach  RTC in 3 months  Discussed case w/ Dr. Jace Rodriguez MD Endocrinology Fellow - PGY-5

## 2024-08-06 NOTE — HISTORY OF PRESENT ILLNESS
[FreeTextEntry1] : 48 year old man with history of uncontrolled DM1, hypothyroidism, history of non-traumatic fracture of right tibia/fibula presents for follow up of DM1.  No interval updates or hospitalizations. Feeling well. Has made improvements in diet.  DM type 1: Patient was diagnosed with DM1 at age 8. A1c 8.8% in Feb 2022, up to 9.2% in June 2022, 9.1% in Dec 2022,9.7% March 2023. a1c: 9.4% (4/2024) -> 9.4% (8/2024)  Plan at last visit: - decrease basaglar to 22 units given am hypoglycemia - increase humalog to 6 units tid   Current medications:  basaglar 22 units qhs,  humalog 6/6/8 before breakfast/lunch/dinner   - Compliance:  - SMBG: freestyle roel 14 day  TIR 20% Above 67% Below 13% -  Low glucose levels mainly overnight 2x in the past month  - Diet Breakfast 8am: coffee with milk, cereal Lunch 1pm: bread with turkey Dinner 6pm: "anything", biggest meal No snacks. 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 No macrovascular complications.  - Follows with podiatrist. No foot ulceration. Last visit in March 2023. Reports needs an appointment - statin: atorvastatin 20mg daily - not sure if he is taking - ACE/ARB: not on ace/arb, /72 - MACR wnl in 4/2024 -  in 4/2024 - LDL 54 in 4/2024  Hypothyroidism:  - diagnosed in 20s - no hx of thyroid surgery/radiation - father with  hypothyroidism  4/2024: TSH 0.71, FT4 1.7 - taking 150 mcg LT4 in the morning reports takes it without food. Denies palpitations, tremors, loose stools, constipation, heat or cold intolerance, unexplained weight changes, neck pain, dysphagia, dysphonia, skin changes, hair loss - HR 94  #Fracture History History of right tibial and fibula fracture after fall: s/p surgery with ortho in March 2018. BMD July 2018 normal: L spine -0.9, left femoral neck -0.9, left total hip -1.1. Work up for fracture negative - vitamin D levels normal, calcium normal, TFTs normal; testosterone noted to be mildly low previously with normal LH and FSH, however testosterone was not drawn in the AM. Patient with normal libido and energy levels at this time.   - [id number] 38.1 in 4/2024  Todays visit: - does not currently take vitamin D, denies interval falls or fractures - Does not take a calcium supplement, gets calcium from diet - currently reports no change in libido, denies fatigue

## 2024-08-06 NOTE — HISTORY OF PRESENT ILLNESS
[FreeTextEntry1] : 48 year old man with history of uncontrolled DM1, hypothyroidism, history of non-traumatic fracture of right tibia/fibula presents for follow up of DM1.  No interval updates or hospitalizations. Feeling well. Has made improvements in diet.  DM type 1: Patient was diagnosed with DM1 at age 8. A1c 8.8% in Feb 2022, up to 9.2% in June 2022, 9.1% in Dec 2022,9.7% March 2023. a1c: 9.4% (4/2024) -> 9.4% (8/2024)  Plan at last visit: - decrease basaglar to 22 units given am hypoglycemia - increase humalog to 6 units tid   Current medications:  basaglar 22 units qhs,  humalog 6/6/8 before breakfast/lunch/dinner   - Compliance:  - SMBG: freestyle roel 14 day  TIR 20% Above 67% Below 13% -  Low glucose levels mainly overnight 2x in the past month  - Diet Breakfast 8am: coffee with milk, cereal Lunch 1pm: bread with turkey Dinner 6pm: "anything", biggest meal No snacks. 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 No macrovascular complications.  - Follows with podiatrist. No foot ulceration. Last visit in March 2023. Reports needs an appointment - statin: atorvastatin 20mg daily - not sure if he is taking - ACE/ARB: not on ace/arb, /72 - MACR wnl in 4/2024 -  in 4/2024 - LDL 54 in 4/2024  Hypothyroidism:  - diagnosed in 20s - no hx of thyroid surgery/radiation - father with  hypothyroidism  4/2024: TSH 0.71, FT4 1.7 - taking 150 mcg LT4 in the morning reports takes it without food. Denies palpitations, tremors, loose stools, constipation, heat or cold intolerance, unexplained weight changes, neck pain, dysphagia, dysphonia, skin changes, hair loss - HR 94  #Fracture History History of right tibial and fibula fracture after fall: s/p surgery with ortho in March 2018. BMD July 2018 normal: L spine -0.9, left femoral neck -0.9, left total hip -1.1. Work up for fracture negative - vitamin D levels normal, calcium normal, TFTs normal; testosterone noted to be mildly low previously with normal LH and FSH, however testosterone was not drawn in the AM. Patient with normal libido and energy levels at this time.   - [id number] 38.1 in 4/2024  Todays visit: - does not currently take vitamin D, denies interval falls or fractures - Does not take a calcium supplement, gets calcium from diet - currently reports no change in libido, denies fatigue

## 2024-08-08 DIAGNOSIS — E10.39 TYPE 1 DIABETES MELLITUS WITH OTHER DIABETIC OPHTHALMIC COMPLICATION: ICD-10-CM

## 2024-08-08 DIAGNOSIS — E03.9 HYPOTHYROIDISM, UNSPECIFIED: ICD-10-CM

## 2024-08-09 LAB — HBA1C MFR BLD HPLC: 9.4

## 2024-08-29 ENCOUNTER — OUTPATIENT (OUTPATIENT)
Dept: OUTPATIENT SERVICES | Facility: HOSPITAL | Age: 49
LOS: 1 days | End: 2024-08-29
Payer: SELF-PAY

## 2024-08-29 ENCOUNTER — APPOINTMENT (OUTPATIENT)
Dept: NEUROLOGY | Facility: HOSPITAL | Age: 49
End: 2024-08-29

## 2024-08-29 VITALS
HEART RATE: 94 BPM | TEMPERATURE: 97.3 F | OXYGEN SATURATION: 97 % | WEIGHT: 187 LBS | BODY MASS INDEX: 31.12 KG/M2 | SYSTOLIC BLOOD PRESSURE: 113 MMHG | RESPIRATION RATE: 19 BRPM | DIASTOLIC BLOOD PRESSURE: 75 MMHG

## 2024-08-29 DIAGNOSIS — F32.A DEPRESSION, UNSPECIFIED: ICD-10-CM

## 2024-08-29 DIAGNOSIS — R41.3 OTHER AMNESIA: ICD-10-CM

## 2024-08-29 DIAGNOSIS — R56.9 UNSPECIFIED CONVULSIONS: ICD-10-CM

## 2024-08-29 PROCEDURE — G0463: CPT

## 2024-08-29 RX ORDER — SERTRALINE 25 MG/1
25 TABLET, FILM COATED ORAL DAILY
Qty: 30 | Refills: 3 | Status: ACTIVE | COMMUNITY
Start: 2024-08-29 | End: 1900-01-01

## 2024-08-30 DIAGNOSIS — F32.A DEPRESSION, UNSPECIFIED: ICD-10-CM

## 2024-08-30 DIAGNOSIS — R41.3 OTHER AMNESIA: ICD-10-CM

## 2024-08-30 NOTE — PHYSICAL EXAM
[General Appearance - Alert] : alert [General Appearance - In No Acute Distress] : in no acute distress [General Appearance - Well Nourished] : well nourished [General Appearance - Well Developed] : well developed [General Appearance - Well-Appearing] : healthy appearing [Oriented To Time, Place, And Person] : oriented to person, place, and time [Impaired Insight] : insight and judgment were intact [Affect] : the affect was normal [Mood] : the mood was normal [Memory Remote] : remote memory was not impaired [Person] : oriented to person [Place] : oriented to place [Time] : oriented to time [Remote Intact] : remote memory intact [Registration Intact] : recent registration memory intact [Span Intact] : the attention span was normal [Concentration Intact] : normal concentrating ability [Naming Objects] : no difficulty naming common objects [Repeating Phrases] : no difficulty repeating a phrase [Fluency] : fluency intact [Comprehension] : comprehension intact [Vocabulary] : adequate range of vocabulary [Cranial Nerves Optic (II)] : visual acuity intact bilaterally,  visual fields full to confrontation, pupils equal round and reactive to light [Cranial Nerves Oculomotor (III)] : extraocular motion intact [Cranial Nerves Trigeminal (V)] : facial sensation intact symmetrically [Cranial Nerves Facial (VII)] : face symmetrical [Cranial Nerves Vestibulocochlear (VIII)] : hearing was intact bilaterally [Cranial Nerves Glossopharyngeal (IX)] : tongue and palate midline [Cranial Nerves Accessory (XI - Cranial And Spinal)] : head turning and shoulder shrug symmetric [Cranial Nerves Hypoglossal (XII)] : there was no tongue deviation with protrusion [Motor Tone] : muscle tone was normal in all four extremities [Motor Strength] : muscle strength was normal in all four extremities [Sensation Tactile Decrease] : light touch was intact [Tremor] : a tremor present [Balance] : balance was intact [2+] : Brachioradialis left 2+ [Sclera] : the sclera and conjunctiva were normal [PERRL With Normal Accommodation] : pupils were equal in size, round, reactive to light, with normal accommodation [Optic Disc Abnormality] : the optic disc were normal in size and color [Full Visual Field] : full visual field [Outer Ear] : the ears and nose were normal in appearance [Hearing Threshold Finger Rub Not [place]] : hearing was normal [Neck Appearance] : the appearance of the neck was normal [Neck Cervical Mass (___cm)] : no neck mass was observed [Thyroid Diffuse Enlargement] : the thyroid was not enlarged [] : no respiratory distress [Exaggerated Use Of Accessory Muscles For Inspiration] : no accessory muscle use [Edema] : there was no peripheral edema [Involuntary Movements] : no involuntary movements were seen [Skin Color & Pigmentation] : normal skin color and pigmentation [___ / 5] : Visuospatial / Executive: [unfilled] / 5 [0 / 0] : Memory: 0 / 0 [___ / 3] : Attention (Serial 7 subtraction): [unfilled] / 3 [___ / 1] : Fluency: [unfilled] / 1 [___ / 2] : Abstraction: [unfilled] / 2 [___ / 5] : Delayed Recall: [unfilled] / 5 [___ / 6] : Orientation: [unfilled] / 6 [1+] : Biceps left 1+ [0] : Ankle jerk left 0 [Short Term Intact] : short term memory impaired [Paresis Pronator Drift Right-Sided] : no pronator drift on the right [Paresis Pronator Drift Left-Sided] : no pronator drift on the left [Motor Strength Upper Extremities Bilaterally] : strength was normal in both upper extremities [Motor Strength Lower Extremities Bilaterally] : strength was normal in both lower extremities [Romberg's Sign] : Romberg's sign was negtive [Dysdiadochokinesia Bilaterally] : not present [Coordination - Dysmetria Impaired Heel-to-Shin Bilateral] : not present [Coordination - Dysmetria Impaired Finger-to-Nose Bilateral] : not present [___] : absent on the right [___] : absent on the left [Glabellar Reflex] : the glabellar reflex was absent [FreeTextEntry4] : MOCA 18 [MocaTotal] : 18 [FreeTextEntry6] : has some slight postural tremor b/l. mild symmetric cogwheeling and paratonia in UE/LE. Has some stooped posture, amble to get up from seated independently and ambulate without falling towards one side. [FreeTextEntry8] : slightly stooped gait, pull test negative

## 2024-08-30 NOTE — PHYSICAL EXAM
[General Appearance - Alert] : alert [General Appearance - In No Acute Distress] : in no acute distress [General Appearance - Well Nourished] : well nourished [General Appearance - Well Developed] : well developed [General Appearance - Well-Appearing] : healthy appearing [Oriented To Time, Place, And Person] : oriented to person, place, and time [Impaired Insight] : insight and judgment were intact [Affect] : the affect was normal [Mood] : the mood was normal [Memory Remote] : remote memory was not impaired [Person] : oriented to person [Place] : oriented to place [Time] : oriented to time [Remote Intact] : remote memory intact [Registration Intact] : recent registration memory intact [Span Intact] : the attention span was normal [Concentration Intact] : normal concentrating ability [Naming Objects] : no difficulty naming common objects [Repeating Phrases] : no difficulty repeating a phrase [Fluency] : fluency intact [Comprehension] : comprehension intact [Vocabulary] : adequate range of vocabulary [Cranial Nerves Optic (II)] : visual acuity intact bilaterally,  visual fields full to confrontation, pupils equal round and reactive to light [Cranial Nerves Oculomotor (III)] : extraocular motion intact [Cranial Nerves Trigeminal (V)] : facial sensation intact symmetrically [Cranial Nerves Facial (VII)] : face symmetrical [Cranial Nerves Vestibulocochlear (VIII)] : hearing was intact bilaterally [Cranial Nerves Glossopharyngeal (IX)] : tongue and palate midline [Cranial Nerves Accessory (XI - Cranial And Spinal)] : head turning and shoulder shrug symmetric [Cranial Nerves Hypoglossal (XII)] : there was no tongue deviation with protrusion [Motor Tone] : muscle tone was normal in all four extremities [Motor Strength] : muscle strength was normal in all four extremities [Sensation Tactile Decrease] : light touch was intact [Tremor] : a tremor present [Balance] : balance was intact [2+] : Brachioradialis left 2+ [Sclera] : the sclera and conjunctiva were normal [PERRL With Normal Accommodation] : pupils were equal in size, round, reactive to light, with normal accommodation [Optic Disc Abnormality] : the optic disc were normal in size and color [Full Visual Field] : full visual field [Outer Ear] : the ears and nose were normal in appearance [Hearing Threshold Finger Rub Not [place]] : hearing was normal [Neck Appearance] : the appearance of the neck was normal [Neck Cervical Mass (___cm)] : no neck mass was observed [Thyroid Diffuse Enlargement] : the thyroid was not enlarged [] : no respiratory distress [Exaggerated Use Of Accessory Muscles For Inspiration] : no accessory muscle use [Edema] : there was no peripheral edema [Involuntary Movements] : no involuntary movements were seen [Skin Color & Pigmentation] : normal skin color and pigmentation [___ / 5] : Visuospatial / Executive: [unfilled] / 5 [0 / 0] : Memory: 0 / 0 [___ / 3] : Attention (Serial 7 subtraction): [unfilled] / 3 [___ / 1] : Fluency: [unfilled] / 1 [___ / 2] : Abstraction: [unfilled] / 2 [___ / 5] : Delayed Recall: [unfilled] / 5 [___ / 6] : Orientation: [unfilled] / 6 [1+] : Biceps left 1+ [0] : Ankle jerk left 0 [Short Term Intact] : short term memory impaired [Paresis Pronator Drift Right-Sided] : no pronator drift on the right [Paresis Pronator Drift Left-Sided] : no pronator drift on the left [Motor Strength Upper Extremities Bilaterally] : strength was normal in both upper extremities [Motor Strength Lower Extremities Bilaterally] : strength was normal in both lower extremities [Romberg's Sign] : Romberg's sign was negtive [Dysdiadochokinesia Bilaterally] : not present [Coordination - Dysmetria Impaired Finger-to-Nose Bilateral] : not present [Coordination - Dysmetria Impaired Heel-to-Shin Bilateral] : not present [___] : absent on the right [___] : absent on the left [Glabellar Reflex] : the glabellar reflex was absent [FreeTextEntry4] : MOCA 18 [MocaTotal] : 18 [FreeTextEntry6] : has some slight postural tremor b/l. mild symmetric cogwheeling and paratonia in UE/LE. Has some stooped posture, amble to get up from seated independently and ambulate without falling towards one side. [FreeTextEntry8] : slightly stooped gait, pull test negative

## 2024-08-30 NOTE — DISCUSSION/SUMMARY
[FreeTextEntry1] : 48y M with Hx memory loss (>1 year), abnormal MOCA 21 (2/24) > 18 (5/24), cerebellar/brainstem and L>R temporal lobe atrophy appreciated on MRI. Can be neurodegenerative.? FTD LP unrevealing. Not suggestive of encephalitis.   Recs: Increase Zoloft to 75mg daily   PET scan and neuropsychiatric testing RTC in 3 months general clinic  Patient care discussed with neuro attending Dr Leary and in agreement with above. Discussed above in detail with patient and in agreement. Offered opportunity for questions and all answered. Risk factor modifications including but not limited to dietary, smoking, HTN/HLD/DM control also discussed. Also advised if new/worsening neurologic symptoms to return to ED. Note not finalized until signed by attending.

## 2024-08-30 NOTE — HISTORY OF PRESENT ILLNESS
[FreeTextEntry1] : Follow up visit 8/29/24 Since last visit patient following with GI for non-intentional weight loss planning for EGD/Colonoscopy. Malignancy work up w/ CT Chest notable for nonspecific GGO RUL and recommended for follow up CT Chest in 3 months. Since last visit patient reports worse short term memory. Sometimes forgetful of people and places. Able to perform most ADLs.  Does not drive. No issues with walking. Reports 50 pound weight loss over the past year. Was unable to perform colonoscopy d/t stool burden and failure to comply with bowel prep. Rescheduled for one year from now?  Still not working but looking for work.  Mood has worsened, as per father. Stated he gets angry at parents out of proportion to personality. Per father more depressed.  Sleep is good. No hallucinations. No falls No constipation, appetite is good. No tremors noted by father.   ***Follow up 5/7/24*** - Presents with mom Patient presents for follow up to neuro clinic. Memory issues same since last visit. He misplaces things like his DM devices, medications that his mm helps him with. Never forgets people, places, ADLs (brush teeth, shower, feed himself), ambulates though cautiously. When ambulating, he looks down. This has been stable since accident in 2018, feel and injury of RLE. Lives at home with mom. In the past was working, good with math. 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 Unsure if ability to do maths is impaired as he spends most of his time at home and doesn't want to leave home, as per mom.   Otherwise no fevers, chills, nausea, vomiting, speech changes, headaches, hearing changes, vision changes, extremity weakness/numbness/tingling, ambulatory changes.   Patient had LP 2/23/24: TNC <1, no color, clear RBC 0. PCR not detect. IgG 2.9, ratio 0.20, protein 23, glucose 140, OCB absent, AIE panel unrevealing.  Recent labs 4/2024: cmp wnl, Cr  zahida D, 25: 38.1, TSH 0.71, A1c 9.7, LDL 54, trig 41, chol 122,   12/19/23: B12 622, folate >20, syphilis neg MR head no con 1/4/24:  There is no evidence of acute infarct. There are no foci of susceptibility artifact to suggest hemorrhage. Areas of T2/FLAIR hyperintensity in the bilateral hemispheric white matter are nonspecific but likely related to chronic white matter microvascular ischemic disease. Significant atrophy of the cerebellum and brainstem. Flow voids of the major intracranial vessels at the skull base follow expected course and contour. The paranasal sinuses demonstrate no signal abnormality. The mastoids demonstrate no signal abnormality.  Bilateral orbits are within normal limits. IMPRESSION: No acute infarct, hemorrhage, or mass effect. Significant atrophy of the cerebellum and brainstem.  Initial encounter: 48y M with Hx DM1, hypothyroidism, depression, HLD, who presents as initial evaluation for memory loss. Issue has been ongoing for 1 year. He is not currently working. He is independent in ADLs. He states LTM is impaired, but LTM is intact. He is forgetful of other people, except for family members. Denies tremors, getting lost in familiar places. Mom notices that he walks slow and sometimes will fall in the AM. He is slow to find his words at times. No focal weakness, numbness, tingling, dizziness, headaches, or visual loss. He had normal development as child. No neurological disease in the family. He was referred here by PCP due to memory loss and abnormal MRI (cerebellar and brainstem atrophy). Normal folate, B12, RPR, CMP. He has been on Zoloft for past 2 months and has noticed some improvements, talking and more helpful around the house. Lives with his mother.

## 2024-09-20 ENCOUNTER — APPOINTMENT (OUTPATIENT)
Dept: NUCLEAR MEDICINE | Facility: IMAGING CENTER | Age: 49
End: 2024-09-20

## 2024-09-20 ENCOUNTER — RESULT REVIEW (OUTPATIENT)
Age: 49
End: 2024-09-20

## 2024-09-20 ENCOUNTER — OUTPATIENT (OUTPATIENT)
Dept: OUTPATIENT SERVICES | Facility: HOSPITAL | Age: 49
LOS: 1 days | End: 2024-09-20
Payer: SELF-PAY

## 2024-09-20 DIAGNOSIS — R41.3 OTHER AMNESIA: ICD-10-CM

## 2024-09-20 DIAGNOSIS — R63.4 ABNORMAL WEIGHT LOSS: ICD-10-CM

## 2024-09-20 PROCEDURE — 78999 UNLISTED MISC PX DX NUC MED: CPT | Mod: 26

## 2024-09-20 PROCEDURE — A9552: CPT

## 2024-09-20 PROCEDURE — 78999 UNLISTED MISC PX DX NUC MED: CPT

## 2024-09-20 PROCEDURE — 78608 BRAIN IMAGING (PET): CPT | Mod: 26

## 2024-09-20 PROCEDURE — 78608 BRAIN IMAGING (PET): CPT

## 2024-09-23 ENCOUNTER — APPOINTMENT (OUTPATIENT)
Dept: INTERNAL MEDICINE | Facility: CLINIC | Age: 49
End: 2024-09-23
Payer: COMMERCIAL

## 2024-09-23 ENCOUNTER — OUTPATIENT (OUTPATIENT)
Dept: OUTPATIENT SERVICES | Facility: HOSPITAL | Age: 49
LOS: 1 days | End: 2024-09-23
Payer: SELF-PAY

## 2024-09-23 VITALS
HEART RATE: 75 BPM | WEIGHT: 182 LBS | DIASTOLIC BLOOD PRESSURE: 70 MMHG | BODY MASS INDEX: 30.32 KG/M2 | SYSTOLIC BLOOD PRESSURE: 120 MMHG | OXYGEN SATURATION: 97 % | HEIGHT: 65 IN

## 2024-09-23 DIAGNOSIS — E03.9 HYPOTHYROIDISM, UNSPECIFIED: ICD-10-CM

## 2024-09-23 DIAGNOSIS — I10 ESSENTIAL (PRIMARY) HYPERTENSION: ICD-10-CM

## 2024-09-23 DIAGNOSIS — E10.39 TYPE 1 DIABETES MELLITUS WITH OTHER DIABETIC OPHTHALMIC COMPLICATION: ICD-10-CM

## 2024-09-23 DIAGNOSIS — F32.A DEPRESSION, UNSPECIFIED: ICD-10-CM

## 2024-09-23 PROCEDURE — 99214 OFFICE O/P EST MOD 30 MIN: CPT | Mod: GC

## 2024-09-23 PROCEDURE — 86255 FLUORESCENT ANTIBODY SCREEN: CPT

## 2024-09-23 PROCEDURE — G0463: CPT

## 2024-09-23 RX ORDER — SERTRALINE HYDROCHLORIDE 50 MG/1
50 TABLET, FILM COATED ORAL DAILY
Qty: 60 | Refills: 1 | Status: ACTIVE | COMMUNITY
Start: 2024-09-23 | End: 1900-01-01

## 2024-09-25 NOTE — END OF VISIT
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 the patient got lost during our conversation because he forgot what we were talking about. pending PET scan. f/u with neuro. on physical exam, noticed significant temporal wasting. per patient, this is new.  one potential diagnosis is a prion disease with acute cognitive change with atrophy of brain.

## 2024-09-25 NOTE — HISTORY OF PRESENT ILLNESS
[de-identified] : Mr. Sepulveda is a 49 yo M with hx of DM1, hypothyroidism, and enuresis presenting for follow up for depression and trouble with memory. Since our last visit, the patient was able to see neurology. Unclear diagnosis thus far, notably MRI showed significant atrophy of the cerebellum and brainstem of unclear etiology. Subsequent lumbar puncture revealed unremarkable CSF, notably no oligoclonal bands. Following with neurology who recently did a PET scan and rec following with movement disorder/degenerative clinic.   Patient says he feels okay, still having much trouble remembering.

## 2024-09-25 NOTE — ASSESSMENT
[FreeTextEntry1] : Mr. Sepulveda is a 49 yo M with pmh of DM1, hypothyroidism, and enuresis presenting for follow up for memory loss, depressed mood and poor functional status. Neuro workup pending, MRI with notable changes and CSF unremarkable, pet scan pending. Will try to expedite neuro follow up.   #depressed mood - May be related to unspecified neurodegenerative disease, less likely behavioral - inc sertraline to 75mg daily  #Subacute memory loss - Folate, B12, RPR, and CMP all WNL - MRI with cerebellar and brain stem atrophy, unclear significance - CSF unremarkable negative for oligoclonal bands - Neuro workup pending including autoimmune encephalopathy panel, Lyme, WNV, MBP, LDH - Differential unclear at this time, wonder if Creutzfeldt Fredo should be considered   - Check NMDA  - F/u in three months  #Health Maintenance - CPE due in October   Case d/w Dr. Laurie Quinn, PGY-2

## 2024-09-26 ENCOUNTER — APPOINTMENT (OUTPATIENT)
Dept: NEUROLOGY | Facility: HOSPITAL | Age: 49
End: 2024-09-26

## 2024-09-26 ENCOUNTER — OUTPATIENT (OUTPATIENT)
Dept: OUTPATIENT SERVICES | Facility: HOSPITAL | Age: 49
LOS: 1 days | End: 2024-09-26
Payer: SELF-PAY

## 2024-09-26 VITALS
BODY MASS INDEX: 30.66 KG/M2 | HEART RATE: 76 BPM | SYSTOLIC BLOOD PRESSURE: 134 MMHG | TEMPERATURE: 98 F | OXYGEN SATURATION: 99 % | WEIGHT: 184 LBS | HEIGHT: 65 IN | RESPIRATION RATE: 18 BRPM | DIASTOLIC BLOOD PRESSURE: 81 MMHG

## 2024-09-26 DIAGNOSIS — R56.9 UNSPECIFIED CONVULSIONS: ICD-10-CM

## 2024-09-26 DIAGNOSIS — R41.3 OTHER AMNESIA: ICD-10-CM

## 2024-09-26 PROCEDURE — G0463: CPT

## 2024-09-26 NOTE — END OF VISIT
[] : Resident [FreeTextEntry3] : [id number] man here for memory problem basically, he started having memory problems for at least 1.5 years. mostly with recall her MOCA testing MRI showed atrophy at cerebellum and brainstem. PET parenchyma volume loss advnaced for age, pronounced in mesial temporal lobes bilateally, hypometabolism in cerebellum and brainstem, posterior cingulate gyri, parieto-occipital and temporal. DDx neurodegenrative disease vs autoimmune will send ENS2, paraneoplastic neuropsy eval will discuss with dementia colleague.  pt to return after testing

## 2024-09-26 NOTE — DISCUSSION/SUMMARY
[FreeTextEntry1] : 48y M with Hx DM1, hypothyroidism, HLD, brain atrophy, who presents for memory loss follow-up. No obvious risk factors for memory loss at such a young age. Need to continue ruling-out reversible causes of memory loss; will send for further testing.  Recommendations: [] serum autoimmune encephalitis and paraneoplastic panels [] neuropsychiatric testing [] 24h EEG [] referral to memory specialist: Dr Gutiérrez or Carolee [] RTC in 3 months  Case discussed w/ attending Dr Cox

## 2024-09-26 NOTE — HISTORY OF PRESENT ILLNESS
[FreeTextEntry1] : *** 9/26/24 *** Patient presents for memory loss follow-up, accompanied by father. He reports cognition is about the same as compared to last month's visit. He is forgetful about meals, but later remembers that he had them. Sleeping 6h per night. No hallucinations. Mood is ok. 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 Still unemployed. No headaches, difficulty walking, focal numbness or weakness. Did not yet go for neuropsychiatric testing. PET scan of brain showed hypometabolism in cerebellum, brainstem, temporal lobe, parieto-occipital lobe, and posterior cingulate gyrus. Lives w/ his parents. No family history of neurological disease. No personal history of concussion or TBI.  Follow up visit 8/29/24 Since last visit patient following with GI for non-intentional weight loss planning for EGD/Colonoscopy. Malignancy work up w/ CT Chest notable for nonspecific GGO RUL and recommended for follow up CT Chest in 3 months. Since last visit patient reports worse short term memory. Sometimes forgetful of people and places. Able to perform most ADLs. Does not drive. No issues with walking. Reports 50 pound weight loss over the past year. Was unable to perform colonoscopy d/t stool burden and failure to comply with bowel prep. Rescheduled for one year from now? Still not working but looking for work. Mood has worsened, as per father. Stated he gets angry at parents out of proportion to personality. Per father more depressed. Sleep is good. No hallucinations. No falls No constipation, appetite is good. No tremors noted by father.  ***Follow up 5/7/24*** - Presents with mom Patient presents for follow up to neuro clinic. Memory issues same since last visit. He misplaces things like his DM devices, medications that his mm helps him with. Never forgets people, places, ADLs (brush teeth, shower, feed himself), ambulates though cautiously. When ambulating, he looks down. This has been stable since accident in 2018, feel and injury of RLE. Lives at home with mom. In the past was working, good with math. 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 Unsure if ability to do maths is impaired as he spends most of his time at home and doesn't want to leave home, as per mom.  Otherwise no fevers, chills, nausea, vomiting, speech changes, headaches, hearing changes, vision changes, extremity weakness/numbness/tingling, ambulatory changes.  Patient had LP 2/23/24: TNC <1, no color, clear RBC 0. PCR not detect. IgG 2.9, ratio 0.20, protein 23, glucose 140, OCB absent, AIE panel unrevealing.  Recent labs 4/2024: cmp wnl, Cr zahida D, 25: 38.1, TSH 0.71, A1c 9.7, LDL 54, trig 41, chol 122,  12/19/23: B12 622, folate >20, syphilis neg MR head no con 1/4/24: There is no evidence of acute infarct. There are no foci of susceptibility artifact to suggest hemorrhage. Areas of T2/FLAIR hyperintensity in the bilateral hemispheric white matter are nonspecific but likely related to chronic white matter microvascular ischemic disease. Significant atrophy of the cerebellum and brainstem. Flow voids of the major intracranial vessels at the skull base follow expected course and contour. The paranasal sinuses demonstrate no signal abnormality. The mastoids demonstrate no signal abnormality. Bilateral orbits are within normal limits. IMPRESSION: No acute infarct, hemorrhage, or mass effect. Significant atrophy of the cerebellum and brainstem.  Initial encounter: 48y M with Hx DM1, hypothyroidism, depression, HLD, who presents as initial evaluation for memory loss. Issue has been ongoing for 1 year. He is not currently working. He is independent in ADLs. He states LTM is impaired, but LTM is intact. He is forgetful of other people, except for family members. Denies tremors, getting lost in familiar places. Mom notices that he walks slow and sometimes will fall in the AM. He is slow to find his words at times. No focal weakness, numbness, tingling, dizziness, headaches, or visual loss. He had normal development as child. No neurological disease in the family. He was referred here by PCP due to memory loss and abnormal MRI (cerebellar and brainstem atrophy). Normal folate, B12, RPR, CMP. He has been on Zoloft for past 2 months and has noticed some improvements, talking and more helpful around the house. Lives with his mother.

## 2024-09-26 NOTE — REVIEW OF SYSTEMS
[Confused or Disoriented] : confusion [Memory Lapses or Loss] : memory loss [Facial Weakness] : no facial weakness [Arm Weakness] : no arm weakness [Hand Weakness] : no hand weakness [Leg Weakness] : no leg weakness [Numbness] : no numbness [Cluster Headache] : no cluster headache [Migraine Headache] : no migraine headache [Tension Headache] : no tension-type headache [Difficulty Walking] : no difficulty walking

## 2024-09-26 NOTE — PHYSICAL EXAM
[General Appearance - Alert] : alert [General Appearance - In No Acute Distress] : in no acute distress [General Appearance - Well Nourished] : well nourished [General Appearance - Well Developed] : well developed [General Appearance - Well-Appearing] : healthy appearing [] : normal voice and communication [Oriented To Time, Place, And Person] : oriented to person, place, and time [Impaired Insight] : insight and judgment were intact [Affect] : the affect was normal [Mood] : the mood was normal [Memory Recent] : recent memory was not impaired [Memory Remote] : remote memory was not impaired [Person] : oriented to person [Place] : oriented to place [Time] : oriented to time [Remote Intact] : remote memory intact [Registration Intact] : recent registration memory intact [Span Intact] : the attention span was normal [Concentration Intact] : normal concentrating ability [Visual Intact] : visual attention was ~T not ~L decreased [Naming Objects] : no difficulty naming common objects [Repeating Phrases] : no difficulty repeating a phrase [Fluency] : fluency intact [Comprehension] : comprehension intact [Vocabulary] : adequate range of vocabulary [___ / 5] : Visuospatial / Executive: [unfilled] / 5 [___ / 3] : Attention (Serial 7 subtraction): [unfilled] / 3 [___ / 1] : Fluency: [unfilled] / 1 [___ / 2] : Abstraction: [unfilled] / 2 [___ / 5] : Delayed Recall: [unfilled] / 5 [___ / 6] : Orientation: [unfilled] / 6 [Cranial Nerves Optic (II)] : visual acuity intact bilaterally,  visual fields full to confrontation, pupils equal round and reactive to light [Cranial Nerves Oculomotor (III)] : extraocular motion intact [Cranial Nerves Facial (VII)] : face symmetrical [Cranial Nerves Vestibulocochlear (VIII)] : hearing was intact bilaterally [Involuntary Movements] : no involuntary movements were seen [No Muscle Atrophy] : normal bulk in all four extremities [Abnormal Walk] : normal gait [Balance] : balance was intact [Short Term Intact] : short term memory impaired [MocaTotal] : 20

## 2024-09-27 LAB — NMDA RECEPTOR AB N-METHYL-D-ASPARTATE RECEPTOR AB IGG, SERUM WITH REFLEX TO TITER: NORMAL

## 2024-10-07 DIAGNOSIS — F32.A DEPRESSION, UNSPECIFIED: ICD-10-CM

## 2024-10-07 DIAGNOSIS — E03.9 HYPOTHYROIDISM, UNSPECIFIED: ICD-10-CM

## 2024-10-07 DIAGNOSIS — R41.3 OTHER AMNESIA: ICD-10-CM

## 2024-10-07 DIAGNOSIS — E10.39 TYPE 1 DIABETES MELLITUS WITH OTHER DIABETIC OPHTHALMIC COMPLICATION: ICD-10-CM

## 2024-10-15 ENCOUNTER — APPOINTMENT (OUTPATIENT)
Dept: GASTROENTEROLOGY | Facility: HOSPITAL | Age: 49
End: 2024-10-15
Payer: COMMERCIAL

## 2024-10-15 ENCOUNTER — OUTPATIENT (OUTPATIENT)
Dept: OUTPATIENT SERVICES | Facility: HOSPITAL | Age: 49
LOS: 1 days | End: 2024-10-15
Payer: SELF-PAY

## 2024-10-15 VITALS
HEART RATE: 74 BPM | BODY MASS INDEX: 30.66 KG/M2 | TEMPERATURE: 98.1 F | WEIGHT: 184 LBS | DIASTOLIC BLOOD PRESSURE: 73 MMHG | HEIGHT: 65 IN | SYSTOLIC BLOOD PRESSURE: 118 MMHG | OXYGEN SATURATION: 96 % | RESPIRATION RATE: 18 BRPM

## 2024-10-15 DIAGNOSIS — R63.4 ABNORMAL WEIGHT LOSS: ICD-10-CM

## 2024-10-15 DIAGNOSIS — R93.89 ABNORMAL FINDINGS ON DIAGNOSTIC IMAGING OF OTHER SPECIFIED BODY STRUCTURES: ICD-10-CM

## 2024-10-15 DIAGNOSIS — R10.9 UNSPECIFIED ABDOMINAL PAIN: ICD-10-CM

## 2024-10-15 PROCEDURE — G2211 COMPLEX E/M VISIT ADD ON: CPT

## 2024-10-15 PROCEDURE — G0463: CPT

## 2024-10-15 PROCEDURE — 99213 OFFICE O/P EST LOW 20 MIN: CPT

## 2024-10-23 ENCOUNTER — APPOINTMENT (OUTPATIENT)
Dept: CT IMAGING | Facility: IMAGING CENTER | Age: 49
End: 2024-10-23

## 2024-10-23 ENCOUNTER — OUTPATIENT (OUTPATIENT)
Dept: OUTPATIENT SERVICES | Facility: HOSPITAL | Age: 49
LOS: 1 days | End: 2024-10-23
Payer: SELF-PAY

## 2024-10-23 DIAGNOSIS — R93.89 ABNORMAL FINDINGS ON DIAGNOSTIC IMAGING OF OTHER SPECIFIED BODY STRUCTURES: ICD-10-CM

## 2024-10-23 PROCEDURE — 71250 CT THORAX DX C-: CPT

## 2024-10-23 PROCEDURE — 71250 CT THORAX DX C-: CPT | Mod: 26

## 2024-10-24 ENCOUNTER — OUTPATIENT (OUTPATIENT)
Dept: OUTPATIENT SERVICES | Facility: HOSPITAL | Age: 49
LOS: 1 days | End: 2024-10-24

## 2024-10-24 ENCOUNTER — APPOINTMENT (OUTPATIENT)
Dept: CT IMAGING | Facility: IMAGING CENTER | Age: 49
End: 2024-10-24

## 2024-10-24 DIAGNOSIS — R93.89 ABNORMAL FINDINGS ON DIAGNOSTIC IMAGING OF OTHER SPECIFIED BODY STRUCTURES: ICD-10-CM

## 2024-11-05 ENCOUNTER — NON-APPOINTMENT (OUTPATIENT)
Age: 49
End: 2024-11-05

## 2024-11-21 ENCOUNTER — APPOINTMENT (OUTPATIENT)
Dept: ENDOCRINOLOGY | Facility: HOSPITAL | Age: 49
End: 2024-11-21

## 2024-12-03 ENCOUNTER — APPOINTMENT (OUTPATIENT)
Dept: GASTROENTEROLOGY | Facility: HOSPITAL | Age: 49
End: 2024-12-03

## 2024-12-19 ENCOUNTER — APPOINTMENT (OUTPATIENT)
Dept: NEUROLOGY | Facility: HOSPITAL | Age: 49
End: 2024-12-19

## 2025-02-05 ENCOUNTER — APPOINTMENT (OUTPATIENT)
Dept: OPHTHALMOLOGY | Facility: CLINIC | Age: 50
End: 2025-02-05

## 2025-04-08 NOTE — REVIEW OF SYSTEMS
The patient's goals for the shift include      The clinical goals for the shift include patient will tolerate iv antibiotics throuhgout shift    Patient tolerated iv antibiotics well throughout shift. Denies pain. Call light within reach.   Patient/Caregiver provided printed discharge information. [see HPI] : see HPI [Negative] : Integumentary

## (undated) DEVICE — PACK IV START WITH CHG

## (undated) DEVICE — ELCTR GROUNDING PAD ADULT COVIDIEN

## (undated) DEVICE — BIOPSY FORCEP RADIAL JAW 4 STANDARD WITH NEEDLE

## (undated) DEVICE — POLY TRAP ETRAP

## (undated) DEVICE — TUBING SUCTION 20FT

## (undated) DEVICE — CATH IV SAFE BC 20G X 1.16" (PINK)

## (undated) DEVICE — BITE BLOCK ADULT 20 X 27MM (GREEN)

## (undated) DEVICE — SYR ALLIANCE II INFLATION 60ML

## (undated) DEVICE — TUBING SUCTION CONN 6FT STERILE

## (undated) DEVICE — SENSOR O2 FINGER ADULT

## (undated) DEVICE — CATH IV SAFE BC 22G X 1" (BLUE)

## (undated) DEVICE — CLAMP BX HOT RAD JAW 3

## (undated) DEVICE — SUCTION YANKAUER NO CONTROL VENT

## (undated) DEVICE — BALLOON US ENDO

## (undated) DEVICE — SYR LUER LOK 50CC

## (undated) DEVICE — FORCEP RADIAL JAW 4 JUMBO 2.8MM 3.2MM 240CM ORANGE DISP

## (undated) DEVICE — BRUSH COLONOSCOPY CYTOLOGY

## (undated) DEVICE — FOLEY HOLDER STATLOCK 2 WAY ADULT

## (undated) DEVICE — SOL INJ NS 0.9% 500ML 2 PORT

## (undated) DEVICE — ENDOCUFF VISION SZ 3 SM PRPL

## (undated) DEVICE — IRRIGATOR BIO SHIELD

## (undated) DEVICE — TUBING IV SET GRAVITY 3Y 100" MACRO